# Patient Record
Sex: MALE | Race: WHITE | NOT HISPANIC OR LATINO | URBAN - METROPOLITAN AREA
[De-identification: names, ages, dates, MRNs, and addresses within clinical notes are randomized per-mention and may not be internally consistent; named-entity substitution may affect disease eponyms.]

---

## 2017-07-21 ENCOUNTER — OFFICE VISIT (OUTPATIENT)
Dept: FAMILY MEDICINE | Facility: CLINIC | Age: 27
End: 2017-07-21

## 2017-07-21 VITALS
OXYGEN SATURATION: 95 % | SYSTOLIC BLOOD PRESSURE: 116 MMHG | RESPIRATION RATE: 20 BRPM | HEART RATE: 74 BPM | WEIGHT: 125 LBS | BODY MASS INDEX: 16.04 KG/M2 | DIASTOLIC BLOOD PRESSURE: 80 MMHG | TEMPERATURE: 98.1 F | HEIGHT: 74 IN

## 2017-07-21 DIAGNOSIS — F64.0 GENDER DYSPHORIA IN ADOLESCENT AND ADULT: Primary | ICD-10-CM

## 2017-07-21 NOTE — PATIENT INSTRUCTIONS
Here is the plan from today's visit    1. Gender dysphoria in adolescent and adult    - Testosterone Total; Future  - Comprehensive Metabolic Panel; Future  - Hemoglobin (HGB); Future  - Lipid Cascade; Future      Please call or return to clinic if your symptoms don't go away.    Follow up plan  Please make a clinic appointment for follow up with me (BARB SIMON) in 1 month.  Follow up for lab appointment within next 3 weeks for blood draw (nothing to eat or drink for 10 hours prior to blood work).  Sips of water ok if needed.    Thank you for coming to [facility] today.  Lab Testing:  **If you had lab testing today and your results are reassuring or normal they will be mailed to you or sent through [employer] within 7 days.   **If the lab tests need quick action we will call you with the results.  The phone number we will call with results is # [phone] (home) . If this is not the best number please call our clinic and change the number.  Medication Refills:  If you need any refills please call your pharmacy and they will contact us.   If you need to  your refill at a new pharmacy, please contact the new pharmacy directly. The new pharmacy will help you get your medications transferred faster.   Scheduling:  If you have any concerns about today's visit or wish to schedule another appointment please call our office during normal business hours [phone] (8-5:00 M-F)  If a referral was made to a [facility] and you don't get a call from central scheduling please call [phone].  If a Mammogram was ordered for you at [facility] call [phone] to schedule or change your appointment.  If you had an XRay/CT/Ultrasound/MRI ordered the number is [phone] to schedule or change your radiology appointment.   Medical Concerns:  If you have urgent medical concerns please call [phone] at any time of the day.              Informed Consent   Feminizing  "Medications      This form refers to the use of estrogen and/or androgen antagonists (sometimes called \"anti-androgens\" or \"androgen blockers\") by persons in the male-to-female spectrum who wish to become feminized to reduce gender dysphoria and facilitate a more feminine gender presentation. While there are risks associated with taking feminizing medications, when appropriately prescribed they can greatly improve mental health and quality of life.     Please seek another opinion if you want additional perspective on any aspect of your care.       Feminizing Effects     1. I understand that estrogen, androgen antagonists, or a combination of the two may be  prescribed to reduce male physical features and feminize my body.     2. I understand that the feminizing effects of estrogen and androgen antagonists can take several  months to a year to become noticeable, speed and degree of change is unpredictable.     3.  I understand that if I am taking estrogen I will probably develop breasts, and:        ? Breasts may take several years to develop to their full size.   ? Even if estrogen is stopped, the breast tissue that has developed will remain.   ? As soon as breasts start growing, it is recommended to have an annual breast exam.  ? There may be milky nipple discharge (galactorrhea). If you develop this, it is advised to check with a doctor to determine the cause.   ? It is not known if taking estrogen increases the risk of breast cancer.     4. I understand that the following changes are generally not permanent (that is, they will likely reverse if I stop taking feminizing medications):     ? Skin may become softer.   ? Muscle mass decreases and there may be a decrease in upper body strength.   ? Body hair growth may become less noticeable and grow more slowly,but won't stop.  ? Male pattern baldness may slow down, but will probably not stop completely, and hair that has already been lost will likely not grow back. " "  ? Fat may redistribute to a more feminine pattern (decreased in abdomen, increased on buttocks/hips/thighs - changing from \"apple shape\" to \"pear shape\").       5. I understand that taking feminizing medications will make my testicles produce less testosterone, which can affect my overall sexual function:    ? Fertility may be impaired, and I should consider sperm banking if I desire biological children.  ? Sperm may not mature, leading to reduced fertility. It is still possible to get someone pregnant however and contraception should be used if needed.  ? Testicles may shrink by 25-50%. Testicular examinations are still recommended.  ? The amount of fluid ejaculated may be reduced.   ? There is typically decrease in morning and spontaneous erections.   ? Erections may not be firm enough for penetrative sex.   ? Libido (sex drive) may decrease.     6. I understand that there are some aspects of my body that are not significantly changed by feminizing medications, though there may be other treatments that can be helpful.    ? Blanton/facial hair may grow more slowly and be less noticeable, but will not go away.   ? Voice pitch will not rise and speech patterns will not become more feminine.   ? The laryngeal prominence (\"Adalberto's apple\") will not shrink.      Risks of Feminizing Medications     7. I understand that the medical effects and safety of feminizing medications are not fully understood, and that there may be long-term risks that are not yet known.     8. I understand that I am strongly advised not to take more medication than I am prescribed, as this increases health risks. It won't help changes come faster.     9. I understand that feminizing medications can damage the liver. I have been advised that I should be monitored for possible liver damage as long as I am taking feminizing medications.     10. I understand that feminizing medications will result in changes that will be noticeable by other people, and " that some people in similar circumstances have experienced harassment, discrimination, and violence, while others have lost support of loved ones. I have been advised that referrals can be made for support/counselling if this would be helpful.     Medical Risks Associated with Estrogen     11. I understand that taking estrogen increases the risk of blood clots, which can result in:     ? Pulmonary embolism (blood clot to the lungs), which may cause death.  Stroke, which may cause permanent brain damage or death   ? Heart attack   ? Chronic leg vein problems     I understand that the risk of blood clots is much worse if I smoke cigarettes, especially over age 40. I understand that the danger is so high that I should stop smoking completely if I start taking estrogen. I can ask my doctor for advice on quitting.    12. I understand that taking estrogen can increase deposits of fat around my internal organs, which is associated with increased risk for diabetes and heart disease.     13. I understand that taking estrogen can cause increased blood pressure,  estrogen increases the risk of gallstones,  estrogen can cause headaches or migraines and can cause nausea and vomiting. I have been advised that if I develop these, it is recommended that I discuss this with my doctor.     14. I understand that it is not known if taking estrogen increases the risk of non-cancerous tumors of the pituitary gland. I have been informed that although this is typically not life-threatening, it can damage vision. I understand that this will be monitored.    15. I have been informed that I am more likely to have dangerous side effects from estrogen if I smoke, am overweight, am over 40 years old, or have a history of blood clots, high blood pressure, or a family history of breast cancer.     16. I have been informed that if I take too much estrogen, my body may convert it into testosterone, which may slow or stop feminization.     Medical  Risks Associated with Androgen Antagonists     17. I have been informed that spironolactone affects the balance of water and salts in the kidneys, and that this may:     ? Increase the amount of urine produced, and I may urinate more frequently   ? Reduce blood pressure   ? Rarely, cause high levels of potassium in the blood, and this will be monitored    18. I understand that some androgen antagonists make it more difficult to evaluate the results of PSA test. If I am over 50, I should have my prostate evaluated every year.     Prevention of Medical Complications     19. I agree to take feminizing medications as prescribed and to tell my care provider if I am not happy with the treatment or am experiencing any problems.     20. I understand that the right dose or type of medication prescribed for me may not be the same as for someone else.     21. I understand that physical examinations and blood tests are needed on a regular basis (monthly, at first) to check for negative side effects of feminizing medications.     22. I understand that feminization medications can interact with other medication (including other sources of hormones), dietary supplements, herbs, alcohol, and street drugs. I understand that being honest with my care provider about what else I am taking will help prevent medical complications that could be life-threatening. I have been informed that I will continue to get medical care no matter what information I share.     23. I understand that some medical conditions make it dangerous to take estrogen or androgen antagonists. I agree to be checked for risky conditions before the decision to start or continue feminizing medication is made.     24. I understand that I can choose to stop taking feminizing medication at any time, and that it is advised that I do this with the help of my doctor to make sure there are no negative reactions to stopping. I understand that my doctor may suggest I reduce,  switch or stop taking feminizing medication, if there are severe health risks that can't be controlled.    My signature below confirms that:     ? My doctor has talked with me about the benefits and risks of feminizing medication, the possible or likely consequences of hormone therapy, and potential treatment options.   ? I understand the risks that may be involved.   ? I understand that this form covers known effects and risks and that there may be long-term effects or risks that are not yet known.   ? I have had sufficient opportunity to discuss treatment options with my doctor. All of my questions have been answered to my satisfaction.   ? I believe I have adequate knowledge on which to base informed consent to the provision of feminizing medication.     Based on this:     _____ I wish to begin taking estrogen.     _____ I wish to begin taking androgen antagonists (e.g., Spironolactone).     _____ I do not wish to begin taking feminizing medication at this time.     Whatever your current decision is, please talk with your doctor any time you have questions, concerns, or want to re-evaluate your options.         ____________________________________ __________________   Patient Signature     Date         ____________________________________ __________________   Prescribing Clinician Signature    Date    Some online resources for transgender health    [place] [facility]gender [facility]   Home to the [facility] at [address] [place] [address], [phone]. Also has support groups.  http://www.[place]transhealth.org/    [facility] Excellence for Transgender Health  Increasing access to comprehensive, effective, and affirming healthcare services for trans and gender-variant communities.  http://www.transhealth.[facility].edu/trans?page=ajith-00-05    [facility]   Community-based non-profit committed to advancing the health & wellness of LGBTQ communities through research, education and advocacy.  http://www.[facility].org    [facility] Glossary of Transgender Terms  http://www.[facility].org/site/DocServer/Handout_7-C_Glossary_of_Gender_and_Transgender_Terms__fi.pdf?ydxOT=4562    Safe, gender-neutral public restrooms in the [facility]   http://www.mntranshealth.org//index.php?option=com_content&task=view&id=12&Itemid    Trans Youth Support Network  For people 26 and under who identify as a trans or gender non-conforming person and want to be a part of an activist organization. Offers peer support, education and community building opportunities.   http://www.transyouthsupportnetwork.org/    Reclaim:  RECLAIM offers mental and integrative health services for LGBTQ youth and their families.  http://www.reclaim-lgbtyouth.org/    Gender Spectrum, for Trans Youth  Gender Spectrum provides education, training and support to help create a gender sensitive and inclusive environment for all children and teens. http://www.genderspectrum.org/    [facility] FTM Resource Guide  Information on topics of interest to female-to-male (FTM, F2M) trans men, and their friends and loved ones.  http://ftmguide.org/    Also check out your local [employer] queer resource center!  LGBTQIA Services at [facility]: http://www.[facility]/lgbtqia/  LGBTQ@[facility] at [facility]: http://www.[facility]/multiculturallife/lgbtq/  GLBTA Programs office at [facility] : https://diversity.[facility].edu/glbta/            Thoughts of self harm?   Trans Lifeline can be reached at [phone].   This service is staffed by trans people 24/7.   For LGBT youth (ages 24 and younger) contemplating suicide, the [employer] Lifeline can be reached at [phone].       FEMINIZING Medications, Labs and f/u for Transwomen (MTF)Updated 6/2016  Drug Dose s/p Orchiectomy Typical patient Miscellaneous Details   Estradiol po  (Estrace) Start: 2mg pills daily at same time  Typical: 4-6mg     Max: 8mg Typical 1-2mg daily <39 yo, nonsmoker  good w/ daily meds Erectile dysfunction  that is bothersome ? typical ED treatments.    Estradiol patch (Vivelle-Dot) apply 2x per wk  Start:  mcg/24 hr patch,   Typical: 100-200mcg daily  Max: 2 patches 2x/wk (200mcg/d) Typical: 37.5- 100mcg daily, changed 2x/wk Hx VTE, >40, smoker, transaminitis Patch falling off? Clean w/ EtOH first, apply & press x30sec. Baby oil to remove adhesive. Rx for 8 -16 patches/month.   Estradiol valerate (Delestrogen) Start: 5 mg IM weekly  Typical: 5-10 mg IM weekly  Max: 15-20 mg weekly 5mg IM weekly or 10 IM q2wk Ok w/ needles, >40, already on it Needles: 23-25gauge 1inch (if thigh) to 1.5inch (if glut), 1cc syringe, 18ga to draw up. Comes in 5ml bottles.   Estradiol cream  compounded Start: 50-100mcg to thighs daily  Typical: 100-200mcg   Max: 200 37.5-100mcg daily Ok w/ topical, paying out of pocket [facility]: [facility] [address], [facility] [address] online, in PLO gel   Conj. Estrogens (premarin)  Typical: 5mg daily. Avoid if possible due to higher risk VTE Get IMAN & ensure informed of risk Consult local expert before long term continuation.    Spironolactone Start: 50-100mg po daily  Max: 200 (MOST)-300mg  DC Bothersome hair, acne or erections AntiAndrogen. Check bmp when on max dose. Split dose if dizzy.   Finasteride Start: 2.5mg po daily  Max: 5-10mg daily DC unless for alopecia Bothersome hair, alopecia, or erections AntiAndrogen - ok to use in combo w/ casper if poor feminizing effect   ?Progesterone? RARELY USE 200mg prometrium HS or q3mo depo shots IF ASKED ONLY DC Wants breast dev, no depression/obesity Causes wt gain and a lot of depression. Data inconclusive on breast developmt.     LABS Hgb Fasting Glu Fasting Lipid Total testosterone LFT s Prolactin Estrogen Ultrasens.   On shots? Draw labs  Midcycle! ( - way btw shots) -Baseline  -3 mo after   -6mo after  -then annual  After=after start or dose change Or A1c  -Baseline  -3 mo after   -6mo after  -then annual -Baseline  -3 mo after   -6mo  after  -then annual -Baseline  -3 mo after   -6mo after  -q1yr.   Goal level <50 -Baseline  -3 mo after   -6mo after  -q1yr. Abnl?RUQ US, hep serology -if Sx only, rare.  If >100 needs f/u, MRI Check ONLY if on injectable or not clinically feminizing. Level should be 100-200.  >200 ?VTE risk and dose must be ?immediately, f/u in 1 month   On casper -Baseline BUN/Cr/lytes, Again when MAX dose  -annually thereafter, careful with ACEi s Follow up lab abnormalities w/repeat labs  UDon t panic ?may need small adjustment in dose.      -PPsychotherapy: not required! REC if: no consolidated gender, not gender dysphoric by criteria, nonconforming & may not need hormones  -E-Estrogen therapy is CONTRAINDICATED IF estrogen dependent cancer  -G-Gender related effects: Breast development, redistribution of body fat, softening of skin, ?mood, testes shrink, ?libido and ? fertility  -O-Other effects: ?VTE risk (steven if >40, smoker, obese, hx clots, po meds), ?Weight, emotional lability, ?lipids, ?erections, ?gallstones  **Upcoming surgery? HOLD ESTROGEN for 2-4 weeks prior to surgical procedure Adapted by Nasra Johns MD from:  Dept Public Health, [facility] Trans* Center of Excellence, [facility] Guidelines, [facility]PATH SOC-7.  Updated 6/2016  Trans Guidelines [facility]       -    -0Psychotherapy: not required! REC if: no consolidated gender, not gender dysphoric by criteria, nonconforming & may not need hormones      -Testosterone therapy is CONTRAINDICATED IF unstable psychosis, bipolar, or PREGNANCY. Need excellent contraception if sperm!  -   Gender related effects: stop menses, ?voice, ?facial/body hair, ?muscle mass, redistrib body fat, acne, ?libido, ?fertility, ?clitoris  -   Other effects: ?Weight, emotional lability, ?lipids, vaginal atrophy, ? BP, ?RBC, ?LFT s transiently, ?LAMBERT, ?endometrial hyperplasia steven  since higher incidence of PCOS in transmen    -surveillance of periods is critical for long term T  -    Testosterone is a controlled substance. Educate about this. Follow for reasonable refills. Paper rx required.    Adapted by Nasra Johns MD from:  Dept Public Health, [facility] Trans* Center of Excellence, [facility] Guidelines, WPATH SOC-7. Updated 6/2016 Trans Guidelines [facility]     Effects and Expected Time Course of Feminizing Hormones    Effect Expected Onset Expected Maximum Effect   Body Fat redistribution 3-6 months 2-5 years   Decreased Muscle mass 3-6 months 1-2 years   Softening of skin/decreased oiliness 3-6 months Unknown   Decreased Libido 1-3 months 1-2 years   Decreased Spontaneous Erections 1-3 months 3-6 months   Male Sexual Dysfunction Variable Variable   Breast Growth 3-6 months 2-3 years   Decreased Testicular volume 3-6 months 2-3 years   Decreased sperm production Variable Variable   Thinning and slowed growth of body and facial hair  + 6-12 months >3 years   Male pattern baldness No regrowth, loss stops 1-3 months 1-2 years   +complete removal of male facial hair and body hair requires electrolysis, laser treatment or both

## 2017-07-21 NOTE — MR AVS SNAPSHOT
After Visit Summary   7/21/2017    Carlos Yu    MRN: [id number]           Patient Information     Date Of Birth          1990        Visit Information        Provider Department      7/21/2017 1:20 PM Won Simon MD [facility]        Today's Diagnoses     Gender dysphoria in adolescent and adult    -  1      Care Instructions    Here is the plan from today's visit    1. Gender dysphoria in adolescent and adult    - Testosterone Total; Future  - Comprehensive Metabolic Panel; Future  - Hemoglobin (HGB); Future  - Lipid Cascade; Future      Please call or return to clinic if your symptoms don't go away.    Follow up plan  Please make a clinic appointment for follow up with me (WON SIMON) in 1 month.  Follow up for lab appointment within next 3 weeks for blood draw (nothing to eat or drink for 10 hours prior to blood work).  Sips of water ok if needed.    Thank you for coming to [facility] today.  Lab Testing:  **If you had lab testing today and your results are reassuring or normal they will be mailed to you or sent through [employer] within 7 days.   **If the lab tests need quick action we will call you with the results.  The phone number we will call with results is # [phone] (home) . If this is not the best number please call our clinic and change the number.  Medication Refills:  If you need any refills please call your pharmacy and they will contact us.   If you need to  your refill at a new pharmacy, please contact the new pharmacy directly. The new pharmacy will help you get your medications transferred faster.   Scheduling:  If you have any concerns about today's visit or wish to schedule another appointment please call our office during normal business hours [phone] (8-5:00 M-F)  If a referral was made to a [facility] and you don't get a call from central scheduling please call [phone].  If  "a Mammogram was ordered for you at [facility] call [phone] to schedule or change your appointment.  If you had an XRay/CT/Ultrasound/MRI ordered the number is [phone] to schedule or change your radiology appointment.   Medical Concerns:  If you have urgent medical concerns please call [phone] at any time of the day.              Informed Consent   Feminizing Medications      This form refers to the use of estrogen and/or androgen antagonists (sometimes called \"anti-androgens\" or \"androgen blockers\") by persons in the male-to-female spectrum who wish to become feminized to reduce gender dysphoria and facilitate a more feminine gender presentation. While there are risks associated with taking feminizing medications, when appropriately prescribed they can greatly improve mental health and quality of life.     Please seek another opinion if you want additional perspective on any aspect of your care.       Feminizing Effects     1. I understand that estrogen, androgen antagonists, or a combination of the two may be  prescribed to reduce male physical features and feminize my body.     2. I understand that the feminizing effects of estrogen and androgen antagonists can take several  months to a year to become noticeable, speed and degree of change is unpredictable.     3.  I understand that if I am taking estrogen I will probably develop breasts, and:        ? Breasts may take several years to develop to their full size.   ? Even if estrogen is stopped, the breast tissue that has developed will remain.   ? As soon as breasts start growing, it is recommended to have an annual breast exam.  ? There may be milky nipple discharge (galactorrhea). If you develop this, it is advised to check with a doctor to determine the cause.   ? It is not known if taking estrogen increases the risk of breast cancer.     4. I understand that the following changes are generally not permanent (that is, they will likely " "reverse if I stop taking feminizing medications):     ? Skin may become softer.   ? Muscle mass decreases and there may be a decrease in upper body strength.   ? Body hair growth may become less noticeable and grow more slowly,but won't stop.  ? Male pattern baldness may slow down, but will probably not stop completely, and hair that has already been lost will likely not grow back.   ? Fat may redistribute to a more feminine pattern (decreased in abdomen, increased on buttocks/hips/thighs - changing from \"apple shape\" to \"pear shape\").       5. I understand that taking feminizing medications will make my testicles produce less testosterone, which can affect my overall sexual function:    ? Fertility may be impaired, and I should consider sperm banking if I desire biological children.  ? Sperm may not mature, leading to reduced fertility. It is still possible to get someone pregnant however and contraception should be used if needed.  ? Testicles may shrink by 25-50%. Testicular examinations are still recommended.  ? The amount of fluid ejaculated may be reduced.   ? There is typically decrease in morning and spontaneous erections.   ? Erections may not be firm enough for penetrative sex.   ? Libido (sex drive) may decrease.     6. I understand that there are some aspects of my body that are not significantly changed by feminizing medications, though there may be other treatments that can be helpful.    ? Blanton/facial hair may grow more slowly and be less noticeable, but will not go away.   ? Voice pitch will not rise and speech patterns will not become more feminine.   ? The laryngeal prominence (\"Adalberto's apple\") will not shrink.      Risks of Feminizing Medications     7. I understand that the medical effects and safety of feminizing medications are not fully understood, and that there may be long-term risks that are not yet known.     8. I understand that I am strongly advised not to take more medication than I am " prescribed, as this increases health risks. It won't help changes come faster.     9. I understand that feminizing medications can damage the liver. I have been advised that I should be monitored for possible liver damage as long as I am taking feminizing medications.     10. I understand that feminizing medications will result in changes that will be noticeable by other people, and that some people in similar circumstances have experienced harassment, discrimination, and violence, while others have lost support of loved ones. I have been advised that referrals can be made for support/counselling if this would be helpful.     Medical Risks Associated with Estrogen     11. I understand that taking estrogen increases the risk of blood clots, which can result in:     ? Pulmonary embolism (blood clot to the lungs), which may cause death.  Stroke, which may cause permanent brain damage or death   ? Heart attack   ? Chronic leg vein problems     I understand that the risk of blood clots is much worse if I smoke cigarettes, especially over age 40. I understand that the danger is so high that I should stop smoking completely if I start taking estrogen. I can ask my doctor for advice on quitting.    12. I understand that taking estrogen can increase deposits of fat around my internal organs, which is associated with increased risk for diabetes and heart disease.     13. I understand that taking estrogen can cause increased blood pressure,  estrogen increases the risk of gallstones,  estrogen can cause headaches or migraines and can cause nausea and vomiting. I have been advised that if I develop these, it is recommended that I discuss this with my doctor.     14. I understand that it is not known if taking estrogen increases the risk of non-cancerous tumors of the pituitary gland. I have been informed that although this is typically not life-threatening, it can damage vision. I understand that this will be monitored.    15.  I have been informed that I am more likely to have dangerous side effects from estrogen if I smoke, am overweight, am over 40 years old, or have a history of blood clots, high blood pressure, or a family history of breast cancer.     16. I have been informed that if I take too much estrogen, my body may convert it into testosterone, which may slow or stop feminization.     Medical Risks Associated with Androgen Antagonists     17. I have been informed that spironolactone affects the balance of water and salts in the kidneys, and that this may:     ? Increase the amount of urine produced, and I may urinate more frequently   ? Reduce blood pressure   ? Rarely, cause high levels of potassium in the blood, and this will be monitored    18. I understand that some androgen antagonists make it more difficult to evaluate the results of PSA test. If I am over 50, I should have my prostate evaluated every year.     Prevention of Medical Complications     19. I agree to take feminizing medications as prescribed and to tell my care provider if I am not happy with the treatment or am experiencing any problems.     20. I understand that the right dose or type of medication prescribed for me may not be the same as for someone else.     21. I understand that physical examinations and blood tests are needed on a regular basis (monthly, at first) to check for negative side effects of feminizing medications.     22. I understand that feminization medications can interact with other medication (including other sources of hormones), dietary supplements, herbs, alcohol, and street drugs. I understand that being honest with my care provider about what else I am taking will help prevent medical complications that could be life-threatening. I have been informed that I will continue to get medical care no matter what information I share.     23. I understand that some medical conditions make it dangerous to take estrogen or androgen  antagonists. I agree to be checked for risky conditions before the decision to start or continue feminizing medication is made.     24. I understand that I can choose to stop taking feminizing medication at any time, and that it is advised that I do this with the help of my doctor to make sure there are no negative reactions to stopping. I understand that my doctor may suggest I reduce, switch or stop taking feminizing medication, if there are severe health risks that can't be controlled.    My signature below confirms that:     ? My doctor has talked with me about the benefits and risks of feminizing medication, the possible or likely consequences of hormone therapy, and potential treatment options.   ? I understand the risks that may be involved.   ? I understand that this form covers known effects and risks and that there may be long-term effects or risks that are not yet known.   ? I have had sufficient opportunity to discuss treatment options with my doctor. All of my questions have been answered to my satisfaction.   ? I believe I have adequate knowledge on which to base informed consent to the provision of feminizing medication.     Based on this:     _____ I wish to begin taking estrogen.     _____ I wish to begin taking androgen antagonists (e.g., Spironolactone).     _____ I do not wish to begin taking feminizing medication at this time.     Whatever your current decision is, please talk with your doctor any time you have questions, concerns, or want to re-evaluate your options.         ____________________________________ __________________   Patient Signature     Date         ____________________________________ __________________   Prescribing Clinician Signature    Date    Some online resources for transgender health    [place] Transgender Health Coalition   Home to the [facility] at [address] [place] [address], [phone]. Also has support groups.  http://www.[place]transhealth.org/    Center of  Excellence for Transgender Health  Increasing access to comprehensive, effective, and affirming healthcare services for trans and gender-variant communities.  http://www.transhealth.[facility].edu/trans?page=ajith-00-05    [facility]   Community-based non-profit committed to advancing the health & wellness of LGBTQ communities through research, education and advocacy. http://www.[employer].org    [facility] Glossary of Transgender Terms  http://www.[facility].org/site/DocServer/Handout_7-C_Glossary_of_Gender_and_Transgender_Terms__fi.pdf?nmiUB=9852    Safe, gender-neutral public restrooms in [facility]   http://www.[place]transhealth.org//index.php?option=com_content&task=view&id=12&Itemid    Trans Youth Support Network  For people 26 and under who identify as a trans or gender non-conforming person and want to be a part of an activist organization. Offers peer support, education and community building opportunities.   http://www.transyouthsupportnetwork.org/    Reclaim:  RECLAIM offers mental and integrative health services for LGBTQ youth and their families.  http://www.reclaim-lgbtyouth.org/    Gender Spectrum, for Trans Youth  Gender Spectrum provides education, training and support to help create a gender sensitive and inclusive environment for all children and teens. http://www.genderspectrum.org/    [place] s FTM Resource Guide  Information on topics of interest to female-to-male (FTM, F2M) trans men, and their friends and loved ones.  http://ftmguide.org/    Also check out your local [employer] queer resource center!  LGBTQIA Services at [facility]: http://www.[facility]/lgbtqia/  LGBTQ@[facility] at [facility]: http://www.[facility].edu/multiculturallife/lgbtq/  GLBTA Programs office at  of : https://diversity.[facility].edu/glbta/            Thoughts of self harm?   Trans Lifeline can be reached at [phone].   This service is staffed by trans people 24/7.   For LGBT youth (ages 24 and younger) contemplating  suicide, the [employer] Lifeline can be reached at [phone].       FEMINIZING Medications, Labs and f/u for Transwomen (MTF)Updated 6/2016  Drug Dose s/p Orchiectomy Typical patient Miscellaneous Details   Estradiol po  (Estrace) Start: 2mg pills daily at same time  Typical: 4-6mg     Max: 8mg Typical 1-2mg daily <41 yo, nonsmoker  good w/ daily meds Erectile dysfunction that is bothersome ? typical ED treatments.    Estradiol patch (Vivelle-Dot) apply 2x per wk  Start:  mcg/24 hr patch,   Typical: 100-200mcg daily  Max: 2 patches 2x/wk (200mcg/d) Typical: 37.5- 100mcg daily, changed 2x/wk Hx VTE, >40, smoker, transaminitis Patch falling off? Clean w/ EtOH first, apply & press x30sec. Baby oil to remove adhesive. Rx for 8 -16 patches/month.   Estradiol valerate (Delestrogen) Start: 5 mg IM weekly  Typical: 5-10 mg IM weekly  Max: 15-20 mg weekly 5mg IM weekly or 10 IM q2wk Ok w/ needles, >40, already on it Needles: 23-25gauge 1inch (if thigh) to 1.5inch (if glut), 1cc syringe, 18ga to draw up. Comes in 5ml bottles.   Estradiol cream  compounded Start: 50-100mcg to thighs daily  Typical: 100-200mcg   Max: 200 37.5-100mcg daily Ok w/ topical, paying out of pocket Pharm: [facility] [address] online, in PLO gel   Conj. Estrogens (premarin)  Typical: 5mg daily. Avoid if possible due to higher risk VTE Get IMAN & ensure informed of risk Consult local expert before long term continuation.    Spironolactone Start: 50-100mg po daily  Max: 200 (MOST)-300mg  DC Bothersome hair, acne or erections AntiAndrogen. Check bmp when on max dose. Split dose if dizzy.   Finasteride Start: 2.5mg po daily  Max: 5-10mg daily DC unless for alopecia Bothersome hair, alopecia, or erections AntiAndrogen - ok to use in combo w/ casper if poor feminizing effect   ?Progesterone? RARELY USE 200mg prometrium HS or q3mo depo shots IF ASKED ONLY DC Wants breast dev, no depression/obesity Causes wt gain and a lot  of depression. Data inconclusive on breast developmt.     LABS Hgb Fasting Glu Fasting Lipid Total testosterone LFT s Prolactin Estrogen Ultrasens.   On shots? Draw labs  Midcycle! ( - way btw shots) -Baseline  -3 mo after   -6mo after  -then annual  After=after start or dose change Or A1c  -Baseline  -3 mo after   -6mo after  -then annual -Baseline  -3 mo after   -6mo after  -then annual -Baseline  -3 mo after   -6mo after  -q1yr.   Goal level <50 -Baseline  -3 mo after   -6mo after  -q1yr. Abnl?RUQ US, hep serology -if Sx only, rare.  If >100 needs f/u, MRI Check ONLY if on injectable or not clinically feminizing. Level should be 100-200.  >200 ?VTE risk and dose must be ?immediately, f/u in 1 month   On casper -Baseline BUN/Cr/lytes, Again when MAX dose  -annually thereafter, careful with ACEi s Follow up lab abnormalities w/repeat labs  UDon t panic ?may need small adjustment in dose.      -PPsychotherapy: not required! REC if: no consolidated gender, not gender dysphoric by criteria, nonconforming & may not need hormones  -E-Estrogen therapy is CONTRAINDICATED IF estrogen dependent cancer  -G-Gender related effects: Breast development, redistribution of body fat, softening of skin, ?mood, testes shrink, ?libido and ? fertility  -O-Other effects: ?VTE risk (steven if >40, smoker, obese, hx clots, po meds), ?Weight, emotional lability, ?lipids, ?erections, ?gallstones  **Upcoming surgery? HOLD ESTROGEN for 2-4 weeks prior to surgical procedure Adapted by Nasra Johns MD from: [place] Dept Public Health, [facility] Trans* Center of Excellence, [facility] Guidelines, WPATH SOC-7.  Updated 6/2016  Trans Guidelines [facility]       -    -0Psychotherapy: not required! REC if: no consolidated gender, not gender dysphoric by criteria, nonconforming & may not need hormones      -Testosterone therapy is CONTRAINDICATED IF unstable psychosis, bipolar, or PREGNANCY. Need excellent contraception if sperm!  -   Gender related  effects: stop menses, ?voice, ?facial/body hair, ?muscle mass, redistrib body fat, acne, ?libido, ?fertility, ?clitoris  -   Other effects: ?Weight, emotional lability, ?lipids, vaginal atrophy, ? BP, ?RBC, ?LFT s transiently, ?LAMBERT, ?endometrial hyperplasia steven  since higher incidence of PCOS in transmen    -surveillance of periods is critical for long term T  -   Testosterone is a controlled substance. Educate about this. Follow for reasonable refills. Paper rx required.    Adapted by Nasra Johns MD from:  Dept Public Health, [facility] Trans* Center of Excellence, [facility] Guidelines, WPATH SOC-7. Updated 6/2016 Trans Guidelines [facility]     Effects and Expected Time Course of Feminizing Hormones    Effect Expected Onset Expected Maximum Effect   Body Fat redistribution 3-6 months 2-5 years   Decreased Muscle mass 3-6 months 1-2 years   Softening of skin/decreased oiliness 3-6 months Unknown   Decreased Libido 1-3 months 1-2 years   Decreased Spontaneous Erections 1-3 months 3-6 months   Male Sexual Dysfunction Variable Variable   Breast Growth 3-6 months 2-3 years   Decreased Testicular volume 3-6 months 2-3 years   Decreased sperm production Variable Variable   Thinning and slowed growth of body and facial hair  + 6-12 months >3 years   Male pattern baldness No regrowth, loss stops 1-3 months 1-2 years   +complete removal of male facial hair and body hair requires electrolysis, laser treatment or both                    Follow-ups after your visit        Follow-up notes from your care team     Return in about 4 weeks (around 8/18/2017).      Future tests that were ordered for you today     Open Future Orders        Priority Expected Expires Ordered    Testosterone Total Routine  7/21/2018 7/21/2017    Comprehensive Metabolic Panel Routine  7/21/2018 7/21/2017    Hemoglobin (HGB) Routine  7/21/2018 7/21/2017    Lipid Shasta Routine  7/21/2018 7/21/2017            Who to contact     Please call your  "clinic at [phone] to:    Ask questions about your health    Make or cancel appointments    Discuss your medicines    Learn about your test results    Speak to your doctor   If you have compliments or concerns about an experience at your clinic, or if you wish to file a complaint, please contact [facility] Patient Relations at [phone] or email us at [email or web address]         Additional Information About Your Visit        [employer] Information     WorkCast is an electronic gateway that provides easy, online access to your medical records. With WorkCast, you can request a clinic appointment, read your test results, renew a prescription or communicate with your care team.     To sign up for WorkCast visit the website at [email or web address]   You will be asked to enter the access code listed below, as well as some personal information. Please follow the directions to create your username and password.     Your access code is: [email or web address]  Expires: 10/19/2017  1:53 PM     Your access code will  in 90 days. If you need help or a new code, please contact your [facility] or call [phone] for assistance.        Care EveryWhere ID     This is your Care EveryWhere ID. This could be used by other organizations to access your [place] medical records  [id number]        Your Vitals Were     Pulse Temperature Respirations Height Pulse Oximetry BMI (Body Mass Index)    74 98.1  F (36.7  C) (Oral) 20 6' 2\" (188 cm) 95% 16.05 kg/m2       Blood Pressure from Last 3 Encounters:   17 116/80    Weight from Last 3 Encounters:   17 125 lb (56.7 kg)               Primary Care Provider    Provider Unknown       No address on file        Equal Access to Services     ALIA SHEARER : Flower Mckenzie, dave pearson, papa land. So [facility] [phone].    ATENCIÓN: Si " angelo howe, tiene a delgado disposición servicios gratuitos de asistencia lingüística. Rosie melgar [phone].    We comply with applicable federal civil rights laws and [place] laws. We do not discriminate on the basis of race, color, national origin, age, disability sex, sexual orientation or gender identity.            Thank you!     Thank you for choosing [facility]  for your care. Our goal is always to provide you with excellent care. Hearing back from our patients is one way we can continue to improve our services. Please take a few minutes to complete the written survey that you may receive in the mail after your visit with us. Thank you!             Your Updated Medication List - Protect others around you: Learn how to safely use, store and throw away your medicines at www.disposemymeds.org.      Notice  As of 7/21/2017  1:53 PM    You have not been prescribed any medications.

## 2017-07-21 NOTE — PROGRESS NOTES
"  Transgender History Intake:       LENARD Gonzalez is a 27 year old individual  who presents today for interest in feminizing hormone therapy to better align their body with their gender identity.  Came to this clinic via referral from: Online, friends suggestion    1-How do you identify? BOLD all that apply     Male   Female Transgender  FTM  MTF  Intersex    Genderqueer Gender non-conforming Bigender Don't know Other:     2. What pronouns do you prefer?  She/Her/Hers/Herself   3-What age did you first feel your gender identity (internal sense of gender) did not match your physical body?      19 years old   4-Have you ever felt depressed or suicidal because your gender identity and body don't match?      YES Some mild depression/anxiety, no suicidality    5-Have you legally changed your name? no   If yes: prior name for IMAN:   Gender marker on ID's?   Male curerntly  6-Have you ever seen a health care provider about being transgender?No  When were you first treated and where? N/A  7-What hormones have you been on and for how long? (These can be treatments you were prescribed, that you got from a friend or bought without a prescription. Include any treatments you currently take.) N/A  8-Ever had any problems with hormone treatment?  N/A  9-If not on hormones, would you like to be?   yes  What are your goals for hormone therapy ? \"When I look in the mirrior, I would like to see myself as a woman\" \"I would like other people to see me as a woman\"  10-Have you had any gender affirmation surgery? No  11-Do you want to have surgery now or in future? Given some thoughts, but unsure at this time  12-Are you out at work or at school or at home?      Some people know. Some friends and siblings know, but otherwise, others do not  13-What are your other questions or concerns today? How long does it take for things to change with hormones, what to expect?  14-What else we should know about you?  Nothing " specifically    ----------    History reviewed. No pertinent surgical history.    There is no problem list on file for this patient.    Past Medical History:   Diagnosis Date     Uncomplicated asthma        No current outpatient prescriptions on file.       Family History   Problem Relation Age of Onset     Breast Cancer Maternal Grandmother      Other Cancer Maternal Grandmother        No Known Allergies    History   Smoking Status     Never Smoker   Smokeless Tobacco     Never Used       Mental Health Assessment:  Non gender related Therapist? No  Chemical use history No  Mental Health diagnosis  history none  Medications prescribed for above and by whom? N/A              Review of Systems:   CONSTITUTIONAL: NEGATIVE for fever, chills, change in weight  INTEGUMENTARY/SKIN: NEGATIVE for worrisome rashes, moles or lesions  EYES: NEGATIVE for vision changes or irritation  ENT/MOUTH: NEGATIVE for ear, mouth and throat problems  RESP: NEGATIVE for significant cough or SOB  BREAST: NEGATIVE for masses, tenderness or discharge  CV: NEGATIVE for chest pain, palpitations or peripheral edema  GI: NEGATIVE for nausea, abdominal pain, heartburn, or change in bowel habits  : NEGATIVE for frequency, dysuria, or hematuria  MUSCULOSKELETAL:POSITIVE for occasional intermittent chronic bilateral knee pain  NEURO: NEGATIVE for weakness, dizziness or paresthesias  ENDOCRINE: NEGATIVE for temperature intolerance, skin/hair changes  HEME/ALLERGY: NEGATIVE for bleeding problems  PSYCHIATRIC: NEGATIVE for changes in mood or affect           Social History     Social History     Social History     Marital status: Single     Spouse name: N/A     Number of children: N/A     Years of education: N/A     Social History Main Topics     Smoking status: Never Smoker     Smokeless tobacco: Never Used     Alcohol use No     Drug use: No     Sexual activity: No     Other Topics Concern     None     Social History Narrative     None       Marital  "Status: Single  Who lives in your household? 1 roommate, but moving out in 1 week, will be living alone    Has anyone hurt you physically, for example by pushing, hitting, slapping or kicking you or forcing you to have sex? Denies  Do you feel threatened or controlled by a partner, ex-partner or anyone in your life? Denies    Sexual Health     Sexual concerns:  No   History of sexual abuse:  No  STI History:   Neg  Pregnancy History:  N/A  Last Pap Smear :  N/A  Abnormal Pap Hx:  N/A    Sexual health and relationships:  Current sexual partners: None  , attracted toTrans and cis men and women   Past sexual partners:    None           Physical Exam:     Vitals:    07/21/17 1319   BP: 116/80   BP Location: Right arm   Patient Position: Chair   Cuff Size: Adult Regular   Pulse: 74   Resp: 20   Temp: 98.1  F (36.7  C)   TempSrc: Oral   SpO2: 95%   Weight: 125 lb (56.7 kg)   Height: 6' 2\" (188 cm)     BMI= Body mass index is 16.05 kg/(m^2).   Appearance: male appearance and dress  GENERAL:: healthy, alert and no distress  EYES: Eyes grossly normal to inspection, fundi benign and PERRL  HENT: ear canals normal, TM's normal, Nose normal, Mouth- no ulcers, no lesions  NECK: no adenopathy, no asymmetry, no masses,  and thyroid normal to palpation, supple  RESP: lungs clear to auscultation - no rales, no rhonchi, no wheezes  CV: regular rates and rhythm, normal S1 S2, no S3 or S4 and no murmur, no click or rub -  ABDOMEN: soft, no tenderness, no  hepatosplenomegaly, no masses, normal bowel sounds  MS: extremities normal- no gross deformities noted, no edema  SKIN: no suspicious lesions, no rashes  NEURO: Normal strength and tone, sensory exam grossly normal, mentation intact and speech normal, reflexes symmetric  Affect: Appropriate/mood-congruent      Assessment and Plan   There are no diagnoses linked to this encounter.     Today s visit included assessment of interventions to alleviate symptoms related to gender dysphoria " or gender nonconformity, including     psychological support    medical treatment (hormones or blockers)    options for social support or changes in gender expression.   Hormones can be provided in 3-6 months IF:     Patient able to provide informed consent     Likely to take hormones in a responsible manner    Discussed physical effects, benefits, and risk assessment & modification    Discussed the clinical and biochemical monitoring of hormonal changes and the potential impact on reproductive health (see smiavsconsent)    Stable mental health. Transgender patients are at higher risk of suicide. This patient has been assessed for suicide risk.  Oriented to overall gender assessment and hormone start process, may be 3-6 months before hormones start, need for [id number] visits then q3mo, then q6mo    (This assessment is based on the 2011 published Standards of Care for the Health of Transsexual, Transgender, and Gender-Nonconforming People, Version 7, by the [employer] of Transgender Health. WPATH SOC Guidelines)  Patient meets the following diagnostic criteria for gender dysphoria:  A strong desire for the 1  and/or 2  sex characteristics of the other gender  A strong desire to be of the other gender (or some alternative gender different from one s assigned gender)  A strong desire to be treated as the other gender (or some alternative gender)  A strong conviction that one has the typical feelings and reactions of the other gender (or some alternative gender)}  Only atypical finding was that gender dysphoria symptoms did not begin until age 19 or 20.  Will consider psychological evaluation at next visit.    .   Follow up:  Follow up in 1 month.  Will get baseline labs prior to next visit.      Won Ledesma MD    PGY-2 Resident  [facility]  [phone]

## 2017-07-21 NOTE — PROGRESS NOTES
Preceptor Attestation:   Patient seen and discussed with the resident. Assessment and plan reviewed with resident and agreed upon.   Supervising Physician:  Shelia Sanchez MD  [facility]

## 2017-07-29 DIAGNOSIS — F64.0 GENDER DYSPHORIA IN ADOLESCENT AND ADULT: ICD-10-CM

## 2017-07-29 LAB — HGB BLD-MCNC: 17 G/DL (ref 13.3–17.7)

## 2017-07-29 PROCEDURE — 36415 COLL VENOUS BLD VENIPUNCTURE: CPT | Performed by: FAMILY MEDICINE

## 2017-07-29 PROCEDURE — 85018 HEMOGLOBIN: CPT | Performed by: FAMILY MEDICINE

## 2017-07-29 PROCEDURE — 80053 COMPREHEN METABOLIC PANEL: CPT | Performed by: FAMILY MEDICINE

## 2017-07-29 PROCEDURE — 80061 LIPID PANEL: CPT | Performed by: FAMILY MEDICINE

## 2017-07-29 PROCEDURE — 84403 ASSAY OF TOTAL TESTOSTERONE: CPT | Performed by: FAMILY MEDICINE

## 2017-07-30 LAB
ALBUMIN SERPL-MCNC: 4.6 G/DL (ref 3.4–5)
ALP SERPL-CCNC: 66 U/L (ref 40–150)
ALT SERPL W P-5'-P-CCNC: 23 U/L (ref 0–70)
ANION GAP SERPL CALCULATED.3IONS-SCNC: 6 MMOL/L (ref 3–14)
AST SERPL W P-5'-P-CCNC: 16 U/L (ref 0–45)
BILIRUB SERPL-MCNC: 1.1 MG/DL (ref 0.2–1.3)
BUN SERPL-MCNC: 12 MG/DL (ref 7–30)
CALCIUM SERPL-MCNC: 9.2 MG/DL (ref 8.5–10.1)
CHLORIDE SERPL-SCNC: 105 MMOL/L (ref 94–109)
CHOLEST SERPL-MCNC: 136 MG/DL
CO2 SERPL-SCNC: 29 MMOL/L (ref 20–32)
CREAT SERPL-MCNC: 0.74 MG/DL (ref 0.66–1.25)
GFR SERPL CREATININE-BSD FRML MDRD: NORMAL ML/MIN/1.7M2
GLUCOSE SERPL-MCNC: 83 MG/DL (ref 70–99)
HDLC SERPL-MCNC: 51 MG/DL
LDLC SERPL CALC-MCNC: 76 MG/DL
NONHDLC SERPL-MCNC: 85 MG/DL
POTASSIUM SERPL-SCNC: 3.9 MMOL/L (ref 3.4–5.3)
PROT SERPL-MCNC: 7.7 G/DL (ref 6.8–8.8)
SODIUM SERPL-SCNC: 140 MMOL/L (ref 133–144)
TRIGL SERPL-MCNC: 45 MG/DL

## 2017-08-01 LAB — TESTOST SERPL-MCNC: 515 NG/DL (ref 240–950)

## 2017-08-16 ENCOUNTER — OFFICE VISIT (OUTPATIENT)
Dept: FAMILY MEDICINE | Facility: CLINIC | Age: 27
End: 2017-08-16

## 2017-08-16 VITALS
WEIGHT: 126.4 LBS | HEART RATE: 86 BPM | BODY MASS INDEX: 16.22 KG/M2 | DIASTOLIC BLOOD PRESSURE: 84 MMHG | SYSTOLIC BLOOD PRESSURE: 136 MMHG | HEIGHT: 74 IN | RESPIRATION RATE: 16 BRPM | TEMPERATURE: 98.3 F | OXYGEN SATURATION: 97 %

## 2017-08-16 DIAGNOSIS — F64.0 GENDER DYSPHORIA IN ADOLESCENT AND ADULT: Primary | ICD-10-CM

## 2017-08-16 PROBLEM — J45.20 MILD INTERMITTENT ASTHMA: Chronic | Status: ACTIVE | Noted: 2017-08-16

## 2017-08-16 NOTE — MR AVS SNAPSHOT
After Visit Summary   8/16/2017    Carlos Yu    MRN: [id number]           Patient Information     Date Of Birth          1990        Visit Information        Provider Department      8/16/2017 4:00 PM Won Simon MD [facility]        Today's Diagnoses     Gender dysphoria in adolescent and adult    -  1      Care Instructions      Here is the plan from today's visit    1. Gender dysphoria in adolescent and adult  Think about plan for estrogens, anti-androgen medications, come with questions and ideal plan for hormone replacement therapy.  Likely to start hormones in 2-4 weeks at next visit      Please call or return to clinic if your symptoms don't go away.    Follow up plan  Please make a clinic appointment for follow up with me (WON SIMON) in 2-4  weeks.    Thank you for coming to [facility] today.  Lab Testing:  **If you had lab testing today and your results are reassuring or normal they will be mailed to you or sent through [employer] within 7 days.   **If the lab tests need quick action we will call you with the results.  The phone number we will call with results is # [phone] (home) . If this is not the best number please call our clinic and change the number.  Medication Refills:  If you need any refills please call your pharmacy and they will contact us.   If you need to  your refill at a new pharmacy, please contact the new pharmacy directly. The new pharmacy will help you get your medications transferred faster.   Scheduling:  If you have any concerns about today's visit or wish to schedule another appointment please call our office during normal business hours [phone] (8-5:00 M-F)  If a referral was made to a [facility] and you don't get a call from central scheduling please call [phone].  If a Mammogram was ordered for you at [facility] call [phone] to schedule or change  your appointment.  If you had an XRay/CT/Ultrasound/MRI ordered the number is [phone] to schedule or change your radiology appointment.   Medical Concerns:  If you have urgent medical concerns please call [phone] at any time of the day.      Some online resources for transgender health    [place] Transgender Health Coalition   Home to the [facility] at [address], [phone]. Also has support groups.  http://www.[place]transhealth.org/    Center of Excellence for Transgender Health  Increasing access to comprehensive, effective, and affirming healthcare services for trans and gender-variant communities.  http://www.transhealth.[facility].edu/trans?page=ajith-00-05    [facility]   Community-based non-profit committed to advancing the health & wellness of LGBTQ communities through research, education and advocacy. http://www.[facility]org    [employer] Glossary of Transgender Terms  http://www.[employer].org/site/DocServer/Handout_7-C_Glossary_of_Gender_and_Transgender_Terms__fi.pdf?xulZR=0777    Safe, gender-neutral public restrooms in the [facility]   http://www.[facility].org//index.php?option=com_content&task=view&id=12&Itemid    Trans Youth Support Network  For people 26 and under who identify as a trans or gender non-conforming person and want to be a part of an activist organization. Offers peer support, education and community building opportunities.   http://www.transyouthsupportnetwork.org/    Reclaim:  RECLAIM offers mental and integrative health services for LGBTQ youth and their families.  http://www.reclaim-lgbtyouth.org/    Gender Spectrum, for Trans Youth  Gender Spectrum provides education, training and support to help create a gender sensitive and inclusive environment for all children and teens. http://www.genderspectrum.org/    Tom s FTM Resource Guide  Information on topics of interest to female-to-male (FTM, F2M) trans men, and their friends and loved  ones.  http://SundaySkyuide.org/    Also check out your local [facility] queer resource center!  LGBTQIA Services at [facility]: http://www.[facility]/lgbtqia/  LGBTQ@[facility] at [facility]: http://www.[facility]/multiculturallife/lgbtq/  GLBTA Programs office at [facility] : https://diversity.[facility]/glbta/            Thoughts of self harm?   Trans Lifeline can be reached at [phone].   This service is staffed by trans people 24/7.   For LGBT youth (ages 24 and younger) contemplating suicide, the [employer] Lifeline can be reached at [phone].       FEMINIZING Medications, Labs and f/u for Transwomen (MTF)Updated 6/2016  Drug Dose s/p Orchiectomy Typical patient Miscellaneous Details   Estradiol po  (Estrace) Start: 2mg pills daily at same time  Typical: 4-6mg     Max: 8mg Typical 1-2mg daily <41 yo, nonsmoker  good w/ daily meds Erectile dysfunction that is bothersome ? typical ED treatments.    Estradiol patch (Vivelle-Dot) apply 2x per wk  Start:  mcg/24 hr patch,   Typical: 100-200mcg daily  Max: 2 patches 2x/wk (200mcg/d) Typical: 37.5- 100mcg daily, changed 2x/wk Hx VTE, >40, smoker, transaminitis Patch falling off? Clean w/ EtOH first, apply & press x30sec. Baby oil to remove adhesive. Rx for 8 -16 patches/month.   Estradiol valerate (Delestrogen) Start: 5 mg IM weekly  Typical: 5-10 mg IM weekly  Max: 15-20 mg weekly 5mg IM weekly or 10 IM q2wk Ok w/ needles, >40, already on it Needles: 23-25gauge 1inch (if thigh) to 1.5inch (if glut), 1cc syringe, 18ga to draw up. Comes in 5ml bottles.   Estradiol cream  compounded Start: 50-100mcg to thighs daily  Typical: 100-200mcg   Max: 200 37.5-100mcg daily Ok w/ topical, paying out of pocket [facility]: [facility] [address] online, in PLO gel   Conj. Estrogens (premarin)  Typical: 5mg daily. Avoid if possible due to higher risk VTE Get IMAN & ensure informed of risk Consult local expert before long term continuation.    Spironolactone Start:  50-100mg po daily  Max: 200 (MOST)-300mg  DC Bothersome hair, acne or erections AntiAndrogen. Check bmp when on max dose. Split dose if dizzy.   Finasteride Start: 2.5mg po daily  Max: 5-10mg daily DC unless for alopecia Bothersome hair, alopecia, or erections AntiAndrogen - ok to use in combo w/ casper if poor feminizing effect   ?Progesterone? RARELY USE 200mg prometrium HS or q3mo depo shots IF ASKED ONLY DC Wants breast dev, no depression/obesity Causes wt gain and a lot of depression. Data inconclusive on breast developmt.     LABS Hgb Fasting Glu Fasting Lipid Total testosterone LFT s Prolactin Estrogen Ultrasens.   On shots? Draw labs  Midcycle! ( - way btw shots) -Baseline  -3 mo after   -6mo after  -then annual  After=after start or dose change Or A1c  -Baseline  -3 mo after   -6mo after  -then annual -Baseline  -3 mo after   -6mo after  -then annual -Baseline  -3 mo after   -6mo after  -q1yr.   Goal level <50 -Baseline  -3 mo after   -6mo after  -q1yr. Abnl?RUQ US, hep serology -if Sx only, rare.  If >100 needs f/u, MRI Check ONLY if on injectable or not clinically feminizing. Level should be 100-200.  >200 ?VTE risk and dose must be ?immediately, f/u in 1 month   On casper -Baseline BUN/Cr/lytes, Again when MAX dose  -annually thereafter, careful with ACEi s Follow up lab abnormalities w/repeat labs  UDon t panic ?may need small adjustment in dose.      -PPsychotherapy: not required! REC if: no consolidated gender, not gender dysphoric by criteria, nonconforming & may not need hormones  -E-Estrogen therapy is CONTRAINDICATED IF estrogen dependent cancer  -G-Gender related effects: Breast development, redistribution of body fat, softening of skin, ?mood, testes shrink, ?libido and ? fertility  -O-Other effects: ?VTE risk (steven if >40, smoker, obese, hx clots, po meds), ?Weight, emotional lability, ?lipids, ?erections, ?gallstones  **Upcoming surgery? HOLD ESTROGEN for 2-4 weeks prior to surgical procedure  Adapted by Nasra Johns MD from:  Dept Public Health, [facility] Trans* Center of Excellence, [facility] Guidelines, WPATH SOC-7.  Updated 6/2016  Trans Guidelines [facility]     MASCULINIZING Medications, Labs and f/u for Transmen (FTM)Updated 6/2016  Drug Dose s/p oophorectomy  Miscellaneous Details   Testosterone cypionate   (Depo Testosterone)  Start: 25-50mg IM qwk  Typical: 50-75mg IM qwk  Max: 100mg IM qwk Typical: 30-60mg IM qwk Needles: 23-25ga 1inch (for thigh) to 1.5inch (for glut), 1cc syringe, 18ga to draw up. Bottle sizes 1ml or 10ml. Allergy? Carrier oil is cottonseed oil   Testosterone enanthate   (Delatestryl) Start: 25-50mg IM qwk  Typical: 60-80mg IM qwk  Max: 100mg IM qwk Typical: 40-80mg IM qwk Allergy? Carrier oil is sesame oil.    Testosterone gel (Androgel 1.62%) Start: 40.25mg daily (2 pumps)  Typical: 40-80mg (2-4pumps)  Max: 6 pumps 1-2 pumps daily  4 hrs to dry, apply to shoulders & upper arms, behind knees thighs, armpits, can txfr to others.   40.25mg testosterone = 2.5g gel= 2 pumps   Testosterone gel  (Testim 1%) Start: 2.5 g daily  Typical: 5mg daily  Max: 10 g daily  4 hrs to dry, apply to shoulders & upper arms, can txfr to others. 50mg testosterone= 5 grams gel. Comes in premeasured tubes.   Testosterone Soln (Axiron)   Start: 60mg daily (2 pumps)  Typical: 60-120mg (2-4p) Max: 120 mg (4 p) 1-2 pumps daily 30mg testosterone = 1.5 ml solution = 1 pump  2 pumps = 1 pump each axilla   Androderm patch  Typical: 5-10m/24hr patch 2.5mg patch Disliked due to very large size, consider switch if possible   Testosterone proprionate 1-2% cream compounded Start: 25mg daily   Typical: 50-75mg daily  Max: 100mg daily 25-50mg daily Compounded.  Recommend [facility], [facility] [address] (monthly cost ~$40) or [facility] [address]. Compound in PLO gel, or ask for recs if not avail.     LABS Hgb Fasting Glu/A1C Fasting Lipid LFT s Total testosterone   On shots? Draw labs Mid  cycle!  (  way btw shots) After=after start or dose change -Baseline  -3 mo after   -6mo after  -then annual   -Baseline  -3 mo after   -6mo after  -then annual -Baseline  -3 mo after   -6mo after  -then annual  Statins prn. -Baseline  -3 mo after   -6mo after  -q1yr Abnl? Hep serology, RUQ US -Baseline  -3 mo after   -6mo after, then annual.  Goal range 300-1000,  push ? to induce changes, drop after 2yrs   -    -0Psychotherapy: not required! REC if: no consolidated gender, not gender dysphoric by criteria, nonconforming & may not need hormones      -Testosterone therapy is CONTRAINDICATED IF unstable psychosis, bipolar, or PREGNANCY. Need excellent contraception if sperm!  -   Gender related effects: stop menses, ?voice, ?facial/body hair, ?muscle mass, redistrib body fat, acne, ?libido, ?fertility, ?clitoris  -   Other effects: ?Weight, emotional lability, ?lipids, vaginal atrophy, ? BP, ?RBC, ?LFT s transiently, ?LAMBERT, ?endometrial hyperplasia steven  since higher incidence of PCOS in transmen    -surveillance of periods is critical for long term T  -   Testosterone is a controlled substance. Educate about this. Follow for reasonable refills. Paper rx required.    Adapted by Nasra Johns MD from:  Dept Public Health, [facility] Trans* Center of Excellence, [facility] Guidelines, WPATH SOC-7. Updated 6/2016 Trans Guidelines [facility]     Effects and Expected Time Course of Feminizing Hormones    Effect Expected Onset Expected Maximum Effect   Body Fat redistribution 3-6 months 2-5 years   Decreased Muscle mass 3-6 months 1-2 years   Softening of skin/decreased oiliness 3-6 months Unknown   Decreased Libido 1-3 months 1-2 years   Decreased Spontaneous Erections 1-3 months 3-6 months   Male Sexual Dysfunction Variable Variable   Breast Growth 3-6 months 2-3 years   Decreased Testicular volume 3-6 months 2-3 years   Decreased sperm production Variable Variable   Thinning and slowed growth of body and facial hair  + 6-12  months >3 years   Male pattern baldness No regrowth, loss stops 1-3 months 1-2 years   +complete removal of male facial hair and body hair requires electrolysis, laser treatment or both      Effects and Expected Time Course of Masculinizing  Hormones    Effect Expected Onset Expected Maximum Effect   Skin oiliness/Acne 1-6 months 1-2 years   Facial/body hair growth 3-6 months 3-5 years    Scalp hair loss >12 months+ Variable   Increased muscle mass/strength 6-12 months 2-5 years   Body fat redistribution 3-6 months 2-5 years   Cessation of menses 2-6 months n/a   Clitoral enlargement 3-6 months 1-2 years   Vaginal atrophy 3-6 months 1-2 years   Deepened voice 3-12 months 1-2 years                   Follow-ups after your visit        Follow-up notes from your care team     Return in about 2 weeks (around 8/30/2017).      Who to contact     Please call your clinic at [phone] to:    Ask questions about your health    Make or cancel appointments    Discuss your medicines    Learn about your test results    Speak to your doctor   If you have compliments or concerns about an experience at your clinic, or if you wish to file a complaint, please contact [facility] Patient Relations at [phone] or email us at [email or web address]         Additional Information About Your Visit        [employer] Information     [employer] gives you secure access to your electronic health record. If you see a primary care provider, you can also send messages to your care team and make appointments. If you have questions, please call your primary care clinic.  If you do not have a primary care provider, please call [phone] and they will assist you.      [employer] is an electronic gateway that provides easy, online access to your medical records. With [employer], you can request a clinic appointment, read your test results, renew a prescription or communicate with your care team.     To access your  "existing account, please contact your [facility] or call [phone] for assistance.        Care EveryWhere ID     This is your Care EveryWhere ID. This could be used by other organizations to access your [facility] medical records  [id number]        Your Vitals Were     Pulse Temperature Respirations Height Pulse Oximetry BMI (Body Mass Index)    86 98.3  F (36.8  C) (Oral) 16 6' 2\" (188 cm) 97% 16.23 kg/m2       Blood Pressure from Last 3 Encounters:   08/16/17 136/84   07/21/17 116/80    Weight from Last 3 Encounters:   08/16/17 126 lb 6.4 oz (57.3 kg)   07/21/17 125 lb (56.7 kg)              Today, you had the following     No orders found for display       Primary Care Provider Office Phone # Fax #    Won Ledesma [phone]       [facility] [address] [place] [id number]        Equal Access to Services     ALIA SHEARER : Hadii aad ku hadasho Soomaali, waaxda luqadaha, qaybta kaalmada adeegyada, waxay idiin haychelsin divya ortegaarapaige miramontes . So [facility] [phone].    ATENCIÓN: Si habla español, tiene a delgado disposición servicios gratuitos de asistencia lingüística. Rosie al [phone].    We comply with applicable federal civil rights laws and [place] laws. We do not discriminate on the basis of race, color, national origin, age, disability sex, sexual orientation or gender identity.            Thank you!     Thank you for choosing [facility]  for your care. Our goal is always to provide you with excellent care. Hearing back from our patients is one way we can continue to improve our services. Please take a few minutes to complete the written survey that you may receive in the mail after your visit with us. Thank you!             Your Updated Medication List - Protect others around you: Learn how to safely use, store and throw away your medicines at www.disposemymeds.org.      Notice  As of 8/16/2017  " 4:54 PM    You have not been prescribed any medications.

## 2017-08-16 NOTE — PATIENT INSTRUCTIONS
Here is the plan from today's visit    1. Gender dysphoria in adolescent and adult  Think about plan for estrogens, anti-androgen medications, come with questions and ideal plan for hormone replacement therapy.  Likely to start hormones in 2-4 weeks at next visit      Please call or return to clinic if your symptoms don't go away.    Follow up plan  Please make a clinic appointment for follow up with me (BARB SIMON) in 2-4  weeks.    Thank you for coming to [facility] today.  Lab Testing:  **If you had lab testing today and your results are reassuring or normal they will be mailed to you or sent through [facility] within 7 days.   **If the lab tests need quick action we will call you with the results.  The phone number we will call with results is # [phone] (home) . If this is not the best number please call our clinic and change the number.  Medication Refills:  If you need any refills please call your pharmacy and they will contact us.   If you need to  your refill at a new pharmacy, please contact the new pharmacy directly. The new pharmacy will help you get your medications transferred faster.   Scheduling:  If you have any concerns about today's visit or wish to schedule another appointment please call our office during normal business hours [phone] (8-5:00 M-F)  If a referral was made to a [facility] and you don't get a call from central scheduling please call [phone].  If a Mammogram was ordered for you at [facility] call [phone] to schedule or change your appointment.  If you had an XRay/CT/Ultrasound/MRI ordered the number is [phone] to schedule or change your radiology appointment.   Medical Concerns:  If you have urgent medical concerns please call [phone] at any time of the day.      Some online resources for transgender health    [place] [facility]   Home to [facility] at [address] [place] [address] [address]  [place], [phone]. Also has support groups.  http://www.[place]transhealth.org/    [facility] of Excellence for Transgender Health  Increasing access to comprehensive, effective, and affirming healthcare services for trans and gender-variant communities.  http://www.transhealth.[facility].edu/trans?page=ajith-00-05    [facility]   Community-based non-profit committed to advancing the health & wellness of LGBTQ communities through research, education and advocacy. http://www.[facility].org    [facility] Glossary of Transgender Terms  http://www.[facility].org/site/DocServer/Handout_7-C_Glossary_of_Gender_and_Transgender_Terms__fi.pdf?hctVG=2447    Safe, gender-neutral public restrooms in [facility]   http://www.[facility].org//index.php?option=com_content&task=view&id=12&Itemid    Trans Youth Support Network  For people 26 and under who identify as a trans or gender non-conforming person and want to be a part of an activist organization. Offers peer support, education and community building opportunities.   http://www.transyouthsupportnetwork.org/    Reclaim:  RECLAIM offers mental and integrative health services for LGBTQ youth and their families.  http://www.reclaim-lgbtyouth.org/    Gender Spectrum, for Trans Youth  Gender Spectrum provides education, training and support to help create a gender sensitive and inclusive environment for all children and teens. http://www.genderspectrum.org/    Tom s FTM Resource Guide  Information on topics of interest to female-to-male (FTM, F2M) trans men, and their friends and loved ones.  http://ftmguide.org/    Also check out your local [place] queer resource center!  LGBTQIA Services at [facility]: http://www.[facility]/lgbtqia/  LGBTQ@[facility] at [facility]: http://www.[facility]/multiculturallife/lgbtq/  GLBTA Programs office at [facility] : https://diversity.[facility].edu/glbta/            Thoughts of self harm?   Trans Lifeline can be reached at [phone].   This  service is staffed by trans people 24/7.   For LGBT youth (ages 24 and younger) contemplating suicide, the [employer] [facility] Lifeline can be reached at [phone].       FEMINIZING Medications, Labs and f/u for Transwomen (MTF)Updated 6/2016  Drug Dose s/p Orchiectomy Typical patient Miscellaneous Details   Estradiol po  (Estrace) Start: 2mg pills daily at same time  Typical: 4-6mg     Max: 8mg Typical 1-2mg daily <41 yo, nonsmoker  good w/ daily meds Erectile dysfunction that is bothersome ? typical ED treatments.    Estradiol patch (Vivelle-Dot) apply 2x per wk  Start:  mcg/24 hr patch,   Typical: 100-200mcg daily  Max: 2 patches 2x/wk (200mcg/d) Typical: 37.5- 100mcg daily, changed 2x/wk Hx VTE, >40, smoker, transaminitis Patch falling off? Clean w/ EtOH first, apply & press x30sec. Baby oil to remove adhesive. Rx for 8 -16 patches/month.   Estradiol valerate (Delestrogen) Start: 5 mg IM weekly  Typical: 5-10 mg IM weekly  Max: 15-20 mg weekly 5mg IM weekly or 10 IM q2wk Ok w/ needles, >40, already on it Needles: 23-25gauge 1inch (if thigh) to 1.5inch (if glut), 1cc syringe, 18ga to draw up. Comes in 5ml bottles.   Estradiol cream  compounded Start: 50-100mcg to thighs daily  Typical: 100-200mcg   Max: 200 37.5-100mcg daily Ok w/ topical, paying out of pocket [facility] [address], [facility] [address] online, in PLO gel   Conj. Estrogens (premarin)  Typical: 5mg daily. Avoid if possible due to higher risk VTE Get IMAN & ensure informed of risk Consult local expert before long term continuation.    Spironolactone Start: 50-100mg po daily  Max: 200 (MOST)-300mg  DC Bothersome hair, acne or erections AntiAndrogen. Check bmp when on max dose. Split dose if dizzy.   Finasteride Start: 2.5mg po daily  Max: 5-10mg daily DC unless for alopecia Bothersome hair, alopecia, or erections AntiAndrogen - ok to use in combo w/ casper if poor feminizing effect   ?Progesterone? RARELY USE 200mg prometrium HS or q3mo  depo shots IF ASKED ONLY DC Wants breast dev, no depression/obesity Causes wt gain and a lot of depression. Data inconclusive on breast developmt.     LABS Hgb Fasting Glu Fasting Lipid Total testosterone LFT s Prolactin Estrogen Ultrasens.   On shots? Draw labs  Midcycle! ( - way btw shots) -Baseline  -3 mo after   -6mo after  -then annual  After=after start or dose change Or A1c  -Baseline  -3 mo after   -6mo after  -then annual -Baseline  -3 mo after   -6mo after  -then annual -Baseline  -3 mo after   -6mo after  -q1yr.   Goal level <50 -Baseline  -3 mo after   -6mo after  -q1yr. Abnl?RUQ US, hep serology -if Sx only, rare.  If >100 needs f/u, MRI Check ONLY if on injectable or not clinically feminizing. Level should be 100-200.  >200 ?VTE risk and dose must be ?immediately, f/u in 1 month   On casper -Baseline BUN/Cr/lytes, Again when MAX dose  -annually thereafter, careful with ACEi s Follow up lab abnormalities w/repeat labs  UDon t panic ?may need small adjustment in dose.      -PPsychotherapy: not required! REC if: no consolidated gender, not gender dysphoric by criteria, nonconforming & may not need hormones  -E-Estrogen therapy is CONTRAINDICATED IF estrogen dependent cancer  -G-Gender related effects: Breast development, redistribution of body fat, softening of skin, ?mood, testes shrink, ?libido and ? fertility  -O-Other effects: ?VTE risk (steven if >40, smoker, obese, hx clots, po meds), ?Weight, emotional lability, ?lipids, ?erections, ?gallstones  **Upcoming surgery? HOLD ESTROGEN for 2-4 weeks prior to surgical procedure Adapted by Nasra Johns MD from: [place] [facility] Public [facility], [facility] Trans* Center of Excellence, [facility] Guidelines, WPATH SOC-7.  Updated 6/2016  Trans Guidelines [facility]     MASCULINIZING Medications, Labs and f/u for Transmen (FTM)Updated 6/2016  Drug Dose s/p oophorectomy  Miscellaneous Details   Testosterone cypionate   (Depo Testosterone)  Start: 25-50mg IM qwk  Typical:  50-75mg IM qwk  Max: 100mg IM qwk Typical: 30-60mg IM qwk Needles: 23-25ga 1inch (for thigh) to 1.5inch (for glut), 1cc syringe, 18ga to draw up. Bottle sizes 1ml or 10ml. Allergy? Carrier oil is cottonseed oil   Testosterone enanthate   (Delatestryl) Start: 25-50mg IM qwk  Typical: 60-80mg IM qwk  Max: 100mg IM qwk Typical: 40-80mg IM qwk Allergy? Carrier oil is sesame oil.    Testosterone gel (Androgel 1.62%) Start: 40.25mg daily (2 pumps)  Typical: 40-80mg (2-4pumps)  Max: 6 pumps 1-2 pumps daily  4 hrs to dry, apply to shoulders & upper arms, behind knees thighs, armpits, can txfr to others.   40.25mg testosterone = 2.5g gel= 2 pumps   Testosterone gel  (Testim 1%) Start: 2.5 g daily  Typical: 5mg daily  Max: 10 g daily  4 hrs to dry, apply to shoulders & upper arms, can txfr to others. 50mg testosterone= 5 grams gel. Comes in premeasured tubes.   Testosterone Soln (Axiron)   Start: 60mg daily (2 pumps)  Typical: 60-120mg (2-4p) Max: 120 mg (4 p) 1-2 pumps daily 30mg testosterone = 1.5 ml solution = 1 pump  2 pumps = 1 pump each axilla   Androderm patch  Typical: 5-10m/24hr patch 2.5mg patch Disliked due to very large size, consider switch if possible   Testosterone proprionate 1-2% cream compounded Start: 25mg daily   Typical: 50-75mg daily  Max: 100mg daily 25-50mg daily Compounded.  Recommend May s online pharmacy, [facility] on [address] (monthly cost ~$40) or [facility] on [address]. Compound in PLO gel, or ask for recs if not avail.     LABS Hgb Fasting Glu/A1C Fasting Lipid LFT s Total testosterone   On shots? Draw labs Mid  cycle! (  way btw shots) After=after start or dose change -Baseline  -3 mo after   -6mo after  -then annual   -Baseline  -3 mo after   -6mo after  -then annual -Baseline  -3 mo after   -6mo after  -then annual  Statins prn. -Baseline  -3 mo after   -6mo after  -q1yr Abnl? Hep serology, RUQ US -Baseline  -3 mo after   -6mo after, then annual.  Goal range 300-1000,  push ? to induce changes,  drop after 2yrs   -    -0Psychotherapy: not required! REC if: no consolidated gender, not gender dysphoric by criteria, nonconforming & may not need hormones      -Testosterone therapy is CONTRAINDICATED IF unstable psychosis, bipolar, or PREGNANCY. Need excellent contraception if sperm!  -   Gender related effects: stop menses, ?voice, ?facial/body hair, ?muscle mass, redistrib body fat, acne, ?libido, ?fertility, ?clitoris  -   Other effects: ?Weight, emotional lability, ?lipids, vaginal atrophy, ? BP, ?RBC, ?LFT s transiently, ?LAMBERT, ?endometrial hyperplasia steven  since higher incidence of PCOS in transmen    -surveillance of periods is critical for long term T  -   Testosterone is a controlled substance. Educate about this. Follow for reasonable refills. Paper rx required.    Adapted by Nasra Johns MD from:  Dept Public Health, [facility] Trans* Center of Excellence, [facility] Guidelines, WPATH SOC-7. Updated 6/2016 Trans Guidelines [facility]     Effects and Expected Time Course of Feminizing Hormones    Effect Expected Onset Expected Maximum Effect   Body Fat redistribution 3-6 months 2-5 years   Decreased Muscle mass 3-6 months 1-2 years   Softening of skin/decreased oiliness 3-6 months Unknown   Decreased Libido 1-3 months 1-2 years   Decreased Spontaneous Erections 1-3 months 3-6 months   Male Sexual Dysfunction Variable Variable   Breast Growth 3-6 months 2-3 years   Decreased Testicular volume 3-6 months 2-3 years   Decreased sperm production Variable Variable   Thinning and slowed growth of body and facial hair  + 6-12 months >3 years   Male pattern baldness No regrowth, loss stops 1-3 months 1-2 years   +complete removal of male facial hair and body hair requires electrolysis, laser treatment or both      Effects and Expected Time Course of Masculinizing  Hormones    Effect Expected Onset Expected Maximum Effect   Skin oiliness/Acne 1-6 months 1-2 years   Facial/body hair growth 3-6 months 3-5 years     Scalp hair loss >12 months+ Variable   Increased muscle mass/strength 6-12 months 2-5 years   Body fat redistribution 3-6 months 2-5 years   Cessation of menses 2-6 months n/a   Clitoral enlargement 3-6 months 1-2 years   Vaginal atrophy 3-6 months 1-2 years   Deepened voice 3-12 months 1-2 years

## 2017-08-16 NOTE — PROGRESS NOTES
Preceptor Attestation:   Patient seen and discussed with the resident. Assessment and plan reviewed with resident and agreed upon.   Supervising Physician:  Geraldine Lezama MD  [facility]

## 2017-08-16 NOTE — PROGRESS NOTES
"          LENARD Gonzalez is a 27 year old individual that uses pronouns She/Her/Hers/Herself that presents today for follow up of:  feminizing hormone therapy. Gender identity: Female    Any special concerns today?  no current concerns    On hormones?  No  ---    Past Surgical History:   Procedure Laterality Date     NO HISTORY OF SURGERY         Patient Active Problem List   Diagnosis     Gender dysphoria in adolescent and adult       No current outpatient prescriptions on file.       History   Smoking Status     Never Smoker   Smokeless Tobacco     Never Used        No Known Allergies    Problem, Medication and Allergy Lists were reviewed and updated if needed..         Review of Systems:      General    Fat redistribution: no    Weight change: no HEENT    Voice change: no     Cardiovascular (CV)    Chest Pains: no    Shortness of breath: no Chest    Decreased exercise tolerance:  no    Breast changes/development: no     Gastrointestinal (GI)    Abdominal pain: no    Change in appetite: no Skin    Acne or oily skin: no    Change in hair: no     Genitourinary ()    Abnormal vaginal bleeding: not applicable     Decreased spontaneous erections: no    Change in libido: no    New sexual partners: no Musculoskeletal    Leg pain or swelling: no     Psychiatric (Psych)    Depression: no    Anxiety/Panic: no    Mood:  \"A little sleepy but good\"                    Physical Exam:     Vitals:    08/16/17 1559   BP: 136/84   Pulse: 86   Resp: 16   Temp: 98.3  F (36.8  C)   TempSrc: Oral   SpO2: 97%   Weight: 126 lb 6.4 oz (57.3 kg)   Height: 6' 2\" (188 cm)     BMI= Body mass index is 16.23 kg/(m^2).   Wt Readings from Last 10 Encounters:   08/16/17 126 lb 6.4 oz (57.3 kg)   07/21/17 125 lb (56.7 kg)     Appearance: Both appearance and dress    GENERAL:: healthy, alert and no distress  EYES: Eyes grossly normal to inspection, fundi benign and PERRL  HENT: ear canals normal, TM's normal, Nose normal, Mouth- no ulcers, no " lesions  NECK: no adenopathy, no asymmetry, no masses,  and thyroid normal to palpation, supple  RESP: lungs clear to auscultation - no rales, no rhonchi, no wheezes  CV: regular rates and rhythm, normal S1 S2, no S3 or S4 and no murmur, no click or rub -  ABDOMEN: soft, no tenderness, no  hepatosplenomegaly, no masses, normal bowel sounds  SKIN: no suspicious lesions, no rashes  Affect: Appropriate/mood-congruent           Labs:   Results from last visit:  Orders Only on 07/29/2017   Component Date Value Ref Range Status     Testosterone Total 07/29/2017 515  240 - 950 ng/dL Final    Comment: This test was developed and its performance characteristics determined by the   [facility],  Special Chemistry Laboratory. It has   not been cleared or approved by the FDA. The laboratory is regulated under   CLIA   as qualified to perform high-complexity testing. This test is used for   clinical   purposes. It should not be regarded as investigational or for research.       Sodium 07/29/2017 140  133 - 144 mmol/L Final     Potassium 07/29/2017 3.9  3.4 - 5.3 mmol/L Final     Chloride 07/29/2017 105  94 - 109 mmol/L Final     Carbon Dioxide 07/29/2017 29  20 - 32 mmol/L Final     Anion Gap 07/29/2017 6  3 - 14 mmol/L Final     Glucose 07/29/2017 83  70 - 99 mg/dL Final     Urea Nitrogen 07/29/2017 12  7 - 30 mg/dL Final     Creatinine 07/29/2017 0.74  0.66 - 1.25 mg/dL Final     GFR Estimate 07/29/2017   >60 mL/min/1.7m2 Final                    Value:>90  Non  GFR Calc       GFR Estimate If Black 07/29/2017   >60 mL/min/1.7m2 Final                    Value:>90   GFR Calc       Calcium 07/29/2017 9.2  8.5 - 10.1 mg/dL Final     Bilirubin Total 07/29/2017 1.1  0.2 - 1.3 mg/dL Final     Albumin 07/29/2017 4.6  3.4 - 5.0 g/dL Final     Protein Total 07/29/2017 7.7  6.8 - 8.8 g/dL Final     Alkaline Phosphatase 07/29/2017 66  40 - 150 U/L Final     ALT 07/29/2017 23  0 -  70 U/L Final     AST 07/29/2017 16  0 - 45 U/L Final     Hemoglobin 07/29/2017 17.0  13.3 - 17.7 g/dL Final     Cholesterol 07/29/2017 136  <200 mg/dL Final     Triglycerides 07/29/2017 45  <150 mg/dL Final     HDL Cholesterol 07/29/2017 51  >39 mg/dL Final     LDL Cholesterol Calculated 07/29/2017 76  <100 mg/dL Final    Desirable:       <100 mg/dl     Non HDL Cholesterol 07/29/2017 85  <130 mg/dL Final       Assessment and Plan     There are no diagnoses linked to this encounter.      Contraception:  Not sexually active, has not been sexually active.  Would plan on contraception, unknown means currently, if she becomes sexually active   .   Counselled patient about controlled substances: No, denies alcohol, smoking, drugs    Follow up:  Follow up in 2 -4 weeks after researching/discussing HRT options, meds, etc. And having tentive plan/questions; likely starting hormones next visit.      [facility]  Questions were elicited and answered.     Won Ledesma MD

## 2017-08-24 ASSESSMENT — ANXIETY QUESTIONNAIRES
2. NOT BEING ABLE TO STOP OR CONTROL WORRYING: NOT AT ALL
6. BECOMING EASILY ANNOYED OR IRRITABLE: NOT AT ALL
5. BEING SO RESTLESS THAT IT IS HARD TO SIT STILL: SEVERAL DAYS
1. FEELING NERVOUS, ANXIOUS, OR ON EDGE: SEVERAL DAYS
IF YOU CHECKED OFF ANY PROBLEMS ON THIS QUESTIONNAIRE, HOW DIFFICULT HAVE THESE PROBLEMS MADE IT FOR YOU TO DO YOUR WORK, TAKE CARE OF THINGS AT HOME, OR GET ALONG WITH OTHER PEOPLE: NOT DIFFICULT AT ALL
7. FEELING AFRAID AS IF SOMETHING AWFUL MIGHT HAPPEN: NOT AT ALL
GAD7 TOTAL SCORE: 2
3. WORRYING TOO MUCH ABOUT DIFFERENT THINGS: NOT AT ALL

## 2017-08-24 ASSESSMENT — PATIENT HEALTH QUESTIONNAIRE - PHQ9
5. POOR APPETITE OR OVEREATING: NOT AT ALL
SUM OF ALL RESPONSES TO PHQ QUESTIONS 1-9: 2

## 2017-08-25 ASSESSMENT — ANXIETY QUESTIONNAIRES: GAD7 TOTAL SCORE: 2

## 2017-09-14 ENCOUNTER — OFFICE VISIT (OUTPATIENT)
Dept: FAMILY MEDICINE | Facility: CLINIC | Age: 27
End: 2017-09-14

## 2017-09-14 VITALS
DIASTOLIC BLOOD PRESSURE: 67 MMHG | HEART RATE: 79 BPM | WEIGHT: 129 LBS | RESPIRATION RATE: 16 BRPM | SYSTOLIC BLOOD PRESSURE: 121 MMHG | OXYGEN SATURATION: 95 % | BODY MASS INDEX: 16.56 KG/M2

## 2017-09-14 DIAGNOSIS — F64.0 GENDER DYSPHORIA IN ADOLESCENT AND ADULT: Primary | ICD-10-CM

## 2017-09-14 RX ORDER — ESTRADIOL 2 MG/1
2 TABLET ORAL DAILY
Qty: 30 TABLET | Refills: 2 | Status: SHIPPED | OUTPATIENT
Start: 2017-09-14 | End: 2017-11-14

## 2017-09-14 RX ORDER — SPIRONOLACTONE 100 MG/1
100 TABLET, FILM COATED ORAL DAILY
Qty: 60 TABLET | Refills: 3 | Status: SHIPPED | OUTPATIENT
Start: 2017-09-14 | End: 2017-11-14

## 2017-09-14 RX ORDER — SPIRONOLACTONE 50 MG/1
50 TABLET, FILM COATED ORAL DAILY
Qty: 60 TABLET | Refills: 1 | Status: SHIPPED | OUTPATIENT
Start: 2017-09-14 | End: 2017-11-14

## 2017-09-14 NOTE — PATIENT INSTRUCTIONS
"          Informed Consent   Feminizing Medications      This form refers to the use of estrogen and/or androgen antagonists (sometimes called \"anti-androgens\" or \"androgen blockers\") by persons in the male-to-female spectrum who wish to become feminized to reduce gender dysphoria and facilitate a more feminine gender presentation. While there are risks associated with taking feminizing medications, when appropriately prescribed they can greatly improve mental health and quality of life.     Please seek another opinion if you want additional perspective on any aspect of your care.       Feminizing Effects     1. I understand that estrogen, androgen antagonists, or a combination of the two may be  prescribed to reduce male physical features and feminize my body.     2. I understand that the feminizing effects of estrogen and androgen antagonists can take several  months to a year to become noticeable, speed and degree of change is unpredictable.     3.  I understand that if I am taking estrogen I will probably develop breasts, and:        ? Breasts may take several years to develop to their full size.   ? Even if estrogen is stopped, the breast tissue that has developed will remain.   ? As soon as breasts start growing, it is recommended to have an annual breast exam.  ? There may be milky nipple discharge (galactorrhea). If you develop this, it is advised to check with a doctor to determine the cause.   ? It is not known if taking estrogen increases the risk of breast cancer.     4. I understand that the following changes are generally not permanent (that is, they will likely reverse if I stop taking feminizing medications):     ? Skin may become softer.   ? Muscle mass decreases and there may be a decrease in upper body strength.   ? Body hair growth may become less noticeable and grow more slowly,but won't stop.  ? Male pattern baldness may slow down, but will probably not stop completely, and hair that has " "already been lost will likely not grow back.   ? Fat may redistribute to a more feminine pattern (decreased in abdomen, increased on buttocks/hips/thighs - changing from \"apple shape\" to \"pear shape\").       5. I understand that taking feminizing medications will make my testicles produce less testosterone, which can affect my overall sexual function:    ? Fertility may be impaired, and I should consider sperm banking if I desire biological children.  ? Sperm may not mature, leading to reduced fertility. It is still possible to get someone pregnant however and contraception should be used if needed.  ? Testicles may shrink by 25-50%. Testicular examinations are still recommended.  ? The amount of fluid ejaculated may be reduced.   ? There is typically decrease in morning and spontaneous erections.   ? Erections may not be firm enough for penetrative sex.   ? Libido (sex drive) may decrease.     6. I understand that there are some aspects of my body that are not significantly changed by feminizing medications, though there may be other treatments that can be helpful.    ? Blanton/facial hair may grow more slowly and be less noticeable, but will not go away.   ? Voice pitch will not rise and speech patterns will not become more feminine.   ? The laryngeal prominence (\"Adalberto's apple\") will not shrink.      Risks of Feminizing Medications     7. I understand that the medical effects and safety of feminizing medications are not fully understood, and that there may be long-term risks that are not yet known.     8. I understand that I am strongly advised not to take more medication than I am prescribed, as this increases health risks. It won't help changes come faster.     9. I understand that feminizing medications can damage the liver. I have been advised that I should be monitored for possible liver damage as long as I am taking feminizing medications.     10. I understand that feminizing medications will result in changes " that will be noticeable by other people, and that some people in similar circumstances have experienced harassment, discrimination, and violence, while others have lost support of loved ones. I have been advised that referrals can be made for support/counselling if this would be helpful.     Medical Risks Associated with Estrogen     11. I understand that taking estrogen increases the risk of blood clots, which can result in:     ? Pulmonary embolism (blood clot to the lungs), which may cause death.  Stroke, which may cause permanent brain damage or death   ? Heart attack   ? Chronic leg vein problems     I understand that the risk of blood clots is much worse if I smoke cigarettes, especially over age 40. I understand that the danger is so high that I should stop smoking completely if I start taking estrogen. I can ask my doctor for advice on quitting.    12. I understand that taking estrogen can increase deposits of fat around my internal organs, which is associated with increased risk for diabetes and heart disease.     13. I understand that taking estrogen can cause increased blood pressure,  estrogen increases the risk of gallstones,  estrogen can cause headaches or migraines and can cause nausea and vomiting. I have been advised that if I develop these, it is recommended that I discuss this with my doctor.     14. I understand that it is not known if taking estrogen increases the risk of non-cancerous tumors of the pituitary gland. I have been informed that although this is typically not life-threatening, it can damage vision. I understand that this will be monitored.    15. I have been informed that I am more likely to have dangerous side effects from estrogen if I smoke, am overweight, am over 40 years old, or have a history of blood clots, high blood pressure, or a family history of breast cancer.     16. I have been informed that if I take too much estrogen, my body may convert it into testosterone, which  may slow or stop feminization.     Medical Risks Associated with Androgen Antagonists     17. I have been informed that spironolactone affects the balance of water and salts in the kidneys, and that this may:     ? Increase the amount of urine produced, and I may urinate more frequently   ? Reduce blood pressure   ? Rarely, cause high levels of potassium in the blood, and this will be monitored    18. I understand that some androgen antagonists make it more difficult to evaluate the results of PSA test. If I am over 50, I should have my prostate evaluated every year.     Prevention of Medical Complications     19. I agree to take feminizing medications as prescribed and to tell my care provider if I am not happy with the treatment or am experiencing any problems.     20. I understand that the right dose or type of medication prescribed for me may not be the same as for someone else.     21. I understand that physical examinations and blood tests are needed on a regular basis (monthly, at first) to check for negative side effects of feminizing medications.     22. I understand that feminization medications can interact with other medication (including other sources of hormones), dietary supplements, herbs, alcohol, and street drugs. I understand that being honest with my care provider about what else I am taking will help prevent medical complications that could be life-threatening. I have been informed that I will continue to get medical care no matter what information I share.     23. I understand that some medical conditions make it dangerous to take estrogen or androgen antagonists. I agree to be checked for risky conditions before the decision to start or continue feminizing medication is made.     24. I understand that I can choose to stop taking feminizing medication at any time, and that it is advised that I do this with the help of my doctor to make sure there are no negative reactions to stopping. I  understand that my doctor may suggest I reduce, switch or stop taking feminizing medication, if there are severe health risks that can't be controlled.    My signature below confirms that:     ? My doctor has talked with me about the benefits and risks of feminizing medication, the possible or likely consequences of hormone therapy, and potential treatment options.   ? I understand the risks that may be involved.   ? I understand that this form covers known effects and risks and that there may be long-term effects or risks that are not yet known.   ? I have had sufficient opportunity to discuss treatment options with my doctor. All of my questions have been answered to my satisfaction.   ? I believe I have adequate knowledge on which to base informed consent to the provision of feminizing medication.     Based on this:     _____ I wish to begin taking estrogen.     _____ I wish to begin taking androgen antagonists (e.g., Spironolactone).     _____ I do not wish to begin taking feminizing medication at this time.     Whatever your current decision is, please talk with your doctor any time you have questions, concerns, or want to re-evaluate your options.         ____________________________________ __________________   Patient Signature     Date         ____________________________________ __________________   Prescribing Clinician Signature    Date        Here is the plan from today's visit    1. Gender dysphoria in adolescent and adult  Follow up in 1 month for labs to check electrolytes, liver, and renal function.  At that time can discuss increasing one or both medications based on how medications are being tolerated at that time.    - estradiol (ESTRACE) 2 MG tablet; Take 1 tablet (2 mg) by mouth daily  Dispense: 30 tablet; Refill: 2  - spironolactone (ALDACTONE) 100 MG tablet; Take 1 tablet (100 mg) by mouth daily  Dispense: 60 tablet; Refill: 3  - spironolactone (ALDACTONE) 50 MG tablet; Take 1 tablet (50 mg)  by mouth daily  Dispense: 60 tablet; Refill: 1  - Comprehensive Metabolic Panel ([facility]); Future      Please call or return to clinic if your symptoms don't go away.    Follow up plan  Please make a clinic appointment for follow up with me (BARB SIMON) in 1 month    Thank you for coming to [facility] today.  Lab Testing:  **If you had lab testing today and your results are reassuring or normal they will be mailed to you or sent through [employer] within 7 days.   **If the lab tests need quick action we will call you with the results.  The phone number we will call with results is # [phone] (home) . If this is not the best number please call our clinic and change the number.  Medication Refills:  If you need any refills please call your pharmacy and they will contact us.   If you need to  your refill at a new pharmacy, please contact the new pharmacy directly. The new pharmacy will help you get your medications transferred faster.   Scheduling:  If you have any concerns about today's visit or wish to schedule another appointment please call our office during normal business hours [phone] (8-5:00 M-F)  If a referral was made to a [facility] and you don't get a call from central scheduling please call [phone].  If a Mammogram was ordered for you at [facility] call [phone] to schedule or change your appointment.  If you had an XRay/CT/Ultrasound/MRI ordered the number is [phone] to schedule or change your radiology appointment.   Medical Concerns:  If you have urgent medical concerns please call [phone] at any time of the day.

## 2017-09-14 NOTE — PROGRESS NOTES
Preceptor Attestation:   Patient seen and discussed with the resident.   Assessment and plan reviewed with resident and agreed upon.   Supervising Physician:  Giuliano Griffin MD  [facility]

## 2017-09-14 NOTE — MR AVS SNAPSHOT
"              After Visit Summary   9/14/2017    Carlos Yu    MRN: [id number]           Patient Information     Date Of Birth          1990        Visit Information        Provider Department      9/14/2017 1:40 PM Won Ledesma MD [facility]        Today's Diagnoses     Gender dysphoria in adolescent and adult    -  1      Care Instructions              Informed Consent   Feminizing Medications      This form refers to the use of estrogen and/or androgen antagonists (sometimes called \"anti-androgens\" or \"androgen blockers\") by persons in the male-to-female spectrum who wish to become feminized to reduce gender dysphoria and facilitate a more feminine gender presentation. While there are risks associated with taking feminizing medications, when appropriately prescribed they can greatly improve mental health and quality of life.     Please seek another opinion if you want additional perspective on any aspect of your care.       Feminizing Effects     1. I understand that estrogen, androgen antagonists, or a combination of the two may be  prescribed to reduce male physical features and feminize my body.     2. I understand that the feminizing effects of estrogen and androgen antagonists can take several  months to a year to become noticeable, speed and degree of change is unpredictable.     3.  I understand that if I am taking estrogen I will probably develop breasts, and:        ? Breasts may take several years to develop to their full size.   ? Even if estrogen is stopped, the breast tissue that has developed will remain.   ? As soon as breasts start growing, it is recommended to have an annual breast exam.  ? There may be milky nipple discharge (galactorrhea). If you develop this, it is advised to check with a doctor to determine the cause.   ? It is not known if taking estrogen increases the risk of breast cancer.     4. I understand that the following changes are " "generally not permanent (that is, they will likely reverse if I stop taking feminizing medications):     ? Skin may become softer.   ? Muscle mass decreases and there may be a decrease in upper body strength.   ? Body hair growth may become less noticeable and grow more slowly,but won't stop.  ? Male pattern baldness may slow down, but will probably not stop completely, and hair that has already been lost will likely not grow back.   ? Fat may redistribute to a more feminine pattern (decreased in abdomen, increased on buttocks/hips/thighs - changing from \"apple shape\" to \"pear shape\").       5. I understand that taking feminizing medications will make my testicles produce less testosterone, which can affect my overall sexual function:    ? Fertility may be impaired, and I should consider sperm banking if I desire biological children.  ? Sperm may not mature, leading to reduced fertility. It is still possible to get someone pregnant however and contraception should be used if needed.  ? Testicles may shrink by 25-50%. Testicular examinations are still recommended.  ? The amount of fluid ejaculated may be reduced.   ? There is typically decrease in morning and spontaneous erections.   ? Erections may not be firm enough for penetrative sex.   ? Libido (sex drive) may decrease.     6. I understand that there are some aspects of my body that are not significantly changed by feminizing medications, though there may be other treatments that can be helpful.    ? Blanton/facial hair may grow more slowly and be less noticeable, but will not go away.   ? Voice pitch will not rise and speech patterns will not become more feminine.   ? The laryngeal prominence (\"Adalberto's apple\") will not shrink.      Risks of Feminizing Medications     7. I understand that the medical effects and safety of feminizing medications are not fully understood, and that there may be long-term risks that are not yet known.     8. I understand that I am " strongly advised not to take more medication than I am prescribed, as this increases health risks. It won't help changes come faster.     9. I understand that feminizing medications can damage the liver. I have been advised that I should be monitored for possible liver damage as long as I am taking feminizing medications.     10. I understand that feminizing medications will result in changes that will be noticeable by other people, and that some people in similar circumstances have experienced harassment, discrimination, and violence, while others have lost support of loved ones. I have been advised that referrals can be made for support/counselling if this would be helpful.     Medical Risks Associated with Estrogen     11. I understand that taking estrogen increases the risk of blood clots, which can result in:     ? Pulmonary embolism (blood clot to the lungs), which may cause death.  Stroke, which may cause permanent brain damage or death   ? Heart attack   ? Chronic leg vein problems     I understand that the risk of blood clots is much worse if I smoke cigarettes, especially over age 40. I understand that the danger is so high that I should stop smoking completely if I start taking estrogen. I can ask my doctor for advice on quitting.    12. I understand that taking estrogen can increase deposits of fat around my internal organs, which is associated with increased risk for diabetes and heart disease.     13. I understand that taking estrogen can cause increased blood pressure,  estrogen increases the risk of gallstones,  estrogen can cause headaches or migraines and can cause nausea and vomiting. I have been advised that if I develop these, it is recommended that I discuss this with my doctor.     14. I understand that it is not known if taking estrogen increases the risk of non-cancerous tumors of the pituitary gland. I have been informed that although this is typically not life-threatening, it can damage  vision. I understand that this will be monitored.    15. I have been informed that I am more likely to have dangerous side effects from estrogen if I smoke, am overweight, am over 40 years old, or have a history of blood clots, high blood pressure, or a family history of breast cancer.     16. I have been informed that if I take too much estrogen, my body may convert it into testosterone, which may slow or stop feminization.     Medical Risks Associated with Androgen Antagonists     17. I have been informed that spironolactone affects the balance of water and salts in the kidneys, and that this may:     ? Increase the amount of urine produced, and I may urinate more frequently   ? Reduce blood pressure   ? Rarely, cause high levels of potassium in the blood, and this will be monitored    18. I understand that some androgen antagonists make it more difficult to evaluate the results of PSA test. If I am over 50, I should have my prostate evaluated every year.     Prevention of Medical Complications     19. I agree to take feminizing medications as prescribed and to tell my care provider if I am not happy with the treatment or am experiencing any problems.     20. I understand that the right dose or type of medication prescribed for me may not be the same as for someone else.     21. I understand that physical examinations and blood tests are needed on a regular basis (monthly, at first) to check for negative side effects of feminizing medications.     22. I understand that feminization medications can interact with other medication (including other sources of hormones), dietary supplements, herbs, alcohol, and street drugs. I understand that being honest with my care provider about what else I am taking will help prevent medical complications that could be life-threatening. I have been informed that I will continue to get medical care no matter what information I share.     23. I understand that some medical  conditions make it dangerous to take estrogen or androgen antagonists. I agree to be checked for risky conditions before the decision to start or continue feminizing medication is made.     24. I understand that I can choose to stop taking feminizing medication at any time, and that it is advised that I do this with the help of my doctor to make sure there are no negative reactions to stopping. I understand that my doctor may suggest I reduce, switch or stop taking feminizing medication, if there are severe health risks that can't be controlled.    My signature below confirms that:     ? My doctor has talked with me about the benefits and risks of feminizing medication, the possible or likely consequences of hormone therapy, and potential treatment options.   ? I understand the risks that may be involved.   ? I understand that this form covers known effects and risks and that there may be long-term effects or risks that are not yet known.   ? I have had sufficient opportunity to discuss treatment options with my doctor. All of my questions have been answered to my satisfaction.   ? I believe I have adequate knowledge on which to base informed consent to the provision of feminizing medication.     Based on this:     _____ I wish to begin taking estrogen.     _____ I wish to begin taking androgen antagonists (e.g., Spironolactone).     _____ I do not wish to begin taking feminizing medication at this time.     Whatever your current decision is, please talk with your doctor any time you have questions, concerns, or want to re-evaluate your options.         ____________________________________ __________________   Patient Signature     Date         ____________________________________ __________________   Prescribing Clinician Signature    Date        Here is the plan from today's visit    1. Gender dysphoria in adolescent and adult  Follow up in 1 month for labs to check electrolytes, liver, and renal function.  At  that time can discuss increasing one or both medications based on how medications are being tolerated at that time.    - estradiol (ESTRACE) 2 MG tablet; Take 1 tablet (2 mg) by mouth daily  Dispense: 30 tablet; Refill: 2  - spironolactone (ALDACTONE) 100 MG tablet; Take 1 tablet (100 mg) by mouth daily  Dispense: 60 tablet; Refill: 3  - spironolactone (ALDACTONE) 50 MG tablet; Take 1 tablet (50 mg) by mouth daily  Dispense: 60 tablet; Refill: 1  - Comprehensive Metabolic Panel ([facility]); Future      Please call or return to clinic if your symptoms don't go away.    Follow up plan  Please make a clinic appointment for follow up with me (BARB SIMON) in 1 month    Thank you for coming to [facility] today.  Lab Testing:  **If you had lab testing today and your results are reassuring or normal they will be mailed to you or sent through [employer] within 7 days.   **If the lab tests need quick action we will call you with the results.  The phone number we will call with results is # [phone] (home) . If this is not the best number please call our clinic and change the number.  Medication Refills:  If you need any refills please call your pharmacy and they will contact us.   If you need to  your refill at a new pharmacy, please contact the new pharmacy directly. The new pharmacy will help you get your medications transferred faster.   Scheduling:  If you have any concerns about today's visit or wish to schedule another appointment please call our office during normal business hours [phone] (8-5:00 M-F)  If a referral was made to a [facility] and you don't get a call from central scheduling please call [phone].  If a Mammogram was ordered for you at [facility] call [phone] to schedule or change your appointment.  If you had an XRay/CT/Ultrasound/MRI ordered the number is [phone] to schedule or change your radiology appointment.   Medical  Concerns:  If you have urgent medical concerns please call [phone] at any time of the day.            Follow-ups after your visit        Future tests that were ordered for you today     Open Future Orders        Priority Expected Expires Ordered    Comprehensive Metabolic Panel ([facility]) Routine  9/14/2018 9/14/2017            Who to contact     Please call your clinic at [phone] to:    Ask questions about your health    Make or cancel appointments    Discuss your medicines    Learn about your test results    Speak to your doctor   If you have compliments or concerns about an experience at your clinic, or if you wish to file a complaint, please contact [facility] Patient Relations at [phone] or email us at [email or web address]         Additional Information About Your Visit        [employer] Information     [employer] gives you secure access to your electronic health record. If you see a primary care provider, you can also send messages to your care team and make appointments. If you have questions, please call your primary care clinic.  If you do not have a primary care provider, please call [phone] and they will assist you.      [employer] is an electronic gateway that provides easy, online access to your medical records. With [employer], you can request a clinic appointment, read your test results, renew a prescription or communicate with your care team.     To access your existing account, please contact your [facility] Clinic or call [phone] for assistance.        Care EveryWhere ID     This is your Care EveryWhere ID. This could be used by other organizations to access your [facility] medical records  [id number]        Your Vitals Were     Pulse Respirations Pulse Oximetry BMI (Body Mass Index)          79 16 95% 16.56 kg/m2         Blood Pressure from Last 3 Encounters:   09/14/17 121/67   08/16/17 136/84   07/21/17 116/80    Weight  from Last 3 Encounters:   09/14/17 129 lb (58.5 kg)   08/16/17 126 lb 6.4 oz (57.3 kg)   07/21/17 125 lb (56.7 kg)                 Today's Medication Changes          These changes are accurate as of: 9/14/17  2:32 PM.  If you have any questions, ask your nurse or doctor.               Start taking these medicines.        Dose/Directions    estradiol 2 MG tablet   Commonly known as:  ESTRACE   Used for:  Gender dysphoria in adolescent and adult   Started by:  Won Ledesma MD        Dose:  2 mg   Take 1 tablet (2 mg) by mouth daily   Quantity:  30 tablet   Refills:  2       * spironolactone 100 MG tablet   Commonly known as:  ALDACTONE   Used for:  Gender dysphoria in adolescent and adult   Started by:  Won Ledesma MD        Dose:  100 mg   Take 1 tablet (100 mg) by mouth daily   Quantity:  60 tablet   Refills:  3       * spironolactone 50 MG tablet   Commonly known as:  ALDACTONE   Used for:  Gender dysphoria in adolescent and adult   Started by:  Won Ledesma MD        Dose:  50 mg   Take 1 tablet (50 mg) by mouth daily   Quantity:  60 tablet   Refills:  1       * Notice:  This list has 2 medication(s) that are the same as other medications prescribed for you. Read the directions carefully, and ask your doctor or other care provider to review them with you.         Where to get your medicines      These medications were sent to [facility] [place], [place] - [address] [facility] [address]     Phone:  [phone]     estradiol 2 MG tablet    spironolactone 100 MG tablet    spironolactone 50 MG tablet                Primary Care Provider Office Phone # Fax #    Won Ledesma [phone]       [facility] [address]  [facility] [address]        Equal Access to Services     ALIA SHEARER AH: Flower Mckenzie, dave pearson, papa land  binh miramontes ah. So [facility] [phone].    ATENCIÓN: Si angelo howe, tiene a delgado disposición servicios gratuitos de asistencia lingüística. Rosie al [phone].    We comply with applicable federal civil rights laws and [place] laws. We do not discriminate on the basis of race, color, national origin, age, disability sex, sexual orientation or gender identity.            Thank you!     Thank you for choosing [facility]  for your care. Our goal is always to provide you with excellent care. Hearing back from our patients is one way we can continue to improve our services. Please take a few minutes to complete the written survey that you may receive in the mail after your visit with us. Thank you!             Your Updated Medication List - Protect others around you: Learn how to safely use, store and throw away your medicines at www.disposemymeds.org.          This list is accurate as of: 9/14/17  2:32 PM.  Always use your most recent med list.                   Brand Name Dispense Instructions for use Diagnosis    estradiol 2 MG tablet    ESTRACE    30 tablet    Take 1 tablet (2 mg) by mouth daily    Gender dysphoria in adolescent and adult       * spironolactone 100 MG tablet    ALDACTONE    60 tablet    Take 1 tablet (100 mg) by mouth daily    Gender dysphoria in adolescent and adult       * spironolactone 50 MG tablet    ALDACTONE    60 tablet    Take 1 tablet (50 mg) by mouth daily    Gender dysphoria in adolescent and adult       * Notice:  This list has 2 medication(s) that are the same as other medications prescribed for you. Read the directions carefully, and ask your doctor or other care provider to review them with you.

## 2017-09-14 NOTE — PROGRESS NOTES
"          LENARD Gonzalez is a 27 year old individual that uses pronouns She/Her/Hers/Herself that presents today for follow up of:  feminizing hormone therapy. Gender identity: Female    Any special concerns today?  no current concerns    On hormones?  No, looking to start today  ---    Past Surgical History:   Procedure Laterality Date     NO HISTORY OF SURGERY         Patient Active Problem List   Diagnosis     Gender dysphoria in adolescent and adult     Mild intermittent asthma       No current outpatient prescriptions on file.       History   Smoking Status     Never Smoker   Smokeless Tobacco     Never Used        No Known Allergies    Problem, Medication and Allergy Lists were reviewed and updated if needed..         Review of Systems:      General    Fat redistribution: no    Weight change: no HEENT    Voice change: no     Cardiovascular (CV)    Chest Pains: no    Shortness of breath: no Chest    Decreased exercise tolerance:  no    Breast changes/development: no     Gastrointestinal (GI)    Abdominal pain: no    Change in appetite: no Skin    Acne or oily skin: no    Change in hair: no     Genitourinary ()    Abnormal vaginal bleeding: not applicable     Decreased spontaneous erections: no    Change in libido: no    New sexual partners: no Musculoskeletal    Leg pain or swelling: no     Psychiatric (Psych)    Depression: no    Anxiety/Panic: no    Mood:  \"good\"                    Physical Exam:     Vitals:    09/14/17 1349   BP: 121/67   Pulse: 79   Resp: 16   SpO2: 95%   Weight: 129 lb (58.5 kg)     BMI= Body mass index is 16.56 kg/(m^2).   Wt Readings from Last 10 Encounters:   09/14/17 129 lb (58.5 kg)   08/16/17 126 lb 6.4 oz (57.3 kg)   07/21/17 125 lb (56.7 kg)     Appearance: Male appearance and dress    GENERAL:: healthy, alert and no distress  EYES: Eyes grossly normal to inspection, fundi benign and PERRL  HENT: ear canals normal, TM's normal, Nose normal, Mouth- no ulcers, no lesions  NECK: no " adenopathy, no asymmetry, no masses,  and thyroid normal to palpation, supple  RESP: lungs clear to auscultation - no rales, no rhonchi, no wheezes  CV: regular rates and rhythm, normal S1 S2, no S3 or S4 and no murmur, no click or rub -  Affect: Appropriate/mood-congruent           Labs:       Assessment and Plan     1. Gender dysphoria in adolescent and adult  Start 2mg PO estradiol daily today and spironolactone 50mg daily today.  Check CMP in 1 month and follow up in 1 month.  Possible increase in dosage of estradiol and/or spironolactone at that time pending electrolyte check.          Contraception:   abstinence  .   Counselled patient about controlled substances: No, no current or past substance use    Follow up:  Follow up in 1 month.  [facility]  Questions were elicited and answered.     Won Ledesma MD

## 2017-10-18 ENCOUNTER — OFFICE VISIT (OUTPATIENT)
Dept: FAMILY MEDICINE | Facility: CLINIC | Age: 27
End: 2017-10-18

## 2017-10-18 VITALS
DIASTOLIC BLOOD PRESSURE: 75 MMHG | BODY MASS INDEX: 16.69 KG/M2 | WEIGHT: 130 LBS | TEMPERATURE: 97.4 F | OXYGEN SATURATION: 96 % | HEART RATE: 69 BPM | SYSTOLIC BLOOD PRESSURE: 117 MMHG | RESPIRATION RATE: 16 BRPM

## 2017-10-18 DIAGNOSIS — F64.0 GENDER DYSPHORIA IN ADULT: ICD-10-CM

## 2017-10-18 DIAGNOSIS — Z23 NEED FOR INFLUENZA VACCINATION: Primary | ICD-10-CM

## 2017-10-18 LAB
ALBUMIN SERPL-MCNC: 4.6 MG/DL (ref 3.8–5)
ALP SERPL-CCNC: 58.6 U/L (ref 31.7–110.7)
ALT SERPL-CCNC: 12.3 U/L (ref 0–45)
AST SERPL-CCNC: 16 U/L (ref 0–55)
BILIRUB SERPL-MCNC: 0.9 MG/DL (ref 0.2–1.3)
BUN SERPL-MCNC: 13.1 MG/DL (ref 7–21)
CALCIUM SERPL-MCNC: 9.9 MG/DL (ref 8.5–10.1)
CHLORIDE SERPLBLD-SCNC: 99.9 MMOL/L (ref 98–110)
CHOLEST SERPL-MCNC: 146.8 MG/DL (ref 0–200)
CHOLEST/HDLC SERPL: 3.2 {RATIO} (ref 0–5)
CO2 SERPL-SCNC: 29.1 MMOL/L (ref 20–32)
CREAT SERPL-MCNC: 0.7 MG/DL (ref 0.7–1.3)
GFR SERPL CREATININE-BSD FRML MDRD: >90 ML/MIN/1.7 M2
GLUCOSE SERPL-MCNC: 100.7 MG'DL (ref 70–99)
HDLC SERPL-MCNC: 45.9 MG/DL
HEMOGLOBIN: 16.1 G/DL (ref 13.3–17.7)
LDLC SERPL CALC-MCNC: 89 MG/DL (ref 0–129)
POTASSIUM SERPL-SCNC: 4.2 MMOL/DL (ref 3.3–4.5)
PROT SERPL-MCNC: 6.9 G/DL (ref 6.8–8.8)
SODIUM SERPL-SCNC: 135 MMOL/L (ref 132.6–141.4)
TRIGL SERPL-MCNC: 58.2 MG/DL (ref 0–150)
VLDL CHOLESTEROL: 11.6 MG/DL (ref 7–32)

## 2017-10-18 RX ORDER — ESTRADIOL 2 MG/1
4 TABLET ORAL DAILY
Qty: 120 TABLET | Refills: 0 | Status: SHIPPED | OUTPATIENT
Start: 2017-10-18 | End: 2017-11-14

## 2017-10-18 RX ORDER — ESTRADIOL 2 MG/1
2 TABLET ORAL DAILY
Qty: 90 TABLET | Refills: 0 | Status: SHIPPED | OUTPATIENT
Start: 2017-10-18 | End: 2017-10-18

## 2017-10-18 RX ORDER — SPIRONOLACTONE 100 MG/1
100 TABLET, FILM COATED ORAL DAILY
Qty: 60 TABLET | Refills: 0 | Status: SHIPPED | OUTPATIENT
Start: 2017-10-18 | End: 2017-11-14

## 2017-10-18 NOTE — MR AVS SNAPSHOT
After Visit Summary   10/18/2017    Carlos Yu    MRN: [id number]           Patient Information     Date Of Birth          1990        Visit Information        Provider Department      10/18/2017 2:20 PM Won Simon MD [facility]        Today's Diagnoses     Need for influenza vaccination    -  1    Gender dysphoria in adult          Care Instructions    Here is the plan from today's visit    1. Need for influenza vaccination  - ADMIN VACCINE, INITIAL  - FLU VAC PRESRV FREE QUAD SPLIT VIR IM, 0.5 mL dosage      2. Gender dysphoria in adult  - estradiol (ESTRACE) 2 MG tablet; Take 2 tablets (4 mg) by mouth daily  Dispense: 120 tablet; Refill: 0  - spironolactone (ALDACTONE) 100 MG tablet; Take 1 tablet (100 mg) by mouth daily  Dispense: 60 tablet; Refill: 0  - Comprehensive Metabolic Panel  - Hemoglobin (HGB)  - Lipid Cascade  - Glucose  - Testosterone Total        Please call or return to clinic if your symptoms don't go away.    Follow up plan  Please make a clinic appointment for follow up with me (WON SIMON) in 1 month    Thank you for coming to [facility] today.  Lab Testing:  **If you had lab testing today and your results are reassuring or normal they will be mailed to you or sent through Pressly within 7 days.   **If the lab tests need quick action we will call you with the results.  The phone number we will call with results is # [phone] (home) . If this is not the best number please call our clinic and change the number.  Medication Refills:  If you need any refills please call your pharmacy and they will contact us.   If you need to  your refill at a new pharmacy, please contact the new pharmacy directly. The new pharmacy will help you get your medications transferred faster.   Scheduling:  If you have any concerns about today's visit or wish to schedule another appointment please call our office during normal business  hours [phone] (8-5:00 M-F)  If a referral was made to a [facility] and you don't get a call from central scheduling please call [phone].  If a Mammogram was ordered for you at [facility] call [phone] to schedule or change your appointment.  If you had an XRay/CT/Ultrasound/MRI ordered the number is [phone] to schedule or change your radiology appointment.   Medical Concerns:  If you have urgent medical concerns please call [phone] at any time of the day.          Some online resources for transgender health    [place] Transgender Health Coalition   Home to the [facility] at [address] [place] [address], [phone]. Also has support groups.  http://www.[place]transhealth.org/    Center of Excellence for Transgender Health  Increasing access to comprehensive, effective, and affirming healthcare services for trans and gender-variant communities.  http://www.transhealth.[facility].edu/trans?page=ajith-00-05    [facility]   Community-based non-profit committed to advancing the health & wellness of LGBTQ communities through research, education and advocacy. http://www.Puzzlium.org    [facility] Glossary of Transgender Terms  http://www.Hmizate.mahealth.org/site/DocServer/Handout_7-C_Glossary_of_Gender_and_Transgender_Terms__fi.pdf?dkvTV=7522    Safe, gender-neutral public restrooms in the [facility]   http://www.[facility].org//index.php?option=com_content&task=view&id=12&Itemid    Trans Youth Support Network  For people 26 and under who identify as a trans or gender non-conforming person and want to be a part of an activist organization. Offers peer support, education and community building opportunities.   http://www.transyouthsupportnetwork.org/    Reclaim:  RECLAIM offers mental and integrative health services for LGBTQ youth and their families.  http://www.reclaim-lgbtyouth.org/    Gender Spectrum, for Trans Youth  Gender Spectrum provides education,  training and support to help create a gender sensitive and inclusive environment for all children and teens. http://www.genderspectrum.org/    [facility] FTM Resource Guide  Information on topics of interest to female-to-male (FTM, F2M) trans men, and their friends and loved ones.  http://ftmguide.org/    Also check out your local [facility] [place] resource center!  LGBTQIA Services at [facility]: http://www.[facility]/lgbtqia/  LGBTQ@[facility] at [facility]: http://www.[facility]/multiculturalAVOS Cloud/lgbtq/  GLBTA Programs office at  of : https://diversity.[facility].edu/glbta/            Thoughts of self harm?   Trans Lifeline can be reached at [phone].   This service is staffed by trans people 24/7.   For LGBT youth (ages 24 and younger) contemplating suicide, the [employer] Lifeline can be reached at [phone].       FEMINIZING Medications, Labs and f/u for Transwomen (MTF)Updated 6/2016  Drug Dose s/p Orchiectomy Typical patient Miscellaneous Details   Estradiol po  (Estrace) Start: 2mg pills daily at same time  Typical: 4-6mg     Max: 8mg Typical 1-2mg daily <39 yo, nonsmoker  good w/ daily meds Erectile dysfunction that is bothersome ? typical ED treatments.    Estradiol patch (Vivelle-Dot) apply 2x per wk  Start:  mcg/24 hr patch,   Typical: 100-200mcg daily  Max: 2 patches 2x/wk (200mcg/d) Typical: 37.5- 100mcg daily, changed 2x/wk Hx VTE, >40, smoker, transaminitis Patch falling off? Clean w/ EtOH first, apply & press x30sec. Baby oil to remove adhesive. Rx for 8 -16 patches/month.   Estradiol valerate (Delestrogen) Start: 5 mg IM weekly  Typical: 5-10 mg IM weekly  Max: 15-20 mg weekly 5mg IM weekly or 10 IM q2wk Ok w/ needles, >40, already on it Needles: 23-25gauge 1inch (if thigh) to 1.5inch (if glut), 1cc syringe, 18ga to draw up. Comes in 5ml bottles.   Estradiol cream  compounded Start: 50-100mcg to thighs daily  Typical: 100-200mcg   Max: 200 37.5-100mcg daily Ok w/ topical, paying out of pocket  Pharm: [facility], [facility] [address] [facility] online, in PLO gel   Conj. Estrogens (premarin)  Typical: 5mg daily. Avoid if possible due to higher risk VTE Get IMAN & ensure informed of risk Consult local expert before long term continuation.    Spironolactone Start: 50-100mg po daily  Max: 200 (MOST)-300mg  DC Bothersome hair, acne or erections AntiAndrogen. Check bmp when on max dose. Split dose if dizzy.   Finasteride Start: 2.5mg po daily  Max: 5-10mg daily DC unless for alopecia Bothersome hair, alopecia, or erections AntiAndrogen - ok to use in combo w/ casper if poor feminizing effect   ?Progesterone? RARELY USE 200mg prometrium HS or q3mo depo shots IF ASKED ONLY DC Wants breast dev, no depression/obesity Causes wt gain and a lot of depression. Data inconclusive on breast developmt.     LABS Hgb Fasting Glu Fasting Lipid Total testosterone LFT s Prolactin Estrogen Ultrasens.   On shots? Draw labs  Midcycle! ( - way btw shots) -Baseline  -3 mo after   -6mo after  -then annual  After=after start or dose change Or A1c  -Baseline  -3 mo after   -6mo after  -then annual -Baseline  -3 mo after   -6mo after  -then annual -Baseline  -3 mo after   -6mo after  -q1yr.   Goal level <50 -Baseline  -3 mo after   -6mo after  -q1yr. Abnl?RUQ US, hep serology -if Sx only, rare.  If >100 needs f/u, MRI Check ONLY if on injectable or not clinically feminizing. Level should be 100-200.  >200 ?VTE risk and dose must be ?immediately, f/u in 1 month   On casper -Baseline BUN/Cr/lytes, Again when MAX dose  -annually thereafter, careful with ACEi s Follow up lab abnormalities w/repeat labs  UDon t panic ?may need small adjustment in dose.      -PPsychotherapy: not required! REC if: no consolidated gender, not gender dysphoric by criteria, nonconforming & may not need hormones  -E-Estrogen therapy is CONTRAINDICATED IF estrogen dependent cancer  -G-Gender related effects: Breast development, redistribution of body fat,  softening of skin, ?mood, testes shrink, ?libido and ? fertility  -O-Other effects: ?VTE risk (steven if >40, smoker, obese, hx clots, po meds), ?Weight, emotional lability, ?lipids, ?erections, ?gallstones  **Upcoming surgery? HOLD ESTROGEN for 2-4 weeks prior to surgical procedure Adapted by Nasra Johns MD from:  Dept Public Health, [facility] Trans* Center of Excellence, [facility] Guidelines, WPATH SOC-7.  Updated 6/2016  Trans Guidelines [facility]     MASCULINIZING Medications, Labs and f/u for Transmen (FTM)Updated 6/2016  Drug Dose s/p oophorectomy  Miscellaneous Details   Testosterone cypionate   (Depo Testosterone)  Start: 25-50mg IM qwk  Typical: 50-75mg IM qwk  Max: 100mg IM qwk Typical: 30-60mg IM qwk Needles: 23-25ga 1inch (for thigh) to 1.5inch (for glut), 1cc syringe, 18ga to draw up. Bottle sizes 1ml or 10ml. Allergy? Carrier oil is cottonseed oil   Testosterone enanthate   (Delatestryl) Start: 25-50mg IM qwk  Typical: 60-80mg IM qwk  Max: 100mg IM qwk Typical: 40-80mg IM qwk Allergy? Carrier oil is sesame oil.    Testosterone gel (Androgel 1.62%) Start: 40.25mg daily (2 pumps)  Typical: 40-80mg (2-4pumps)  Max: 6 pumps 1-2 pumps daily  4 hrs to dry, apply to shoulders & upper arms, behind knees thighs, armpits, can txfr to others.   40.25mg testosterone = 2.5g gel= 2 pumps   Testosterone gel  (Testim 1%) Start: 2.5 g daily  Typical: 5mg daily  Max: 10 g daily  4 hrs to dry, apply to shoulders & upper arms, can txfr to others. 50mg testosterone= 5 grams gel. Comes in premeasured tubes.   Testosterone Soln (Axiron)   Start: 60mg daily (2 pumps)  Typical: 60-120mg (2-4p) Max: 120 mg (4 p) 1-2 pumps daily 30mg testosterone = 1.5 ml solution = 1 pump  2 pumps = 1 pump each axilla   Androderm patch  Typical: 5-10m/24hr patch 2.5mg patch Disliked due to very large size, consider switch if possible   Testosterone proprionate 1-2% cream compounded Start: 25mg daily   Typical: 50-75mg daily  Max: 100mg  daily 25-50mg daily Compounded.  Recommend [facility] pharmacy, [facility] [address] (monthly cost ~$40) or [facility] [address]. Compound in PLO gel, or ask for recs if not avail.     LABS Hgb Fasting Glu/A1C Fasting Lipid LFT s Total testosterone   On shots? Draw labs Mid  cycle! (  way btw shots) After=after start or dose change -Baseline  -3 mo after   -6mo after  -then annual   -Baseline  -3 mo after   -6mo after  -then annual -Baseline  -3 mo after   -6mo after  -then annual  Statins prn. -Baseline  -3 mo after   -6mo after  -q1yr Abnl? Hep serology, RUQ US -Baseline  -3 mo after   -6mo after, then annual.  Goal range 300-1000,  push ? to induce changes, drop after 2yrs   -    -0Psychotherapy: not required! REC if: no consolidated gender, not gender dysphoric by criteria, nonconforming & may not need hormones      -Testosterone therapy is CONTRAINDICATED IF unstable psychosis, bipolar, or PREGNANCY. Need excellent contraception if sperm!  -   Gender related effects: stop menses, ?voice, ?facial/body hair, ?muscle mass, redistrib body fat, acne, ?libido, ?fertility, ?clitoris  -   Other effects: ?Weight, emotional lability, ?lipids, vaginal atrophy, ? BP, ?RBC, ?LFT s transiently, ?LAMBERT, ?endometrial hyperplasia steven  since higher incidence of PCOS in transmen    -surveillance of periods is critical for long term T  -   Testosterone is a controlled substance. Educate about this. Follow for reasonable refills. Paper rx required.    Adapted by Nasra Johns MD from: [place] Dept Public Health, [facility] Trans* Center of Excellence, [facility] Guidelines, WPATH SOC-7. Updated 6/2016 Trans Guidelines [facility]     Effects and Expected Time Course of Feminizing Hormones    Effect Expected Onset Expected Maximum Effect   Body Fat redistribution 3-6 months 2-5 years   Decreased Muscle mass 3-6 months 1-2 years   Softening of skin/decreased oiliness 3-6 months Unknown   Decreased Libido 1-3 months 1-2 years   Decreased  Spontaneous Erections 1-3 months 3-6 months   Male Sexual Dysfunction Variable Variable   Breast Growth 3-6 months 2-3 years   Decreased Testicular volume 3-6 months 2-3 years   Decreased sperm production Variable Variable   Thinning and slowed growth of body and facial hair  + 6-12 months >3 years   Male pattern baldness No regrowth, loss stops 1-3 months 1-2 years   +complete removal of male facial hair and body hair requires electrolysis, laser treatment or both      Effects and Expected Time Course of Masculinizing  Hormones    Effect Expected Onset Expected Maximum Effect   Skin oiliness/Acne 1-6 months 1-2 years   Facial/body hair growth 3-6 months 3-5 years    Scalp hair loss >12 months+ Variable   Increased muscle mass/strength 6-12 months 2-5 years   Body fat redistribution 3-6 months 2-5 years   Cessation of menses 2-6 months n/a   Clitoral enlargement 3-6 months 1-2 years   Vaginal atrophy 3-6 months 1-2 years   Deepened voice 3-12 months 1-2 years                   Follow-ups after your visit        Who to contact     Please call your clinic at [phone] to:    Ask questions about your health    Make or cancel appointments    Discuss your medicines    Learn about your test results    Speak to your doctor   If you have compliments or concerns about an experience at your clinic, or if you wish to file a complaint, please contact [facility] Patient Relations at [phone] or email us at [email or web address]         Additional Information About Your Visit        IMImobilehart Information     Cardica gives you secure access to your electronic health record. If you see a primary care provider, you can also send messages to your care team and make appointments. If you have questions, please call your primary care clinic.  If you do not have a primary care provider, please call [phone] and they will assist you.      Cardica is an electronic gateway that provides easy,  online access to your medical records. With MobileOCT, you can request a clinic appointment, read your test results, renew a prescription or communicate with your care team.     To access your existing account, please contact your [facility] or call [phone] for assistance.        Care EveryWhere ID     This is your Care EveryWhere ID. This could be used by other organizations to access your [place] medical records  [id number]        Your Vitals Were     Pulse Temperature Respirations Pulse Oximetry BMI (Body Mass Index)       69 97.4  F (36.3  C) (Oral) 16 96% 16.69 kg/m2        Blood Pressure from Last 3 Encounters:   10/18/17 117/75   09/14/17 121/67   08/16/17 136/84    Weight from Last 3 Encounters:   10/18/17 130 lb (59 kg)   09/14/17 129 lb (58.5 kg)   08/16/17 126 lb 6.4 oz (57.3 kg)              We Performed the Following     ADMIN VACCINE, INITIAL     Comprehensive Metabolic Panel     FLU VAC PRESRV FREE QUAD SPLIT VIR IM, 0.5 mL dosage     Glucose     Hemoglobin (HGB)     Lipid Cascade     Testosterone Total          Today's Medication Changes          These changes are accurate as of: 10/18/17  2:58 PM.  If you have any questions, ask your nurse or doctor.               These medicines have changed or have updated prescriptions.        Dose/Directions    * estradiol 2 MG tablet   Commonly known as:  ESTRACE   This may have changed:  Another medication with the same name was added. Make sure you understand how and when to take each.   Used for:  Gender dysphoria in adolescent and adult   Changed by:  Won Ledesma MD        Dose:  2 mg   Take 1 tablet (2 mg) by mouth daily   Quantity:  30 tablet   Refills:  2       * estradiol 2 MG tablet   Commonly known as:  ESTRACE   This may have changed:  You were already taking a medication with the same name, and this prescription was added. Make sure you understand how and when to take each.   Used for:  Gender  dysphoria in adult   Changed by:  Won Ledesma MD        Dose:  4 mg   Take 2 tablets (4 mg) by mouth daily   Quantity:  120 tablet   Refills:  0       * spironolactone 100 MG tablet   Commonly known as:  ALDACTONE   This may have changed:  Another medication with the same name was added. Make sure you understand how and when to take each.   Used for:  Gender dysphoria in adolescent and adult   Changed by:  Won Ledesma MD        Dose:  100 mg   Take 1 tablet (100 mg) by mouth daily   Quantity:  60 tablet   Refills:  3       * spironolactone 50 MG tablet   Commonly known as:  ALDACTONE   This may have changed:  Another medication with the same name was added. Make sure you understand how and when to take each.   Used for:  Gender dysphoria in adolescent and adult   Changed by:  Won Ledesma MD        Dose:  50 mg   Take 1 tablet (50 mg) by mouth daily   Quantity:  60 tablet   Refills:  1       * spironolactone 100 MG tablet   Commonly known as:  ALDACTONE   This may have changed:  You were already taking a medication with the same name, and this prescription was added. Make sure you understand how and when to take each.   Used for:  Need for influenza vaccination   Changed by:  Won Ledesma MD        Dose:  100 mg   Take 1 tablet (100 mg) by mouth daily   Quantity:  60 tablet   Refills:  0       * Notice:  This list has 5 medication(s) that are the same as other medications prescribed for you. Read the directions carefully, and ask your doctor or other care provider to review them with you.         Where to get your medicines      These medications were sent to [facility] [place], [place] - [address] [facility] [address]     Phone:  [phone]     estradiol 2 MG tablet    spironolactone 100 MG tablet                Primary Care Provider Office Phone # Fax #    Won Ledesma [phone]       [facility] [address] [address]        Equal Access to Services     ALIA SHEARER : Hadii blayne Mckenzie, dave pearson, papa landlorinpaige valerio. So [facility] [phone].    ATENCIÓN: Si habla español, tiene a delgado disposición servicios gratuitos de asistencia lingüística. Llame al [phone].    We comply with applicable federal civil rights laws and [place] laws. We do not discriminate on the basis of race, color, national origin, age, disability, sex, sexual orientation, or gender identity.            Thank you!     [facility] you [facility]  [facility] your care. Our goal is always to provide you with excellent care. Hearing back from our patients is one way we can continue to improve our services. Please take a few minutes to complete the written survey that you may receive in the mail after your visit with us. Thank you!             Your Updated Medication List - Protect others around you: Learn how to safely use, store and throw away your medicines at www.disposemymeds.org.          This list is accurate as of: 10/18/17  2:58 PM.  Always use your most recent med list.                   Brand Name Dispense Instructions for use Diagnosis    * estradiol 2 MG tablet    ESTRACE    30 tablet    Take 1 tablet (2 mg) by mouth daily    Gender dysphoria in adolescent and adult       * estradiol 2 MG tablet    ESTRACE    120 tablet    Take 2 tablets (4 mg) by mouth daily    Gender dysphoria in adult       * spironolactone 100 MG tablet    ALDACTONE    60 tablet    Take 1 tablet (100 mg) by mouth daily    Gender dysphoria in adolescent and adult       * spironolactone 50 MG tablet    ALDACTONE    60 tablet    Take 1 tablet (50 mg) by mouth daily    Gender dysphoria in adolescent and adult       * spironolactone 100 MG tablet    ALDACTONE    60 tablet    Take 1 tablet (100 mg) by mouth daily    Need for influenza  vaccination       * Notice:  This list has 5 medication(s) that are the same as other medications prescribed for you. Read the directions carefully, and ask your doctor or other care provider to review them with you.

## 2017-10-18 NOTE — PATIENT INSTRUCTIONS
Here is the plan from today's visit    1. Need for influenza vaccination  - ADMIN VACCINE, INITIAL  - FLU VAC PRESRV FREE QUAD SPLIT VIR IM, 0.5 mL dosage      2. Gender dysphoria in adult  - estradiol (ESTRACE) 2 MG tablet; Take 2 tablets (4 mg) by mouth daily  Dispense: 120 tablet; Refill: 0  - spironolactone (ALDACTONE) 100 MG tablet; Take 1 tablet (100 mg) by mouth daily  Dispense: 60 tablet; Refill: 0  - Comprehensive Metabolic Panel  - Hemoglobin (HGB)  - Lipid Cascade  - Glucose  - Testosterone Total        Please call or return to clinic if your symptoms don't go away.    Follow up plan  Please make a clinic appointment for follow up with me (BARB SIMON) in 1 month    Thank you for coming to [facility] today.  Lab Testing:  **If you had lab testing today and your results are reassuring or normal they will be mailed to you or sent through [employer] within 7 days.   **If the lab tests need quick action we will call you with the results.  The phone number we will call with results is # [phone] (home) . If this is not the best number please call our clinic and change the number.  Medication Refills:  If you need any refills please call your pharmacy and they will contact us.   If you need to  your refill at a new pharmacy, please contact the new pharmacy directly. The new pharmacy will help you get your medications transferred faster.   Scheduling:  If you have any concerns about today's visit or wish to schedule another appointment please call our office during normal business hours [phone] (8-5:00 M-F)  If a referral was made to a [facility] and you don't get a call from central scheduling please call [phone].  If a Mammogram was ordered for you at [facility] call [phone] to schedule or change your appointment.  If you had an XRay/CT/Ultrasound/MRI ordered the number is [phone] to schedule or change your radiology appointment.    Medical Concerns:  If you have urgent medical concerns please call [phone] at any time of the day.          Some online resources for transgender health    [place] [facility]   Home to [facility] at [address], [phone]. Also has support groups.  http://www.mntranshealth.org/    [facility] Excellence for Transgender Health  Increasing access to comprehensive, effective, and affirming healthcare services for trans and gender-variant communities.  http://www.transhealth.[facility].edu/trans?page=ajith-00-05    [facility]   Community-based non-profit committed to advancing the health & wellness of LGBTQ communities through research, education and advocacy. http://www.[employer].org    [facility] Glossary of Transgender Terms  http://www.[employer].org/site/DocServer/Handout_7-C_Glossary_of_Gender_and_Transgender_Terms__fi.pdf?rshBV=3602    Safe, gender-neutral public restrooms in [facility]   http://www.[facility]trans[facility].org//index.php?option=com_content&task=view&id=12&Itemid    Trans Youth Support Network  For people 26 and under who identify as a trans or gender non-conforming person and want to be a part of an activist organization. Offers peer support, education and community building opportunities.   http://www.transyouthsupportnetwork.org/    Reclaim:  RECLAIM offers mental and integrative health services for LGBTQ youth and their families.  http://www.reclaim-lgbtyouth.org/    Gender Spectrum, for Trans Youth  Gender Spectrum provides education, training and support to help create a gender sensitive and inclusive environment for all children and teens. http://www.genderspectrum.org/    [employer] FTM Resource Guide  Information on topics of interest to female-to-male (FTM, F2M) trans men, and their friends and loved ones.  http://ftmguide.org/    Also check out your local [employer] queer resource center!  LGBTQIA Services at [facility]:  http://www.[facility]/lgbtqia/  LGBTQ@Mac at [facility]: http://www.[facility]/multiculturallife/lgbtq/  GLBTA Programs office at [facility]: https://diversity.[facility].edu/glbta/            Thoughts of self harm?   Trans Lifeline can be reached at [phone].   This service is staffed by trans people 24/7.   For LGBT youth (ages 24 and younger) contemplating suicide, the [employer] Lifeline can be reached at [phone].       FEMINIZING Medications, Labs and f/u for Transwomen (MTF)Updated 6/2016  Drug Dose s/p Orchiectomy Typical patient Miscellaneous Details   Estradiol po  (Estrace) Start: 2mg pills daily at same time  Typical: 4-6mg     Max: 8mg Typical 1-2mg daily <39 yo, nonsmoker  good w/ daily meds Erectile dysfunction that is bothersome ? typical ED treatments.    Estradiol patch (Vivelle-Dot) apply 2x per wk  Start:  mcg/24 hr patch,   Typical: 100-200mcg daily  Max: 2 patches 2x/wk (200mcg/d) Typical: 37.5- 100mcg daily, changed 2x/wk Hx VTE, >40, smoker, transaminitis Patch falling off? Clean w/ EtOH first, apply & press x30sec. Baby oil to remove adhesive. Rx for 8 -16 patches/month.   Estradiol valerate (Delestrogen) Start: 5 mg IM weekly  Typical: 5-10 mg IM weekly  Max: 15-20 mg weekly 5mg IM weekly or 10 IM q2wk Ok w/ needles, >40, already on it Needles: 23-25gauge 1inch (if thigh) to 1.5inch (if glut), 1cc syringe, 18ga to draw up. Comes in 5ml bottles.   Estradiol cream  compounded Start: 50-100mcg to thighs daily  Typical: 100-200mcg   Max: 200 37.5-100mcg daily Ok w/ topical, paying out of pocket [facility] [address], [facility] [address] online, in PLO gel   Conj. Estrogens (premarin)  Typical: 5mg daily. Avoid if possible due to higher risk VTE Get IMAN & ensure informed of risk Consult local expert before long term continuation.    Spironolactone Start: 50-100mg po daily  Max: 200 (MOST)-300mg  DC Bothersome hair, acne or erections AntiAndrogen. Check bmp when on max  dose. Split dose if dizzy.   Finasteride Start: 2.5mg po daily  Max: 5-10mg daily DC unless for alopecia Bothersome hair, alopecia, or erections AntiAndrogen - ok to use in combo w/ casper if poor feminizing effect   ?Progesterone? RARELY USE 200mg prometrium HS or q3mo depo shots IF ASKED ONLY DC Wants breast dev, no depression/obesity Causes wt gain and a lot of depression. Data inconclusive on breast developmt.     LABS Hgb Fasting Glu Fasting Lipid Total testosterone LFT s Prolactin Estrogen Ultrasens.   On shots? Draw labs  Midcycle! ( - way btw shots) -Baseline  -3 mo after   -6mo after  -then annual  After=after start or dose change Or A1c  -Baseline  -3 mo after   -6mo after  -then annual -Baseline  -3 mo after   -6mo after  -then annual -Baseline  -3 mo after   -6mo after  -q1yr.   Goal level <50 -Baseline  -3 mo after   -6mo after  -q1yr. Abnl?RUQ US, hep serology -if Sx only, rare.  If >100 needs f/u, MRI Check ONLY if on injectable or not clinically feminizing. Level should be 100-200.  >200 ?VTE risk and dose must be ?immediately, f/u in 1 month   On casper -Baseline BUN/Cr/lytes, Again when MAX dose  -annually thereafter, careful with ACEi s Follow up lab abnormalities w/repeat labs  UDon t panic ?may need small adjustment in dose.      -PPsychotherapy: not required! REC if: no consolidated gender, not gender dysphoric by criteria, nonconforming & may not need hormones  -E-Estrogen therapy is CONTRAINDICATED IF estrogen dependent cancer  -G-Gender related effects: Breast development, redistribution of body fat, softening of skin, ?mood, testes shrink, ?libido and ? fertility  -O-Other effects: ?VTE risk (steven if >40, smoker, obese, hx clots, po meds), ?Weight, emotional lability, ?lipids, ?erections, ?gallstones  **Upcoming surgery? HOLD ESTROGEN for 2-4 weeks prior to surgical procedure Adapted by Nasra Johns MD from:  Dept Public Health, [facility] Trans* Center of Excellence, [facility]  Guidelines, WPATH SOC-7.  Updated 6/2016  Trans Guidelines [facility]     MASCULINIZING Medications, Labs and f/u for Transmen (FTM)Updated 6/2016  Drug Dose s/p oophorectomy  Miscellaneous Details   Testosterone cypionate   (Depo Testosterone)  Start: 25-50mg IM qwk  Typical: 50-75mg IM qwk  Max: 100mg IM qwk Typical: 30-60mg IM qwk Needles: 23-25ga 1inch (for thigh) to 1.5inch (for glut), 1cc syringe, 18ga to draw up. Bottle sizes 1ml or 10ml. Allergy? Carrier oil is cottonseed oil   Testosterone enanthate   (Delatestryl) Start: 25-50mg IM qwk  Typical: 60-80mg IM qwk  Max: 100mg IM qwk Typical: 40-80mg IM qwk Allergy? Carrier oil is sesame oil.    Testosterone gel (Androgel 1.62%) Start: 40.25mg daily (2 pumps)  Typical: 40-80mg (2-4pumps)  Max: 6 pumps 1-2 pumps daily  4 hrs to dry, apply to shoulders & upper arms, behind knees thighs, armpits, can txfr to others.   40.25mg testosterone = 2.5g gel= 2 pumps   Testosterone gel  (Testim 1%) Start: 2.5 g daily  Typical: 5mg daily  Max: 10 g daily  4 hrs to dry, apply to shoulders & upper arms, can txfr to others. 50mg testosterone= 5 grams gel. Comes in premeasured tubes.   Testosterone Soln (Axiron)   Start: 60mg daily (2 pumps)  Typical: 60-120mg (2-4p) Max: 120 mg (4 p) 1-2 pumps daily 30mg testosterone = 1.5 ml solution = 1 pump  2 pumps = 1 pump each axilla   Androderm patch  Typical: 5-10m/24hr patch 2.5mg patch Disliked due to very large size, consider switch if possible   Testosterone proprionate 1-2% cream compounded Start: 25mg daily   Typical: 50-75mg daily  Max: 100mg daily 25-50mg daily Compounded.  Recommend [facility] online pharmacy,  on [address] (monthly cost ~$40) or [facility] [address]. Compound in PLO gel, or ask for recs if not avail.     LABS Hgb Fasting Glu/A1C Fasting Lipid LFT s Total testosterone   On shots? Draw labs Mid  cycle! (  way btw shots) After=after start or dose change -Baseline  -3 mo after   -6mo after  -then annual    -Baseline  -3 mo after   -6mo after  -then annual -Baseline  -3 mo after   -6mo after  -then annual  Statins prn. -Baseline  -3 mo after   -6mo after  -q1yr Abnl? Hep serology, RUQ US -Baseline  -3 mo after   -6mo after, then annual.  Goal range 300-1000,  push ? to induce changes, drop after 2yrs   -    -0Psychotherapy: not required! REC if: no consolidated gender, not gender dysphoric by criteria, nonconforming & may not need hormones      -Testosterone therapy is CONTRAINDICATED IF unstable psychosis, bipolar, or PREGNANCY. Need excellent contraception if sperm!  -   Gender related effects: stop menses, ?voice, ?facial/body hair, ?muscle mass, redistrib body fat, acne, ?libido, ?fertility, ?clitoris  -   Other effects: ?Weight, emotional lability, ?lipids, vaginal atrophy, ? BP, ?RBC, ?LFT s transiently, ?LAMBERT, ?endometrial hyperplasia steven  since higher incidence of PCOS in transmen    -surveillance of periods is critical for long term T  -   Testosterone is a controlled substance. Educate about this. Follow for reasonable refills. Paper rx required.    Adapted by Nasra Johns MD from: [place] Dept Public Health, [facility] Trans* Center of Excellence, [facility] Guidelines, WPATH SOC-7. Updated 6/2016 Trans Guidelines [facility]     Effects and Expected Time Course of Feminizing Hormones    Effect Expected Onset Expected Maximum Effect   Body Fat redistribution 3-6 months 2-5 years   Decreased Muscle mass 3-6 months 1-2 years   Softening of skin/decreased oiliness 3-6 months Unknown   Decreased Libido 1-3 months 1-2 years   Decreased Spontaneous Erections 1-3 months 3-6 months   Male Sexual Dysfunction Variable Variable   Breast Growth 3-6 months 2-3 years   Decreased Testicular volume 3-6 months 2-3 years   Decreased sperm production Variable Variable   Thinning and slowed growth of body and facial hair  + 6-12 months >3 years   Male pattern baldness No regrowth, loss stops 1-3 months 1-2 years   +complete removal  of male facial hair and body hair requires electrolysis, laser treatment or both      Effects and Expected Time Course of Masculinizing  Hormones    Effect Expected Onset Expected Maximum Effect   Skin oiliness/Acne 1-6 months 1-2 years   Facial/body hair growth 3-6 months 3-5 years    Scalp hair loss >12 months+ Variable   Increased muscle mass/strength 6-12 months 2-5 years   Body fat redistribution 3-6 months 2-5 years   Cessation of menses 2-6 months n/a   Clitoral enlargement 3-6 months 1-2 years   Vaginal atrophy 3-6 months 1-2 years   Deepened voice 3-12 months 1-2 years

## 2017-10-18 NOTE — NURSING NOTE
"Injectable Influenza Immunization Documentation    1.  Has the patient received the information for the injectable influenza vaccine? YES     2. Is the patient 6 months of age or older? YES     3. Does the patient have any of the following contraindications?         Severe allergy to eggs? No     Severe allergic reaction to previous influenza vaccines? No   Severe allergy to latex? No       History of Guillain-[place] syndrome? No     Currently have a temperature greater than 100.4F? No        4.  Severely egg allergic patients should have flu vaccine eligibility assessed by an MD, RN, or pharmacist, and those who received flu vaccine should be observed for 15 min by an MD, RN, Pharmacist, Medical Technician, or member of clinic staff.\": YES    5. Latex-allergic patients should be given latex-free influenza vaccine Yes. Please reference the Vaccine latex table to determine if your clinic s product is latex-containing.       Vaccination given by SMA Sharda        "

## 2017-10-18 NOTE — PROGRESS NOTES
"          LENARD Gonzalez is a 27 year old individual that uses pronouns She/Her/Hers/Herself that presents today for follow up of:  feminizing hormone therapy. Gender identity: Female    Any special concerns today?  no current concerns    On hormones?       Due for labs?  Yes      +++ Refills of meds needed?  Yes  ---    Past Surgical History:   Procedure Laterality Date     NO HISTORY OF SURGERY         Patient Active Problem List   Diagnosis     Gender dysphoria in adolescent and adult     Mild intermittent asthma       Current Outpatient Prescriptions   Medication Sig Dispense Refill     estradiol (ESTRACE) 2 MG tablet Take 1 tablet (2 mg) by mouth daily 30 tablet 2     spironolactone (ALDACTONE) 50 MG tablet Take 1 tablet (50 mg) by mouth daily 60 tablet 1     spironolactone (ALDACTONE) 100 MG tablet Take 1 tablet (100 mg) by mouth daily (Patient not taking: Reported on 10/18/2017) 60 tablet 3       History   Smoking Status     Never Smoker   Smokeless Tobacco     Never Used        No Known Allergies    Problem, Medication and Allergy Lists were reviewed and updated if needed..         Review of Systems:      General    Fat redistribution: no    Weight change: no HEENT    Voice change: no     Cardiovascular (CV)    Chest Pains: no    Shortness of breath: no Chest    Decreased exercise tolerance:  no    Breast changes/development: no     Gastrointestinal (GI)    Abdominal pain: no    Change in appetite: no Skin    Acne or oily skin: no    Change in hair: no     Genitourinary ()    Abnormal vaginal bleeding: no     Decreased spontaneous erections: no    Change in libido: no    New sexual partners: no Musculoskeletal    Leg pain or swelling: no     Psychiatric (Psych)    Depression: no    Anxiety/Panic: no    Mood:  \"good\"                    Physical Exam:     Vitals:    10/18/17 1405   BP: 117/75   Pulse: 69   Resp: 16   Temp: 97.4  F (36.3  C)   TempSrc: Oral   SpO2: 96%   Weight: 130 lb (59 kg)     BMI= Body " mass index is 16.69 kg/(m^2).   Wt Readings from Last 10 Encounters:   10/18/17 130 lb (59 kg)   09/14/17 129 lb (58.5 kg)   08/16/17 126 lb 6.4 oz (57.3 kg)   07/21/17 125 lb (56.7 kg)     Appearance: male appearance and non-specific gender dress    GENERAL:: healthy, alert and no distress  RESP: lungs clear to auscultation - no rales, no rhonchi, no wheezes  CV: regular rates and rhythm, normal S1 S2, no S3 or S4 and no murmur, no click or rub -  MS: extremities normal- no gross deformities noted, no edema  Affect: Appropriate/mood-congruent           Labs:       Assessment and Plan     1. Need for influenza vaccination  - ADMIN VACCINE, INITIAL  - FLU VAC PRESRV FREE QUAD SPLIT VIR IM, 0.5 mL dosage    2. Gender Dysphoria  Patient is tolerating newly started hormones well.  Will check labs and increase estradiol from 2mg to 4mg daily and increase spironolactone from 50 to 100 mg daily.  If labs are concerning, will contact patient.  Will plan to increase in 1 month with repeat labs at that time if no adverse effects from medications.          Contraception:   abstinence  .   Counselled patient about controlled substances: No    Follow up:  Follow up in 1 month.  Results by [facility]  Questions were elicited and answered.     Won Ledesma MD

## 2017-10-18 NOTE — PROGRESS NOTES
Preceptor Attestation:   Patient seen and discussed with the resident. Assessment and plan reviewed with resident and agreed upon.   Supervising Physician:  Bassam Longoria MD  [facility]

## 2017-10-19 ASSESSMENT — ASTHMA QUESTIONNAIRES: ACT_TOTALSCORE: 25

## 2017-10-20 LAB — TESTOST SERPL-MCNC: 712 NG/DL (ref 240–950)

## 2017-11-14 ENCOUNTER — OFFICE VISIT (OUTPATIENT)
Dept: FAMILY MEDICINE | Facility: CLINIC | Age: 27
End: 2017-11-14

## 2017-11-14 VITALS
WEIGHT: 130 LBS | SYSTOLIC BLOOD PRESSURE: 121 MMHG | BODY MASS INDEX: 16.69 KG/M2 | TEMPERATURE: 98.1 F | DIASTOLIC BLOOD PRESSURE: 73 MMHG | RESPIRATION RATE: 18 BRPM | OXYGEN SATURATION: 95 % | HEART RATE: 78 BPM

## 2017-11-14 DIAGNOSIS — F64.0 GENDER DYSPHORIA IN ADULT: ICD-10-CM

## 2017-11-14 DIAGNOSIS — F64.0 GENDER DYSPHORIA IN ADOLESCENT AND ADULT: Primary | ICD-10-CM

## 2017-11-14 LAB
ALBUMIN SERPL-MCNC: 4.5 MG/DL (ref 3.8–5)
ALP SERPL-CCNC: 60.5 U/L (ref 31.7–110.7)
ALT SERPL-CCNC: 11.9 U/L (ref 0–45)
AST SERPL-CCNC: 16.5 U/L (ref 0–55)
BILIRUB SERPL-MCNC: 1.1 MG/DL (ref 0.2–1.3)
BUN SERPL-MCNC: 9.2 MG/DL (ref 7–21)
CALCIUM SERPL-MCNC: 9.5 MG/DL (ref 8.5–10.1)
CHLORIDE SERPLBLD-SCNC: 99.5 MMOL/L (ref 98–110)
CHOLEST SERPL-MCNC: 135 MG/DL (ref 0–200)
CHOLEST/HDLC SERPL: 3.1 {RATIO} (ref 0–5)
CO2 SERPL-SCNC: 28.5 MMOL/L (ref 20–32)
CREAT SERPL-MCNC: 0.6 MG/DL (ref 0.7–1.3)
GFR SERPL CREATININE-BSD FRML MDRD: >90 ML/MIN/1.7 M2
GLUCOSE SERPL-MCNC: 87 MG'DL (ref 70–99)
GLUCOSE SERPL-MCNC: 92.5 MG'DL (ref 70–99)
HDLC SERPL-MCNC: 43.3 MG/DL
HEMOGLOBIN: 16 G/DL (ref 13.3–17.7)
LDLC SERPL CALC-MCNC: 76 MG/DL (ref 0–129)
POTASSIUM SERPL-SCNC: 3.8 MMOL/DL (ref 3.3–4.5)
PROT SERPL-MCNC: 7.6 G/DL (ref 6.8–8.8)
SODIUM SERPL-SCNC: 136.8 MMOL/L (ref 132.6–141.4)
TRIGL SERPL-MCNC: 80.2 MG/DL (ref 0–150)
VLDL CHOLESTEROL: 16 MG/DL (ref 7–32)

## 2017-11-14 RX ORDER — SPIRONOLACTONE 100 MG/1
100 TABLET, FILM COATED ORAL DAILY
Qty: 60 TABLET | Refills: 3 | Status: SHIPPED | OUTPATIENT
Start: 2017-11-14 | End: 2017-12-11

## 2017-11-14 RX ORDER — ESTRADIOL 2 MG/1
6 TABLET ORAL DAILY
Qty: 120 TABLET | Refills: 0 | Status: SHIPPED | OUTPATIENT
Start: 2017-11-14 | End: 2017-12-11

## 2017-11-14 NOTE — PROGRESS NOTES
LENARD Gonzalez is a 27 year old individual that uses pronouns She/Her/Hers/Herself that presents today for follow up of:  feminizing hormone therapy. Gender identity: Female    Any special concerns today?  Wondering if finasteride would be beneficial    On hormones?  YES     Due for labs?  Yes      +++ Refills of meds needed?  Yes  ---    Past Surgical History:   Procedure Laterality Date     NO HISTORY OF SURGERY         Patient Active Problem List   Diagnosis     Gender dysphoria in adolescent and adult     Mild intermittent asthma       Current Outpatient Prescriptions   Medication Sig Dispense Refill     spironolactone (ALDACTONE) 100 MG tablet Take 1 tablet (100 mg) by mouth daily 60 tablet 0     estradiol (ESTRACE) 2 MG tablet Take 2 tablets (4 mg) by mouth daily 120 tablet 0     estradiol (ESTRACE) 2 MG tablet Take 1 tablet (2 mg) by mouth daily (Patient not taking: Reported on 11/14/2017) 30 tablet 2     spironolactone (ALDACTONE) 100 MG tablet Take 1 tablet (100 mg) by mouth daily (Patient not taking: Reported on 10/18/2017) 60 tablet 3     spironolactone (ALDACTONE) 50 MG tablet Take 1 tablet (50 mg) by mouth daily (Patient not taking: Reported on 11/14/2017) 60 tablet 1       History   Smoking Status     Never Smoker   Smokeless Tobacco     Never Used        No Known Allergies    Problem, Medication and Allergy Lists were reviewed and updated if needed..         Review of Systems:       General    Fat redistribution: no    Weight change: no HEENT    Voice change: no     Cardiovascular (CV)    Chest Pains: no    Shortness of breath: no Chest    Decreased exercise tolerance:  no    Breast changes/development: no     Gastrointestinal (GI)    Abdominal pain: no    Change in appetite: no Skin    Acne or oily skin: no    Change in hair: no     Genitourinary ()    Abnormal vaginal bleeding: not applicable   Decreased spontaneous erections: YES, not problematic        Change in libido: Hard to  "say    New sexual partners: no Musculoskeletal    Leg pain or swelling: no     Psychiatric (Psych)    Depression: no    Anxiety/Panic: no    Mood:  \"good\" POSITIVE FOR: Tender nipples                   Physical Exam:     Vitals:    11/14/17 1306   BP: 121/73   Pulse: 78   Resp: 18   Temp: 98.1  F (36.7  C)   TempSrc: Oral   SpO2: 95%   Weight: 130 lb (59 kg)     BMI= Body mass index is 16.69 kg/(m^2).   Wt Readings from Last 10 Encounters:   11/14/17 130 lb (59 kg)   10/18/17 130 lb (59 kg)   09/14/17 129 lb (58.5 kg)   08/16/17 126 lb 6.4 oz (57.3 kg)   07/21/17 125 lb (56.7 kg)     Appearance: Both male and female appearance and dress    GENERAL:: healthy, alert and no distress  EYES: Eyes grossly normal to inspection, fundi benign and PERRL  HENT: ear canals normal, TM's normal, Nose normal, Mouth- no ulcers, no lesions  NECK: no adenopathy, no asymmetry, no masses,  and thyroid normal to palpation, supple  RESP: lungs clear to auscultation - no rales, no rhonchi, no wheezes  CV: regular rates and rhythm, normal S1 S2, no S3 or S4 and no murmur, no click or rub -  ABDOMEN: soft, no tenderness, no  hepatosplenomegaly, no masses, normal bowel sounds  MS: extremities normal- no gross deformities noted, no edema  SKIN: no suspicious lesions, no rashes  Affect: Appropriate/mood-congruent           Labs:   Results from last visit:  Office Visit on 10/18/2017   Component Date Value Ref Range Status     Albumin 10/18/2017 4.6  3.8 - 5.0 mg/dL Final     Alkaline Phosphatase 10/18/2017 58.6  31.7 - 110.7 U/L Final     ALT 10/18/2017 12.3  0.0 - 45.0 U/L Final     AST 10/18/2017 16.0  0.0 - 55.0 U/L Final     Bilirubin Total 10/18/2017 0.9  0.2 - 1.3 mg/dL Final     Urea Nitrogen 10/18/2017 13.1  7.0 - 21.0 mg/dL Final     Calcium 10/18/2017 9.9  8.5 - 10.1 mg/dL Final     Chloride 10/18/2017 99.9  98.0 - 110.0 mmol/L Final     Carbon Dioxide 10/18/2017 29.1  20.0 - 32.0 mmol/L Final     Creatinine 10/18/2017 0.7  0.7 - 1.3 " mg/dL Final     Glucose 10/18/2017 100.7* 70.0 - 99.0 mg'dL Final     Potassium 10/18/2017 4.2  3.3 - 4.5 mmol/dL Final     Sodium 10/18/2017 135.0  132.6 - 141.4 mmol/L Final     Protein Total 10/18/2017 6.9  6.8 - 8.8 g/dL Final     GFR Estimate 10/18/2017 >90  >60.0 mL/min/1.7 m2 Final     GFR Estimate If Black 10/18/2017 >90  >60.0 mL/min/1.7 m2 Final     Hemoglobin 10/18/2017 16.1  13.3 - 17.7 g/dL Final     Cholesterol 10/18/2017 146.8  0.0 - 200.0 mg/dL Final     Cholesterol/HDL Ratio 10/18/2017 3.2  0.0 - 5.0 Final     HDL Cholesterol 10/18/2017 45.9  >40.0 mg/dL Final     LDL Cholesterol Calculated 10/18/2017 89  0 - 129 mg/dL Final     Triglycerides 10/18/2017 58.2  0.0 - 150.0 mg/dL Final     VLDL Cholesterol 10/18/2017 11.6  7.0 - 32.0 mg/dL Final     Testosterone Total 10/18/2017 712  240 - 950 ng/dL Final    Comment: This test was developed and its performance characteristics determined by the   [facility],  Special Chemistry Laboratory. It has   not been cleared or approved by the FDA. The laboratory is regulated under   CLIA as qualified to perform high-complexity testing. This test is used for   clinical purposes. It should not be regarded as investigational or for   research.           Assessment and Plan       1. Gender dysphoria in adolescent and adult  Increased dosage of estradiol from 4mg to 6mg today.  Getting labs today.  Patient will be contacted if abnormal results or change in hormone plan.  Continue spinolactone 100mg daily.  Discussed adding finesteride.  Will hold off now given that it is very early in HRT and we are not yet at optimal estradiol dosing.  Will re-address over time after estradiol is at goal dosage.    - Testosterone Total  - Comprehensive Metabolic Panel  - Hemoglobin (HGB)  - Lipid Cascade  - Glucose  - spironolactone (ALDACTONE) 100 MG tablet; Take 1 tablet (100 mg) by mouth daily  Dispense: 60 tablet; Refill: 3  - estradiol (ESTRACE) 2 MG  tablet; Take 3 tablets (6 mg) by mouth daily  Dispense: 120 tablet; Refill: 0    Won Ledesma MD  PGY-2 Resident  [facility]  [phone]

## 2017-11-14 NOTE — PROGRESS NOTES
Preceptor Attestation:   Patient seen and discussed with the resident. Assessment and plan reviewed with resident and agreed upon.   Supervising Physician:  Dori Streeter MD  [facility]

## 2017-11-14 NOTE — PATIENT INSTRUCTIONS
Here is the plan from today's visit    1. Gender dysphoria in adolescent and adult  Increased dosage of estradiol from 4mg to 6mg today.  Getting labs today.  Patient will be contacted if abnormal results or change in hormone plan.  Continue spinolactone 100mg daily.  Discussed adding finesteride.  Will hold off now given that it is very early in HRT and we are not yet at optimal estradiol dosing.  Will re-address over time after estradiol is at goal dosage.    - Testosterone Total  - Comprehensive Metabolic Panel  - Hemoglobin (HGB)  - Lipid Cascade  - Glucose  - spironolactone (ALDACTONE) 100 MG tablet; Take 1 tablet (100 mg) by mouth daily  Dispense: 60 tablet; Refill: 3  - estradiol (ESTRACE) 2 MG tablet; Take 3 tablets (6 mg) by mouth daily  Dispense: 120 tablet; Refill: 0      Please call or return to clinic if your symptoms don't go away.    Follow up plan  Please make a clinic appointment for follow up with me (BARB SIMON) in 1 month  Thank you for coming to [facility] today.  Lab Testing:  **If you had lab testing today and your results are reassuring or normal they will be mailed to you or sent through [employer] within 7 days.   **If the lab tests need quick action we will call you with the results.  The phone number we will call with results is # [phone] (home) . If this is not the best number please call our clinic and change the number.  Medication Refills:  If you need any refills please call your pharmacy and they will contact us.   If you need to  your refill at a new pharmacy, please contact the new pharmacy directly. The new pharmacy will help you get your medications transferred faster.   Scheduling:  If you have any concerns about today's visit or wish to schedule another appointment please call our office during normal business hours [phone] (8-5:00 M-F)  If a referral was made to a [facility] and you don't get a call from [facility]  scheduling please call [phone].  If a Mammogram was ordered for you at [facility] call [phone] to schedule or change your appointment.  If you had an XRay/CT/Ultrasound/MRI ordered the number is [phone] to schedule or change your radiology appointment.   Medical Concerns:  If you have urgent medical concerns please call [phone] at any time of the day.        Some online resources for transgender health    [place] [facility]   Home to the [facility] at [address] [place] [address], [phone]. Also has support groups.  http://www.[place]transhealth.org/    [facility] Transgender [facility]  Increasing access to comprehensive, effective, and affirming healthcare services for trans and gender-variant communities.  http://www.transhealth.[facility].edu/trans?page=ajith-00-05    [facility]   Community-based non-profit committed to advancing the health & wellness of LGBTQ communities through research, education and advocacy. http://www.[employer].org    [facility] Glossary of Transgender Terms  http://www.[facility].org/site/DocServer/Handout_7-C_Glossary_of_Gender_and_Transgender_Terms__fi.pdf?ighHT=9582    Safe, gender-neutral public restrooms in the [facility]   http://www.[facility].org//index.php?option=com_content&task=view&id=12&Itemid    Trans Youth Support Network  For people 26 and under who identify as a trans or gender non-conforming person and want to be a part of an activist organization. Offers peer support, education and community building opportunities.   http://www.transyouthsupportnetwork.org/    Reclaim:  RECLAIM offers mental and integrative health services for LGBTQ youth and their families.  http://www.reclaim-lgbtyouth.org/    Gender Spectrum, for Trans Youth  Gender Spectrum provides education, training and support to help create a gender sensitive and inclusive environment for all children and teens.  http://www.genderspectrum.org/    [facility] FTM Resource Guide  Information on topics of interest to female-to-male (FTM, F2M) trans men, and their friends and loved ones.  http://ftmguide.org/    Also check out your local [employer] queer resource center!  LGBTQIA Services at [facility]: http://www.[facility]/lgbtqia/  LGBTQ@[facility] at [facility]: http://www.[facility]/multiculturalJet/lgbtq/  GLBTA Programs office at [facility] : https://diversity.[facility]/glbta/            Thoughts of self harm?   Trans Lifeline can be reached at [phone].   This service is staffed by trans people 24/7.   For LGBT youth (ages 24 and younger) contemplating suicide, the [employer] Lifeline can be reached at [phone].       FEMINIZING Medications, Labs and f/u for Transwomen (MTF)Updated 6/2016  Drug Dose s/p Orchiectomy Typical patient Miscellaneous Details   Estradiol po  (Estrace) Start: 2mg pills daily at same time  Typical: 4-6mg     Max: 8mg Typical 1-2mg daily <39 yo, nonsmoker  good w/ daily meds Erectile dysfunction that is bothersome ? typical ED treatments.    Estradiol patch (Vivelle-Dot) apply 2x per wk  Start:  mcg/24 hr patch,   Typical: 100-200mcg daily  Max: 2 patches 2x/wk (200mcg/d) Typical: 37.5- 100mcg daily, changed 2x/wk Hx VTE, >40, smoker, transaminitis Patch falling off? Clean w/ EtOH first, apply & press x30sec. Baby oil to remove adhesive. Rx for 8 -16 patches/month.   Estradiol valerate (Delestrogen) Start: 5 mg IM weekly  Typical: 5-10 mg IM weekly  Max: 15-20 mg weekly 5mg IM weekly or 10 IM q2wk Ok w/ needles, >40, already on it Needles: 23-25gauge 1inch (if thigh) to 1.5inch (if glut), 1cc syringe, 18ga to draw up. Comes in 5ml bottles.   Estradiol cream  compounded Start: 50-100mcg to thighs daily  Typical: 100-200mcg   Max: 200 37.5-100mcg daily Ok w/ topical, paying out of pocket Pharm: [facility] [address] online, in PLO gel   Conj. Estrogens (premarin)   Typical: 5mg daily. Avoid if possible due to higher risk VTE Get IMAN & ensure informed of risk Consult local expert before long term continuation.    Spironolactone Start: 50-100mg po daily  Max: 200 (MOST)-300mg  DC Bothersome hair, acne or erections AntiAndrogen. Check bmp when on max dose. Split dose if dizzy.   Finasteride Start: 2.5mg po daily  Max: 5-10mg daily DC unless for alopecia Bothersome hair, alopecia, or erections AntiAndrogen - ok to use in combo w/ casper if poor feminizing effect   ?Progesterone? RARELY USE 200mg prometrium HS or q3mo depo shots IF ASKED ONLY DC Wants breast dev, no depression/obesity Causes wt gain and a lot of depression. Data inconclusive on breast developmt.     LABS Hgb Fasting Glu Fasting Lipid Total testosterone LFT s Prolactin Estrogen Ultrasens.   On shots? Draw labs  Midcycle! ( - way btw shots) -Baseline  -3 mo after   -6mo after  -then annual  After=after start or dose change Or A1c  -Baseline  -3 mo after   -6mo after  -then annual -Baseline  -3 mo after   -6mo after  -then annual -Baseline  -3 mo after   -6mo after  -q1yr.   Goal level <50 -Baseline  -3 mo after   -6mo after  -q1yr. Abnl?RUQ US, hep serology -if Sx only, rare.  If >100 needs f/u, MRI Check ONLY if on injectable or not clinically feminizing. Level should be 100-200.  >200 ?VTE risk and dose must be ?immediately, f/u in 1 month   On casper -Baseline BUN/Cr/lytes, Again when MAX dose  -annually thereafter, careful with ACEi s Follow up lab abnormalities w/repeat labs  UDon t panic ?may need small adjustment in dose.      -PPsychotherapy: not required! REC if: no consolidated gender, not gender dysphoric by criteria, nonconforming & may not need hormones  -E-Estrogen therapy is CONTRAINDICATED IF estrogen dependent cancer  -G-Gender related effects: Breast development, redistribution of body fat, softening of skin, ?mood, testes shrink, ?libido and ? fertility  -O-Other effects: ?VTE risk (steven if >40,  smoker, obese, hx clots, po meds), ?Weight, emotional lability, ?lipids, ?erections, ?gallstones  **Upcoming surgery? HOLD ESTROGEN for 2-4 weeks prior to surgical procedure Adapted by Nasra Johns MD from:  Dept Public Health, [facility] Trans* [facility] of Excellence, [facility], [facility] Guidelines, [facility] SOC-7.  Updated 6/2016  Trans Guidelines [facility]           Effects and Expected Time Course of Feminizing Hormones    Effect Expected Onset Expected Maximum Effect   Body Fat redistribution 3-6 months 2-5 years   Decreased Muscle mass 3-6 months 1-2 years   Softening of skin/decreased oiliness 3-6 months Unknown   Decreased Libido 1-3 months 1-2 years   Decreased Spontaneous Erections 1-3 months 3-6 months   Male Sexual Dysfunction Variable Variable   Breast Growth 3-6 months 2-3 years   Decreased Testicular volume 3-6 months 2-3 years   Decreased sperm production Variable Variable   Thinning and slowed growth of body and facial hair  + 6-12 months >3 years   Male pattern baldness No regrowth, loss stops 1-3 months 1-2 years   +complete removal of male facial hair and body hair requires electrolysis, laser treatment or both

## 2017-11-14 NOTE — MR AVS SNAPSHOT
After Visit Summary   11/14/2017    Carlos Yu    MRN: [id number]           Patient Information     Date Of Birth          1990        Visit Information        Provider Department      11/14/2017 1:00 PM Won Simon MD [facility]        Today's Diagnoses     Gender dysphoria in adolescent and adult    -  1    Gender dysphoria in adult          Care Instructions    Here is the plan from today's visit    1. Gender dysphoria in adolescent and adult  Increased dosage of estradiol from 4mg to 6mg today.  Getting labs today.  Patient will be contacted if abnormal results or change in hormone plan.  Continue spinolactone 100mg daily.  Discussed adding finesteride.  Will hold off now given that it is very early in HRT and we are not yet at optimal estradiol dosing.  Will re-address over time after estradiol is at goal dosage.    - Testosterone Total  - Comprehensive Metabolic Panel  - Hemoglobin (HGB)  - Lipid Cascade  - Glucose  - spironolactone (ALDACTONE) 100 MG tablet; Take 1 tablet (100 mg) by mouth daily  Dispense: 60 tablet; Refill: 3  - estradiol (ESTRACE) 2 MG tablet; Take 3 tablets (6 mg) by mouth daily  Dispense: 120 tablet; Refill: 0      Please call or return to clinic if your symptoms don't go away.    Follow up plan  Please make a clinic appointment for follow up with me (WON SIMON) in 1 month  Thank you for coming to [facility] today.  Lab Testing:  **If you had lab testing today and your results are reassuring or normal they will be mailed to you or sent through [facility]yo within 7 days.   **If the lab tests need quick action we will call you with the results.  The phone number we will call with results is # [phone] (home) . If this is not the best number please call our clinic and change the number.  Medication Refills:  If you need any refills please call your pharmacy and they will contact us.   If you need to  your  refill at a new pharmacy, please contact the new pharmacy directly. The new pharmacy will help you get your medications transferred faster.   Scheduling:  If you have any concerns about today's visit or wish to schedule another appointment please call our office during normal business hours [phone] (8-5:00 M-F)  If a referral was made to a [facility] and you don't get a call from central scheduling please call [phone].  If a Mammogram was ordered for you at [facility] call [phone] to schedule or change your appointment.  If you had an XRay/CT/Ultrasound/MRI ordered the number is [phone] to schedule or change your radiology appointment.   Medical Concerns:  If you have urgent medical concerns please call [phone] at any time of the day.        Some online resources for transgender health    [place] Transgender Health Coalition   Home to the [facility] at [address] [place] [address], [phone]. Also has support groups.  http://www.[place]transhealth.org/    Center of Excellence for Transgender Health  Increasing access to comprehensive, effective, and affirming healthcare services for trans and gender-variant communities.  http://www.transhealth.[facility].edu/trans?page=ajith-00-05    [facility]   Community-based non-profit committed to advancing the health & wellness of LGBTQ communities through research, education and advocacy. http://www.Indiewallshealth.org    [facility] Glossary of Transgender Terms  http://www.[facility].org/site/DocServer/Handout_7-C_Glossary_of_Gender_and_Transgender_Terms__fi.pdf?rboPM=6621    Safe, gender-neutral public restrooms in the [facility]   http://www.[place]transhealth.org//index.php?option=com_content&task=view&id=12&Itemid    Trans Youth Support Network  For people 26 and under who identify as a trans or gender non-conforming person and want to be a part of an activist organization. Offers peer support, education and  community building opportunities.   http://www.transyouthsupportnetwork.org/    Reclaim:  RECLAIM offers mental and integrative health services for LGBTQ youth and their families.  http://www.reclaim-lgbtyouth.org/    Gender Spectrum, for Trans Youth  Gender Spectrum provides education, training and support to help create a gender sensitive and inclusive environment for all children and teens. http://www.genderspectrum.org/    [employer] FTM Resource Guide  Information on topics of interest to female-to-male (FTM, F2M) trans men, and their friends and loved ones.  http://ftmguide.org/    Also check out your local [facility] queer resource center!  LGBTQIA Services at [facility]: http://www.[facility]/lgbtqia/  LGBTQ@[facility]: http://www.[facility]/[employer]/lgbtq/  GLBTA Programs office at  of : https://diversity.[employer].[facility]/glbta/            Thoughts of self harm?   Trans Lifeline can be reached at [phone].   This service is staffed by trans people 24/7.   For LGBT youth (ages 24 and younger) contemplating suicide, the [employer] Lifeline can be reached at [phone].       FEMINIZING Medications, Labs and f/u for Transwomen (MTF)Updated 6/2016  Drug Dose s/p Orchiectomy Typical patient Miscellaneous Details   Estradiol po  (Estrace) Start: 2mg pills daily at same time  Typical: 4-6mg     Max: 8mg Typical 1-2mg daily <41 yo, nonsmoker  good w/ daily meds Erectile dysfunction that is bothersome ? typical ED treatments.    Estradiol patch (Vivelle-Dot) apply 2x per wk  Start:  mcg/24 hr patch,   Typical: 100-200mcg daily  Max: 2 patches 2x/wk (200mcg/d) Typical: 37.5- 100mcg daily, changed 2x/wk Hx VTE, >40, smoker, transaminitis Patch falling off? Clean w/ EtOH first, apply & press x30sec. Baby oil to remove adhesive. Rx for 8 -16 patches/month.   Estradiol valerate (Delestrogen) Start: 5 mg IM weekly  Typical: 5-10 mg IM weekly  Max: 15-20 mg weekly 5mg IM weekly or 10 IM q2wk Ok w/  needles, >40, already on it Needles: 23-25gauge 1inch (if thigh) to 1.5inch (if glut), 1cc syringe, 18ga to draw up. Comes in 5ml bottles.   Estradiol cream  compounded Start: 50-100mcg to thighs daily  Typical: 100-200mcg   Max: 200 37.5-100mcg daily Ok w/ topical, paying out of pocket Pharm: [facility] [address], [facility] [address], May s online, in PLO gel   Conj. Estrogens (premarin)  Typical: 5mg daily. Avoid if possible due to higher risk VTE Get IMAN & ensure informed of risk Consult local expert before long term continuation.    Spironolactone Start: 50-100mg po daily  Max: 200 (MOST)-300mg  DC Bothersome hair, acne or erections AntiAndrogen. Check bmp when on max dose. Split dose if dizzy.   Finasteride Start: 2.5mg po daily  Max: 5-10mg daily DC unless for alopecia Bothersome hair, alopecia, or erections AntiAndrogen - ok to use in combo w/ casper if poor feminizing effect   ?Progesterone? RARELY USE 200mg prometrium HS or q3mo depo shots IF ASKED ONLY DC Wants breast dev, no depression/obesity Causes wt gain and a lot of depression. Data inconclusive on breast developmt.     LABS Hgb Fasting Glu Fasting Lipid Total testosterone LFT s Prolactin Estrogen Ultrasens.   On shots? Draw labs  Midcycle! ( - way btw shots) -Baseline  -3 mo after   -6mo after  -then annual  After=after start or dose change Or A1c  -Baseline  -3 mo after   -6mo after  -then annual -Baseline  -3 mo after   -6mo after  -then annual -Baseline  -3 mo after   -6mo after  -q1yr.   Goal level <50 -Baseline  -3 mo after   -6mo after  -q1yr. Abnl?RUQ US, hep serology -if Sx only, rare.  If >100 needs f/u, MRI Check ONLY if on injectable or not clinically feminizing. Level should be 100-200.  >200 ?VTE risk and dose must be ?immediately, f/u in 1 month   On casper -Baseline BUN/Cr/lytes, Again when MAX dose  -annually thereafter, careful with ACEi s Follow up lab abnormalities w/repeat labs  UDon t panic ?may need small adjustment in dose.       -PPsychotherapy: not required! REC if: no consolidated gender, not gender dysphoric by criteria, nonconforming & may not need hormones  -E-Estrogen therapy is CONTRAINDICATED IF estrogen dependent cancer  -G-Gender related effects: Breast development, redistribution of body fat, softening of skin, ?mood, testes shrink, ?libido and ? fertility  -O-Other effects: ?VTE risk (steven if >40, smoker, obese, hx clots, po meds), ?Weight, emotional lability, ?lipids, ?erections, ?gallstones  **Upcoming surgery? HOLD ESTROGEN for 2-4 weeks prior to surgical procedure Adapted by Nasra Johns MD from:  Dept Public Health, [facility] Trans* Center of Excellence, [facility] Guidelines, [facility] SOC-7.  Updated 6/2016  Trans Guidelines [facility]           Effects and Expected Time Course of Feminizing Hormones    Effect Expected Onset Expected Maximum Effect   Body Fat redistribution 3-6 months 2-5 years   Decreased Muscle mass 3-6 months 1-2 years   Softening of skin/decreased oiliness 3-6 months Unknown   Decreased Libido 1-3 months 1-2 years   Decreased Spontaneous Erections 1-3 months 3-6 months   Male Sexual Dysfunction Variable Variable   Breast Growth 3-6 months 2-3 years   Decreased Testicular volume 3-6 months 2-3 years   Decreased sperm production Variable Variable   Thinning and slowed growth of body and facial hair  + 6-12 months >3 years   Male pattern baldness No regrowth, loss stops 1-3 months 1-2 years   +complete removal of male facial hair and body hair requires electrolysis, laser treatment or both                    Follow-ups after your visit        Follow-up notes from your care team     Return in about 1 month (around 12/14/2017).      Who to contact     Please call your clinic at [phone] to:    Ask questions about your health    Make or cancel appointments    Discuss your medicines    Learn about your test results    Speak to your doctor   If you have compliments or concerns about an experience at  your clinic, or if you wish to file a complaint, please contact [facility] Patient Relations at [phone] or email us at [email or web address]         Additional Information About Your Visit        Atigeohart Information     Paddle8t gives you secure access to your electronic health record. If you see a primary care provider, you can also send messages to your care team and make appointments. If you have questions, please call your primary care clinic.  If you do not have a primary care provider, please call [phone] and they will assist you.      Dinglepharb is an electronic gateway that provides easy, online access to your medical records. With Dinglepharb, you can request a clinic appointment, read your test results, renew a prescription or communicate with your care team.     To access your existing account, please contact your [facility] or call [phone] for assistance.        Care EveryWhere ID     This is your Care EveryWhere ID. This could be used by other organizations to access your [facility] medical records  [id number]        Your Vitals Were     Pulse Temperature Respirations Pulse Oximetry BMI (Body Mass Index)       78 98.1  F (36.7  C) (Oral) 18 95% 16.69 kg/m2        Blood Pressure from Last 3 Encounters:   11/14/17 121/73   10/18/17 117/75   09/14/17 121/67    Weight from Last 3 Encounters:   11/14/17 130 lb (59 kg)   10/18/17 130 lb (59 kg)   09/14/17 129 lb (58.5 kg)              We Performed the Following     Comprehensive Metabolic Panel     Glucose     Hemoglobin (HGB)     Lipid Cascade     Testosterone Total          Today's Medication Changes          These changes are accurate as of: 11/14/17  1:35 PM.  If you have any questions, ask your nurse or doctor.               These medicines have changed or have updated prescriptions.        Dose/Directions    estradiol 2 MG tablet   Commonly known as:  ESTRACE   This may have  changed:    - how much to take  - Another medication with the same name was removed. Continue taking this medication, and follow the directions you see here.   Used for:  Gender dysphoria in adult   Changed by:  Won Ledesma MD        Dose:  6 mg   Take 3 tablets (6 mg) by mouth daily   Quantity:  120 tablet   Refills:  0       spironolactone 100 MG tablet   Commonly known as:  ALDACTONE   This may have changed:  Another medication with the same name was removed. Continue taking this medication, and follow the directions you see here.   Used for:  Gender dysphoria in adolescent and adult   Changed by:  Won Ledesma MD        Dose:  100 mg   Take 1 tablet (100 mg) by mouth daily   Quantity:  60 tablet   Refills:  3            Where to get your medicines      These medications were sent to [facility] [place], [place] - [address] [place] [address]     Phone:  [phone]     estradiol 2 MG tablet    spironolactone 100 MG tablet                Primary Care Provider Office Phone # Fax #    Won Ledesma [phone]       [facility] [address] [facility] [address]  [facility] [address]        Equal Access to Services     FER SHEARER : Hadii blayne brand hadasho Sotreva, waaxda luqadaha, qaybta kaalmada adeabhishekyada, papa miramontes . So [facility] [phone].    ATENCIÓN: Si habla español, tiene a delgado disposición servicios gratuitos de asistencia lingüística. Llame al [phone].    We comply with applicable federal civil rights laws and [place] laws. We do not discriminate on the basis of race, color, national origin, age, disability, sex, sexual orientation, or gender identity.            Thank you!     Thank you for choosing [facility]  for your care. Our goal is always to provide you with excellent care. Hearing back from our patients is one way we can continue to improve our  services. Please take a few minutes to complete the written survey that you may receive in the mail after your visit with us. Thank you!             Your Updated Medication List - Protect others around you: Learn how to safely use, store and throw away your medicines at www.disposemymeds.org.          This list is accurate as of: 11/14/17  1:35 PM.  Always use your most recent med list.                   Brand Name Dispense Instructions for use Diagnosis    estradiol 2 MG tablet    ESTRACE    120 tablet    Take 3 tablets (6 mg) by mouth daily    Gender dysphoria in adult       spironolactone 100 MG tablet    ALDACTONE    60 tablet    Take 1 tablet (100 mg) by mouth daily    Gender dysphoria in adolescent and adult

## 2017-11-16 LAB — TESTOST SERPL-MCNC: 397 NG/DL (ref 240–950)

## 2017-12-11 ENCOUNTER — OFFICE VISIT (OUTPATIENT)
Dept: FAMILY MEDICINE | Facility: CLINIC | Age: 27
End: 2017-12-11

## 2017-12-11 VITALS
HEART RATE: 76 BPM | RESPIRATION RATE: 20 BRPM | BODY MASS INDEX: 16.38 KG/M2 | DIASTOLIC BLOOD PRESSURE: 85 MMHG | SYSTOLIC BLOOD PRESSURE: 128 MMHG | TEMPERATURE: 98.1 F | WEIGHT: 127.6 LBS | OXYGEN SATURATION: 98 %

## 2017-12-11 DIAGNOSIS — F64.0 GENDER DYSPHORIA IN ADULT: Primary | ICD-10-CM

## 2017-12-11 DIAGNOSIS — F64.0 GENDER DYSPHORIA IN ADOLESCENT AND ADULT: ICD-10-CM

## 2017-12-11 LAB
ALBUMIN SERPL-MCNC: 4.4 MG/DL (ref 3.8–5)
ALP SERPL-CCNC: 51.5 U/L (ref 31.7–110.7)
ALT SERPL-CCNC: 10.1 U/L (ref 0–45)
AST SERPL-CCNC: 16.5 U/L (ref 0–55)
BILIRUB SERPL-MCNC: 1 MG/DL (ref 0.2–1.3)
BUN SERPL-MCNC: 12.7 MG/DL (ref 7–21)
CALCIUM SERPL-MCNC: 9.5 MG/DL (ref 8.5–10.1)
CHLORIDE SERPLBLD-SCNC: 101 MMOL/L (ref 98–110)
CHOLEST SERPL-MCNC: 119.7 MG/DL (ref 0–200)
CHOLEST/HDLC SERPL: 2.7 {RATIO} (ref 0–5)
CO2 SERPL-SCNC: 31.4 MMOL/L (ref 20–32)
CREAT SERPL-MCNC: 0.7 MG/DL (ref 0.7–1.3)
GFR SERPL CREATININE-BSD FRML MDRD: >90 ML/MIN/1.7 M2
GLUCOSE SERPL-MCNC: 89.6 MG'DL (ref 70–99)
HDLC SERPL-MCNC: 44.4 MG/DL
HEMOGLOBIN: 16.4 G/DL (ref 13.3–17.7)
LDLC SERPL CALC-MCNC: 67 MG/DL (ref 0–129)
POTASSIUM SERPL-SCNC: 4 MMOL/DL (ref 3.3–4.5)
PROT SERPL-MCNC: 7.2 G/DL (ref 6.8–8.8)
SODIUM SERPL-SCNC: 138.6 MMOL/L (ref 132.6–141.4)
TRIGL SERPL-MCNC: 42 MG/DL (ref 0–150)
VLDL CHOLESTEROL: 8.4 MG/DL (ref 7–32)

## 2017-12-11 RX ORDER — ESTRADIOL 2 MG/1
8 TABLET ORAL DAILY
Qty: 120 TABLET | Refills: 0 | Status: SHIPPED | OUTPATIENT
Start: 2017-12-11 | End: 2018-01-02

## 2017-12-11 RX ORDER — SPIRONOLACTONE 100 MG/1
100 TABLET, FILM COATED ORAL DAILY
Qty: 60 TABLET | Refills: 5 | Status: SHIPPED | OUTPATIENT
Start: 2017-12-11 | End: 2018-04-10

## 2017-12-11 NOTE — PROGRESS NOTES
"          LENARD Gonzalez is a 27 year old individual that uses pronouns She/Her/Hers/Herself that presents today for follow up of:  feminizing hormone therapy. Gender identity: Female    Any special concerns today?  Hair loss for last 5-6 years (thinning, male pattern)  On hormones?  YES       +++ Refills of meds needed?  Yes  ---    Past Surgical History:   Procedure Laterality Date     NO HISTORY OF SURGERY         Patient Active Problem List   Diagnosis     Gender dysphoria in adolescent and adult     Mild intermittent asthma       Current Outpatient Prescriptions   Medication Sig Dispense Refill     spironolactone (ALDACTONE) 100 MG tablet Take 1 tablet (100 mg) by mouth daily 60 tablet 3     estradiol (ESTRACE) 2 MG tablet Take 3 tablets (6 mg) by mouth daily 120 tablet 0       History   Smoking Status     Never Smoker   Smokeless Tobacco     Never Used        No Known Allergies    Problem, Medication and Allergy Lists were reviewed and updated if needed..         Review of Systems:      General    Fat redistribution: no    Weight change: no HEENT    Voice change: no     Cardiovascular (CV)    Chest Pains: no    Shortness of breath: no Chest    Decreased exercise tolerance:  no    Breast changes/development: Hard to tell, nipples have changed a lot - bigger, tender     Gastrointestinal (GI)    Abdominal pain: no    Change in appetite: no Skin    Acne or oily skin: no    Change in hair: Not due to starting hormones, but having significant hair loss dating back to prior to hormones.  She does think that facial hair is growing a little slower     Genitourinary ()    Abnormal vaginal bleeding: not applicable   Decreased spontaneous erections: YES, not an adverse effect      Change in libido: YES, decreased        New sexual partners: no Musculoskeletal    Leg pain or swelling: no     Psychiatric (Psych)    Depression: no    Anxiety/Panic: no    Mood:  \"good, a little sleepy\"                    Physical Exam: "   Vitals:    12/11/17 1544   BP: 128/85   Pulse: 76   Resp: 20   Temp: 98.1  F (36.7  C)   TempSrc: Oral   SpO2: 98%   Weight: 127 lb 9.6 oz (57.9 kg)     BMI= Body mass index is 16.38 kg/(m^2).   Wt Readings from Last 10 Encounters:   12/11/17 127 lb 9.6 oz (57.9 kg)   11/14/17 130 lb (59 kg)   10/18/17 130 lb (59 kg)   09/14/17 129 lb (58.5 kg)   08/16/17 126 lb 6.4 oz (57.3 kg)   07/21/17 125 lb (56.7 kg)     Appearance: Male appearance and ambiguous dress    GENERAL:: healthy, alert and no distress  HENT: ear canals normal, TM's normal, Nose normal, Mouth- no ulcers, no leisions.  Some thinning of hair on top of head  NECK: no adenopathy, no asymmetry, no masses,  and thyroid normal to palpation, supple  RESP: lungs clear to auscultation - no rales, no rhonchi, no wheezes  CV: regular rates and rhythm, normal S1 S2, no S3 or S4 and no murmur, no click or rub -  NEURO: Normal strength and tone  Psych: Alert and oriented times 3; coherent speech, normal rate and volume, able to articulate logical thoughts, able to abstract reason, no tangential thoughts, no hallucinations or delusions. Affect is normal.  Affect: Appropriate/mood-congruent           Labs:   Results from last visit:  Office Visit on 11/14/2017   Component Date Value Ref Range Status     Testosterone Total 11/14/2017 397  240 - 950 ng/dL Final    Comment: This test was developed and its performance characteristics determined by the   [facility],  Special Chemistry Laboratory. It has   not been cleared or approved by the FDA. The laboratory is regulated under   CLIA as qualified to perform high-complexity testing. This test is used for   clinical purposes. It should not be regarded as investigational or for   research.       Albumin 11/14/2017 4.5  3.8 - 5.0 mg/dL Final     Alkaline Phosphatase 11/14/2017 60.5  31.7 - 110.7 U/L Final     ALT 11/14/2017 11.9  0.0 - 45.0 U/L Final     AST 11/14/2017 16.5  0.0 - 55.0 U/L Final      Bilirubin Total 11/14/2017 1.1  0.2 - 1.3 mg/dL Final     Urea Nitrogen 11/14/2017 9.2  7.0 - 21.0 mg/dL Final     Calcium 11/14/2017 9.5  8.5 - 10.1 mg/dL Final     Chloride 11/14/2017 99.5  98.0 - 110.0 mmol/L Final     Carbon Dioxide 11/14/2017 28.5  20.0 - 32.0 mmol/L Final     Creatinine 11/14/2017 0.6* 0.7 - 1.3 mg/dL Final     Glucose 11/14/2017 92.5  70.0 - 99.0 mg'dL Final     Potassium 11/14/2017 3.8  3.3 - 4.5 mmol/dL Final     Sodium 11/14/2017 136.8  132.6 - 141.4 mmol/L Final     Protein Total 11/14/2017 7.6  6.8 - 8.8 g/dL Final     GFR Estimate 11/14/2017 >90  >60.0 mL/min/1.7 m2 Final     GFR Estimate If Black 11/14/2017 >90  >60.0 mL/min/1.7 m2 Final     Hemoglobin 11/14/2017 16.0  13.3 - 17.7 g/dL Final     Cholesterol 11/14/2017 135.0  0.0 - 200.0 mg/dL Final     Cholesterol/HDL Ratio 11/14/2017 3.1  0.0 - 5.0 Final     HDL Cholesterol 11/14/2017 43.3  >40.0 mg/dL Final     LDL Cholesterol Calculated 11/14/2017 76  0 - 129 mg/dL Final     Triglycerides 11/14/2017 80.2  0.0 - 150.0 mg/dL Final     VLDL Cholesterol 11/14/2017 16.0  7.0 - 32.0 mg/dL Final     Glucose 11/14/2017 87.0  70.0 - 99.0 mg'dL Final       Assessment and Plan     1. Gender dysphoria in adult  Will increase estradiol from 6mg to 8mg daily.  Recheck labs today and in 1 month.  [facility] finesteride as patient has concerns about hair loss with some notable hair thinning on top and front of scalp on exam.        Contraception:   abstinence  .   Counselled patient about controlled substances: Yes. Details: does not ues    Follow up:  Follow up in 1 month.  Results by mychart  Questions were elicited and answered.     Won Ledesma MD

## 2017-12-11 NOTE — MR AVS SNAPSHOT
After Visit Summary   12/11/2017    Carlos Yu    MRN: [id number]           Patient Information     Date Of Birth          1990        Visit Information        Provider Department      12/11/2017 3:40 PM Won Ledesma MD [facility]        Today's Diagnoses     Gender dysphoria in adult    -  1    Gender dysphoria in adolescent and adult           Follow-ups after your visit        Follow-up notes from your care team     Return in about 1 month (around 1/11/2018).      Who to contact     Please call your clinic at [phone] to:    Ask questions about your health    Make or cancel appointments    Discuss your medicines    Learn about your test results    Speak to your doctor   If you have compliments or concerns about an experience at your clinic, or if you wish to file a complaint, please contact [facility] Patient Relations at [phone] or email us at [email or web address]         Additional Information About Your Visit        MyChart Information     5minutest gives you secure access to your electronic health record. If you see a primary care provider, you can also send messages to your care team and make appointments. If you have questions, please call your primary care clinic.  If you do not have a primary care provider, please call [phone] and they will assist you.      Listar is an electronic gateway that provides easy, online access to your medical records. With Listar, you can request a clinic appointment, read your test results, renew a prescription or communicate with your care team.     To access your existing account, please contact your [facility] or call [phone] for assistance.        Care EveryWhere ID     This is your Care EveryWhere ID. This could be used by other organizations to access your [place] medical records  [id number]        Your Vitals Were     Pulse  Temperature Respirations Pulse Oximetry BMI (Body Mass Index)       76 98.1  F (36.7  C) (Oral) 20 98% 16.38 kg/m2        Blood Pressure from Last 3 Encounters:   12/11/17 128/85   11/14/17 121/73   10/18/17 117/75    Weight from Last 3 Encounters:   12/11/17 127 lb 9.6 oz (57.9 kg)   11/14/17 130 lb (59 kg)   10/18/17 130 lb (59 kg)              We Performed the Following     Comprehensive Metabolic Panel     Hemoglobin (HGB)     Lipid Cascade     Testosterone Total          Today's Medication Changes          These changes are accurate as of: 12/11/17 11:59 PM.  If you have any questions, ask your nurse or doctor.               These medicines have changed or have updated prescriptions.        Dose/Directions    estradiol 2 MG tablet   Commonly known as:  ESTRACE   This may have changed:  how much to take   Used for:  Gender dysphoria in adult   Changed by:  Won Ledesma MD        Dose:  8 mg   Take 4 tablets (8 mg) by mouth daily   Quantity:  120 tablet   Refills:  0            Where to get your medicines      These medications were sent to [facility] [place], [place] - [address]   [address] [place] [address]     Phone:  [phone]     estradiol 2 MG tablet    spironolactone 100 MG tablet                Primary Care Provider Office Phone # Fax #    Won Ledesma [phone]       [facility] [address] [facility] [address]  [facility] [address]        Equal Access to Services     ALIA SHEARER AH: Hadii blayne Mckenzie, waaxda luqadaha, qaybta kaalmada divyayada, waxay edmar armjio adeabhishek valerio. So [facility] [phone].    ATENCIÓN: Si habla español, tiene a delgado disposición servicios gratuitos de asistencia lingüística. Llame al [phone].    We comply with applicable federal civil rights laws and [place] laws. We do not discriminate on the basis of race, color, national origin, age, disability, sex, sexual orientation, or  gender identity.            Thank you!     Thank you for choosing [facility]  for your care. Our goal is always to provide you with excellent care. Hearing back from our patients is one way we can continue to improve our services. Please take a few minutes to complete the written survey that you may receive in the mail after your visit with us. Thank you!             Your Updated Medication List - Protect others around you: Learn how to safely use, store and throw away your medicines at www.disposemymeds.org.          This list is accurate as of: 12/11/17 11:59 PM.  Always use your most recent med list.                   Brand Name Dispense Instructions for use Diagnosis    estradiol 2 MG tablet    ESTRACE    120 tablet    Take 4 tablets (8 mg) by mouth daily    Gender dysphoria in adult       spironolactone 100 MG tablet    ALDACTONE    60 tablet    Take 1 tablet (100 mg) by mouth daily    Gender dysphoria in adolescent and adult

## 2017-12-12 ASSESSMENT — ASTHMA QUESTIONNAIRES: ACT_TOTALSCORE: 25

## 2017-12-14 LAB — TESTOST SERPL-MCNC: 536 NG/DL (ref 240–950)

## 2017-12-22 NOTE — PROGRESS NOTES
Preceptor Attestation:   Patient seen and discussed with the resident. Assessment and plan reviewed with resident and agreed upon.   Supervising Physician:  Dong Pereira MD  [facility]

## 2018-01-02 ENCOUNTER — OFFICE VISIT (OUTPATIENT)
Dept: FAMILY MEDICINE | Facility: CLINIC | Age: 28
End: 2018-01-02

## 2018-01-02 VITALS
BODY MASS INDEX: 17.05 KG/M2 | DIASTOLIC BLOOD PRESSURE: 86 MMHG | SYSTOLIC BLOOD PRESSURE: 126 MMHG | WEIGHT: 132.8 LBS | OXYGEN SATURATION: 99 % | TEMPERATURE: 97.9 F | HEART RATE: 66 BPM

## 2018-01-02 DIAGNOSIS — F64.0 GENDER DYSPHORIA IN ADULT: ICD-10-CM

## 2018-01-02 DIAGNOSIS — L65.9 ALOPECIA: ICD-10-CM

## 2018-01-02 DIAGNOSIS — F64.0 GENDER DYSPHORIA IN ADOLESCENT AND ADULT: Primary | ICD-10-CM

## 2018-01-02 RX ORDER — ESTRADIOL 2 MG/1
8 TABLET ORAL DAILY
Qty: 360 TABLET | Refills: 1 | Status: SHIPPED | OUTPATIENT
Start: 2018-01-02 | End: 2018-07-02

## 2018-01-02 RX ORDER — FINASTERIDE 1 MG/1
1 TABLET, FILM COATED ORAL DAILY
Qty: 90 TABLET | Refills: 1 | Status: SHIPPED | OUTPATIENT
Start: 2018-01-02 | End: 2018-07-02

## 2018-01-02 NOTE — PROGRESS NOTES
LENARD Gonzalez is a 27 year old individual that uses pronouns She/Her/Hers/Herself that presents today for follow up of:  feminizing hormone therapy. Gender identity: Female    Any special concerns today?  concerns about insurance coverage for lab work, new insurance doesn't kick in until 2/1/18.  Also wants to discuss finasteride for thinning of hair.    On hormones?  YES +++     Due for labs?  Yes, but will wait until 2/1/18    +++ Refills of meds needed?  Yes  ---    Past Surgical History:   Procedure Laterality Date     NO HISTORY OF SURGERY         Patient Active Problem List   Diagnosis     Gender dysphoria in adolescent and adult     Mild intermittent asthma       Current Outpatient Prescriptions   Medication Sig Dispense Refill     estradiol (ESTRACE) 2 MG tablet Take 4 tablets (8 mg) by mouth daily 120 tablet 0     spironolactone (ALDACTONE) 100 MG tablet Take 1 tablet (100 mg) by mouth daily 60 tablet 5       History   Smoking Status     Never Smoker   Smokeless Tobacco     Never Used        No Known Allergies    Problem, Medication and Allergy Lists were reviewed and updated if needed..         Review of Systems:      General    Fat redistribution: no    Weight change: no HEENT    Voice change: no     Cardiovascular (CV)    Chest Pains: no    Shortness of breath: no Chest    Decreased exercise tolerance:  no  Breast changes/development: YES, nipples more prominent, and more tender         Gastrointestinal (GI)    Abdominal pain: no    Change in appetite: no Skin    Acne or oily skin: no  Change in hair: YES, slower growing facial hair; does note some thinning of the hair, but unrelated to the hormones (predates HRT)         Genitourinary ()    Abnormal vaginal bleeding: no   Decreased spontaneous erections: YES, good thing  Change in libido: YES, decreased; not problematic        New sexual partners: no Musculoskeletal    Leg pain or swelling: no     Psychiatric (Psych)    Depression:  "no    Anxiety/Panic: no    Mood:  \"good\"                    Physical Exam:   Vitals:    01/02/18 1303   BP: 126/86   Pulse: 66   Temp: 97.9  F (36.6  C)   TempSrc: Oral   SpO2: 99%   Weight: 132 lb 12.8 oz (60.2 kg)     BMI= Body mass index is 17.05 kg/(m^2).   Wt Readings from Last 10 Encounters:   01/02/18 132 lb 12.8 oz (60.2 kg)   12/11/17 127 lb 9.6 oz (57.9 kg)   11/14/17 130 lb (59 kg)   10/18/17 130 lb (59 kg)   09/14/17 129 lb (58.5 kg)   08/16/17 126 lb 6.4 oz (57.3 kg)   07/21/17 125 lb (56.7 kg)     Appearance: Male appearance and dress    GENERAL:: healthy, alert and no distress  EYES: Eyes grossly normal to inspection, fundi benign and PERRL  NECK: no adenopathy, no asymmetry, no masses, supple  RESP: lungs clear to auscultation - no rales, no rhonchi, no wheezes  CV: regular rates and rhythm, normal S1 S2, no S3 or S4 and no murmur, no click or rub -  Affect: Appropriate/mood-congruent           Labs:   Results from last visit:  Office Visit on 12/11/2017   Component Date Value Ref Range Status     Testosterone Total 12/11/2017 536  240 - 950 ng/dL Final    Comment: This test was developed and its performance characteristics determined by the   [facility],  [facility]. It has   not been cleared or approved by the FDA. The laboratory is regulated under   CLIA as qualified to perform high-complexity testing. This test is used for   clinical purposes. It should not be regarded as investigational or for   research.       Albumin 12/11/2017 4.4  3.8 - 5.0 mg/dL Final     Alkaline Phosphatase 12/11/2017 51.5  31.7 - 110.7 U/L Final     ALT 12/11/2017 10.1  0.0 - 45.0 U/L Final     AST 12/11/2017 16.5  0.0 - 55.0 U/L Final     Bilirubin Total 12/11/2017 1.0  0.2 - 1.3 mg/dL Final     Urea Nitrogen 12/11/2017 12.7  7.0 - 21.0 mg/dL Final     Calcium 12/11/2017 9.5  8.5 - 10.1 mg/dL Final     Chloride 12/11/2017 101.0  98.0 - 110.0 mmol/L Final     Carbon Dioxide " 12/11/2017 31.4  20.0 - 32.0 mmol/L Final     Creatinine 12/11/2017 0.7  0.7 - 1.3 mg/dL Final     Glucose 12/11/2017 89.6  70.0 - 99.0 mg'dL Final     Potassium 12/11/2017 4.0  3.3 - 4.5 mmol/dL Final     Sodium 12/11/2017 138.6  132.6 - 141.4 mmol/L Final     Protein Total 12/11/2017 7.2  6.8 - 8.8 g/dL Final     GFR Estimate 12/11/2017 >90  >60.0 mL/min/1.7 m2 Final     GFR Estimate If Black 12/11/2017 >90  >60.0 mL/min/1.7 m2 Final     Hemoglobin 12/11/2017 16.4  13.3 - 17.7 g/dL Final     Cholesterol 12/11/2017 119.7  0.0 - 200.0 mg/dL Final     Cholesterol/HDL Ratio 12/11/2017 2.7  0.0 - 5.0 Final     HDL Cholesterol 12/11/2017 44.4  >40.0 mg/dL Final     LDL Cholesterol Calculated 12/11/2017 67  0 - 129 mg/dL Final     Triglycerides 12/11/2017 42.0  0.0 - 150.0 mg/dL Final     VLDL Cholesterol 12/11/2017 8.4  7.0 - 32.0 mg/dL Final       Assessment and Plan     1. Gender dysphoria in adolescent and adult  Continue same dose of 100mg spironolactone daily and 8mg estradiol daily.  Will check labs in 1 month when patient's insurance becomes active.      2. Alopecia  Will start finasteride 1.25mg daily for hair loss.  Discuss risks and benefits and inability to donate blood.        Contraception:   abstinence  .   Counselled patient about controlled substances: No, not using    Follow up:  Follow up in 3 months.  Results by mychart  Questions were elicited and answered.     Won Ledesma MD

## 2018-01-02 NOTE — MR AVS SNAPSHOT
After Visit Summary   1/2/2018    Carlos Yu    MRN: [id number]           Patient Information     Date Of Birth          1990        Visit Information        Provider Department      1/2/2018 1:00 PM Won Simon MD [facility]        Today's Diagnoses     Gender dysphoria in adolescent and adult    -  1    Alopecia        Gender dysphoria in adult          Care Instructions    Here is the plan from today's visit      1. Gender dysphoria in adolescent and adult  Continue same dose of 100mg spironolactone daily and 8mg estradiol daily.  Will check labs in 1 month when patient's insurance becomes active.  Lab appointment in 1 month when insurance kicks in; follow up with me in 3-4 months    2. Alopecia  Will start finasteride 1.25mg daily for hair loss.  Discuss risks and benefits and inability to donate blood.      Please call or return to clinic if your symptoms don't go away.    Follow up plan  Please make a clinic appointment for follow up with me (WON SIMON) in 3-4  months for HRT follow up.    Thank you for coming to [facility] today.  Lab Testing:  **If you had lab testing today and your results are reassuring or normal they will be mailed to you or sent through [employer] within 7 days.   **If the lab tests need quick action we will call you with the results.  The phone number we will call with results is # [phone] (home) . If this is not the best number please call our clinic and change the number.  Medication Refills:  If you need any refills please call your pharmacy and they will contact us.   If you need to  your refill at a new pharmacy, please contact the new pharmacy directly. The new pharmacy will help you get your medications transferred faster.   Scheduling:  If you have any concerns about today's visit or wish to schedule another appointment please call our office during normal business hours [phone] (8-5:00  M-F)  If a referral was made to a [facility] and you don't get a call from central scheduling please call [phone].  If a Mammogram was ordered for you at [facility] call [phone] to schedule or change your appointment.  If you had an XRay/CT/Ultrasound/MRI ordered the number is [phone] to schedule or change your radiology appointment.   Medical Concerns:  If you have urgent medical concerns please call [phone] at any time of the day.    Some online resources for transgender health    [place] Transgender Health Coalition   Home to the [facility] at [address] [place] [address], [phone]. Also has support groups.  http://www.[place]transhealth.org/    Center of Excellence for Transgender Health  Increasing access to comprehensive, effective, and affirming healthcare services for trans and gender-variant communities.  http://www.transhealth.[facility]/trans?page=ajith-00-05    [facility]   Community-based non-profit committed to advancing the health & wellness of LGBTQ communities through research, education and advocacy. http://www.BCNX.org    [facility] Glossary of Transgender Terms  http://www.[employer].org/site/DocServer/Handout_7-C_Glossary_of_Gender_and_Transgender_Terms__fi.pdf?cqqQY=2611    Safe, gender-neutral public restrooms in the [facility]   http://www.[facility].org//index.php?option=com_content&task=view&id=12&Itemid    Trans Youth Support Network  For people 26 and under who identify as a trans or gender non-conforming person and want to be a part of an activist organization. Offers peer support, education and community building opportunities.   http://www.transyouthsupportnetwork.org/    Reclaim:  RECLAIM offers mental and integrative health services for LGBTQ youth and their families.  http://www.reclaim-lgbtyouth.org/    Gender Spectrum, for Trans Youth  Gender Spectrum provides education, training and support to help  create a gender sensitive and inclusive environment for all children and teens. http://www.genderspectrum.org/    [employer] FTM Resource Guide  Information on topics of interest to female-to-male (FTM, F2M) trans men, and their friends and loved ones.  http://ftmguide.org/    Also check out your local [employer] resource center!  LGBTQIA Services at [facility]: http://www.[facility]/lgbtqia/  LGBTQ@[facility] at [facility]: http://www.[facility]/multiculturallife/lgbtq/  GLBTA Programs office at  of : https://diversity.[facility].edu/glbta/            Thoughts of self harm?   Trans Lifeline can be reached at [phone].   This service is staffed by trans people 24/7.   For LGBT youth (ages 24 and younger) contemplating suicide, the [employer] Lifeline can be reached at [phone].       FEMINIZING Medications, Labs and f/u for Transwomen (MTF)Updated 6/2016  Drug Dose s/p Orchiectomy Typical patient Miscellaneous Details   Estradiol po  (Estrace) Start: 2mg pills daily at same time  Typical: 4-6mg     Max: 8mg Typical 1-2mg daily <41 yo, nonsmoker  good w/ daily meds Erectile dysfunction that is bothersome ? typical ED treatments.    Estradiol patch (Vivelle-Dot) apply 2x per wk  Start:  mcg/24 hr patch,   Typical: 100-200mcg daily  Max: 2 patches 2x/wk (200mcg/d) Typical: 37.5- 100mcg daily, changed 2x/wk Hx VTE, >40, smoker, transaminitis Patch falling off? Clean w/ EtOH first, apply & press x30sec. Baby oil to remove adhesive. Rx for 8 -16 patches/month.   Estradiol valerate (Delestrogen) Start: 5 mg IM weekly  Typical: 5-10 mg IM weekly  Max: 15-20 mg weekly 5mg IM weekly or 10 IM q2wk Ok w/ needles, >40, already on it Needles: 23-25gauge 1inch (if thigh) to 1.5inch (if glut), 1cc syringe, 18ga to draw up. Comes in 5ml bottles.   Estradiol cream  compounded Start: 50-100mcg to thighs daily  Typical: 100-200mcg   Max: 200 37.5-100mcg daily Ok w/ topical, paying out of pocket [facility]  on [address] online, in PLO gel   Conj. Estrogens (premarin)  Typical: 5mg daily. Avoid if possible due to higher risk VTE Get IMAN & ensure informed of risk Consult local expert before long term continuation.    Spironolactone Start: 50-100mg po daily  Max: 200 (MOST)-300mg  DC Bothersome hair, acne or erections AntiAndrogen. Check bmp when on max dose. Split dose if dizzy.   Finasteride Start: 2.5mg po daily  Max: 5-10mg daily DC unless for alopecia Bothersome hair, alopecia, or erections AntiAndrogen - ok to use in combo w/ casper if poor feminizing effect   ?Progesterone? RARELY USE 200mg prometrium HS or q3mo depo shots IF ASKED ONLY DC Wants breast dev, no depression/obesity Causes wt gain and a lot of depression. Data inconclusive on breast developmt.     LABS Hgb Fasting Glu Fasting Lipid Total testosterone LFT s Prolactin Estrogen Ultrasens.   On shots? Draw labs  Midcycle! ( - way btw shots) -Baseline  -3 mo after   -6mo after  -then annual  After=after start or dose change Or A1c  -Baseline  -3 mo after   -6mo after  -then annual -Baseline  -3 mo after   -6mo after  -then annual -Baseline  -3 mo after   -6mo after  -q1yr.   Goal level <50 -Baseline  -3 mo after   -6mo after  -q1yr. Abnl?RUQ US, hep serology -if Sx only, rare.  If >100 needs f/u, MRI Check ONLY if on injectable or not clinically feminizing. Level should be 100-200.  >200 ?VTE risk and dose must be ?immediately, f/u in 1 month   On casper -Baseline BUN/Cr/lytes, Again when MAX dose  -annually thereafter, careful with ACEi s Follow up lab abnormalities w/repeat labs  UDon t panic ?may need small adjustment in dose.      -PPsychotherapy: not required! REC if: no consolidated gender, not gender dysphoric by criteria, nonconforming & may not need hormones  -E-Estrogen therapy is CONTRAINDICATED IF estrogen dependent cancer  -G-Gender related effects: Breast development, redistribution of body fat, softening of skin, ?mood, testes  shrink, ?libido and ? fertility  -O-Other effects: ?VTE risk (steven if >40, smoker, obese, hx clots, po meds), ?Weight, emotional lability, ?lipids, ?erections, ?gallstones  **Upcoming surgery? HOLD ESTROGEN for 2-4 weeks prior to surgical procedure Adapted by Nasra Johns MD from:  Dept Public Health, [facility] Trans* Center of Excellence, [facility] Guidelines, WPATH SOC-7.  Updated 6/2016  Trans Guidelines [facility]         Effects and Expected Time Course of Feminizing Hormones    Effect Expected Onset Expected Maximum Effect   Body Fat redistribution 3-6 months 2-5 years   Decreased Muscle mass 3-6 months 1-2 years   Softening of skin/decreased oiliness 3-6 months Unknown   Decreased Libido 1-3 months 1-2 years   Decreased Spontaneous Erections 1-3 months 3-6 months   Male Sexual Dysfunction Variable Variable   Breast Growth 3-6 months 2-3 years   Decreased Testicular volume 3-6 months 2-3 years   Decreased sperm production Variable Variable   Thinning and slowed growth of body and facial hair  + 6-12 months >3 years   Male pattern baldness No regrowth, loss stops 1-3 months 1-2 years   +complete removal of male facial hair and body hair requires electrolysis, laser treatment or both            Follow-ups after your visit        Follow-up notes from your care team     Return in about 3 months (around 4/2/2018).      Future tests that were ordered for you today     Open Future Orders        Priority Expected Expires Ordered    Testosterone Total Routine  1/2/2019 1/2/2018    Comprehensive Metabolic Panel Routine  1/2/2019 1/2/2018    Hemoglobin (HGB) Routine  1/2/2019 1/2/2018    Lipid [place] Routine  1/2/2019 1/2/2018    Glucose Routine  1/2/2019 1/2/2018            Who to contact     Please call your clinic at [phone] to:    Ask questions about your health    Make or cancel appointments    Discuss your medicines    Learn about your test results    Speak to your doctor   If you have compliments or  concerns about an experience at your clinic, or if you wish to file a complaint, please contact [facility] Patient Relations at [phone] or email us at [email or web address]         Additional Information About Your Visit        [employer] Information     [employer] gives you secure access to your electronic health record. If you see a primary care provider, you can also send messages to your care team and make appointments. If you have questions, please call your primary care clinic.  If you do not have a primary care provider, please call [phone] and they will assist you.      [employer]anga is an electronic gateway that provides easy, online access to your medical records. With Xanga, you can request a clinic appointment, read your test results, renew a prescription or communicate with your care team.     To access your existing account, please contact your [facility] or call [phone] for assistance.        Care EveryWhere ID     This is your Care EveryWhere ID. This could be used by other organizations to access your [facility] medical records  [id number]        Your Vitals Were     Pulse Temperature Pulse Oximetry BMI (Body Mass Index)          66 97.9  F (36.6  C) (Oral) 99% 17.05 kg/m2         Blood Pressure from Last 3 Encounters:   01/02/18 126/86   12/11/17 128/85   11/14/17 121/73    Weight from Last 3 Encounters:   01/02/18 132 lb 12.8 oz (60.2 kg)   12/11/17 127 lb 9.6 oz (57.9 kg)   11/14/17 130 lb (59 kg)                 Today's Medication Changes          These changes are accurate as of: 1/2/18  1:44 PM.  If you have any questions, ask your nurse or doctor.               Start taking these medicines.        Dose/Directions    finasteride 1 MG tablet   Commonly known as:  PROPECIA   Used for:  Alopecia   Started by:  Won Ledesma MD        Dose:  1 mg   Take 1 tablet (1 mg) by mouth daily   Quantity:  90 tablet    Refills:  1            Where to get your medicines      These medications were sent to [facility] [place], [place] - [address], [facility] [address]     Phone:  [phone]     estradiol 2 MG tablet    finasteride 1 MG tablet                Primary Care Provider Office Phone # Fax #    Won Jose Ledesma [phone]       [facility] [address]   [facility] [address]        Equal Access to Services     ALIA SHEARER : Hadii aad ku hadasho Soomaali, waaxda luqadaha, qaybta kaalmada adeegyada, waxay idiin hayaan adeeg kharapaige laleah . So [facility] [phone].    ATENCIÓN: Si habla español, tiene a delgado disposición servicios gratuitos de asistencia lingüística. [facility] [phone].    We comply with applicable federal civil rights laws and [place] laws. We do not discriminate on the basis of race, color, national origin, age, disability, sex, sexual orientation, or gender identity.            Thank you!     Thank you for choosing [facility]  for your care. Our goal is always to provide you with excellent care. Hearing back from our patients is one way we can continue to improve our services. Please take a few minutes to complete the written survey that you may receive in the mail after your visit with us. Thank you!             Your Updated Medication List - Protect others around you: Learn how to safely use, store and throw away your medicines at www.disposemymeds.org.          This list is accurate as of: 1/2/18  1:44 PM.  Always use your most recent med list.                   Brand Name Dispense Instructions for use Diagnosis    estradiol 2 MG tablet    ESTRACE    360 tablet    Take 4 tablets (8 mg) by mouth daily    Gender dysphoria in adult       finasteride 1 MG tablet    PROPECIA    90 tablet    Take 1 tablet (1 mg) by mouth daily    Alopecia       spironolactone 100 MG tablet    ALDACTONE    60 tablet     Take 1 tablet (100 mg) by mouth daily    Gender dysphoria in adolescent and adult

## 2018-01-02 NOTE — PATIENT INSTRUCTIONS
Here is the plan from today's visit      1. Gender dysphoria in adolescent and adult  Continue same dose of 100mg spironolactone daily and 8mg estradiol daily.  Will check labs in 1 month when patient's insurance becomes active.  Lab appointment in 1 month when insurance kicks in; follow up with me in 3-4 months    2. Alopecia  Will start finasteride 1.25mg daily for hair loss.  Discuss risks and benefits and inability to donate blood.      Please call or return to clinic if your symptoms don't go away.    Follow up plan  Please make a clinic appointment for follow up with me (BARB SIMON) in 3-4  months for HRT follow up.    Thank you for coming to [facility] today.  Lab Testing:  **If you had lab testing today and your results are reassuring or normal they will be mailed to you or sent through [employer] within 7 days.   **If the lab tests need quick action we will call you with the results.  The phone number we will call with results is # [phone] (home) . If this is not the best number please call our clinic and change the number.  Medication Refills:  If you need any refills please call your pharmacy and they will contact us.   If you need to  your refill at a new pharmacy, please contact the new pharmacy directly. The new pharmacy will help you get your medications transferred faster.   Scheduling:  If you have any concerns about today's visit or wish to schedule another appointment please call our office during normal business hours [phone] (8-5:00 M-F)  If a referral was made to a [facility] and you don't get a call from central scheduling please call [phone].  If a Mammogram was ordered for you at [facility] call [phone] to schedule or change your appointment.  If you had an XRay/CT/Ultrasound/MRI ordered the number is [phone] to schedule or change your radiology appointment.   Medical Concerns:  If you have urgent medical concerns  please call [phone] at any time of the day.    Some online resources for transgender health    [place] Transgender Health Coalition   Home to the [facility] at [address] [facility], [phone]. Also has support groups.  http://www.[place]transhealth.org/    Center of Excellence for Transgender Health  Increasing access to comprehensive, effective, and affirming healthcare services for trans and gender-variant communities.  http://www.transhealth.[facility].edu/trans?page=aijth-00-05    [facility]   Community-based non-profit committed to advancing the health & wellness of LGBTQ communities through research, education and advocacy. http://www.[employer].org    [facility] [employer] Glossary of Transgender Terms  http://www.[employer].org/site/DocServer/Handout_7-C_Glossary_of_Gender_and_Transgender_Terms__fi.pdf?vkwVS=9429    Safe, gender-neutral public restrooms in the [facility]   http://www.[facility].org//index.php?option=com_content&task=view&id=12&Itemid    Trans Youth Support Network  For people 26 and under who identify as a trans or gender non-conforming person and want to be a part of an activist organization. Offers peer support, education and community building opportunities.   http://www.transyouthsupportnetwork.org/    Reclaim:  RECLAIM offers mental and integrative health services for LGBTQ youth and their families.  http://www.reclaim-lgbtyouth.org/    Gender Spectrum, for Trans Youth  Gender Spectrum provides education, training and support to help create a gender sensitive and inclusive environment for all children and teens. http://www.genderspectrum.org/    Tom s FTM Resource Guide  Information on topics of interest to female-to-male (FTM, F2M) trans men, and their friends and loved ones.  http://ftmguide.org/    Also check out your local [employer] queer resource center!  LGBTQIA Services at [facility]: http://www.[facility]/lgbtqia/  LGBTQ@[facility] at [facility]:  http://www.[facility]/multiculturallife/lgbtq/  GLBTA Programs office at [employer]: https://diversity.[facility].edu/glbta/            Thoughts of self harm?   Trans Lifeline can be reached at [phone].   This service is staffed by trans people 24/7.   For LGBT youth (ages 24 and younger) contemplating suicide, the [employer] Lifeline can be reached at [phone].       FEMINIZING Medications, Labs and f/u for Transwomen (MTF)Updated 6/2016  Drug Dose s/p Orchiectomy Typical patient Miscellaneous Details   Estradiol po  (Estrace) Start: 2mg pills daily at same time  Typical: 4-6mg     Max: 8mg Typical 1-2mg daily <39 yo, nonsmoker  good w/ daily meds Erectile dysfunction that is bothersome ? typical ED treatments.    Estradiol patch (Vivelle-Dot) apply 2x per wk  Start:  mcg/24 hr patch,   Typical: 100-200mcg daily  Max: 2 patches 2x/wk (200mcg/d) Typical: 37.5- 100mcg daily, changed 2x/wk Hx VTE, >40, smoker, transaminitis Patch falling off? Clean w/ EtOH first, apply & press x30sec. Baby oil to remove adhesive. Rx for 8 -16 patches/month.   Estradiol valerate (Delestrogen) Start: 5 mg IM weekly  Typical: 5-10 mg IM weekly  Max: 15-20 mg weekly 5mg IM weekly or 10 IM q2wk Ok w/ needles, >40, already on it Needles: 23-25gauge 1inch (if thigh) to 1.5inch (if glut), 1cc syringe, 18ga to draw up. Comes in 5ml bottles.   Estradiol cream  compounded Start: 50-100mcg to thighs daily  Typical: 100-200mcg   Max: 200 37.5-100mcg daily Ok w/ topical, paying out of pocket [facility] [place] on [address] online, in PLO gel   Conj. Estrogens (premarin)  Typical: 5mg daily. Avoid if possible due to higher risk VTE Get IMAN & ensure informed of risk Consult local expert before long term continuation.    Spironolactone Start: 50-100mg po daily  Max: 200 (MOST)-300mg  DC Bothersome hair, acne or erections AntiAndrogen. Check bmp when on max dose. Split dose if dizzy.   Finasteride Start: 2.5mg po  daily  Max: 5-10mg daily DC unless for alopecia Bothersome hair, alopecia, or erections AntiAndrogen - ok to use in combo w/ casper if poor feminizing effect   ?Progesterone? RARELY USE 200mg prometrium HS or q3mo depo shots IF ASKED ONLY DC Wants breast dev, no depression/obesity Causes wt gain and a lot of depression. Data inconclusive on breast developmt.     LABS Hgb Fasting Glu Fasting Lipid Total testosterone LFT s Prolactin Estrogen Ultrasens.   On shots? Draw labs  Midcycle! ( - way btw shots) -Baseline  -3 mo after   -6mo after  -then annual  After=after start or dose change Or A1c  -Baseline  -3 mo after   -6mo after  -then annual -Baseline  -3 mo after   -6mo after  -then annual -Baseline  -3 mo after   -6mo after  -q1yr.   Goal level <50 -Baseline  -3 mo after   -6mo after  -q1yr. Abnl?RUQ US, hep serology -if Sx only, rare.  If >100 needs f/u, MRI Check ONLY if on injectable or not clinically feminizing. Level should be 100-200.  >200 ?VTE risk and dose must be ?immediately, f/u in 1 month   On casper -Baseline BUN/Cr/lytes, Again when MAX dose  -annually thereafter, careful with ACEi s Follow up lab abnormalities w/repeat labs  UDon t panic ?may need small adjustment in dose.      -PPsychotherapy: not required! REC if: no consolidated gender, not gender dysphoric by criteria, nonconforming & may not need hormones  -E-Estrogen therapy is CONTRAINDICATED IF estrogen dependent cancer  -G-Gender related effects: Breast development, redistribution of body fat, softening of skin, ?mood, testes shrink, ?libido and ? fertility  -O-Other effects: ?VTE risk (steven if >40, smoker, obese, hx clots, po meds), ?Weight, emotional lability, ?lipids, ?erections, ?gallstones  **Upcoming surgery? HOLD ESTROGEN for 2-4 weeks prior to surgical procedure Adapted by Nasra Johns MD from: [place] Dept Public Health, [facility] Trans* Center of Excellence, [facility] Guidelines, WPATH SOC-7.  Updated 6/2016  Trans Guidelines  [facility]         Effects and Expected Time Course of Feminizing Hormones    Effect Expected Onset Expected Maximum Effect   Body Fat redistribution 3-6 months 2-5 years   Decreased Muscle mass 3-6 months 1-2 years   Softening of skin/decreased oiliness 3-6 months Unknown   Decreased Libido 1-3 months 1-2 years   Decreased Spontaneous Erections 1-3 months 3-6 months   Male Sexual Dysfunction Variable Variable   Breast Growth 3-6 months 2-3 years   Decreased Testicular volume 3-6 months 2-3 years   Decreased sperm production Variable Variable   Thinning and slowed growth of body and facial hair  + 6-12 months >3 years   Male pattern baldness No regrowth, loss stops 1-3 months 1-2 years   +complete removal of male facial hair and body hair requires electrolysis, laser treatment or both

## 2018-02-05 DIAGNOSIS — F64.0 GENDER DYSPHORIA IN ADOLESCENT AND ADULT: ICD-10-CM

## 2018-02-05 LAB
ALBUMIN SERPL-MCNC: 4.4 MG/DL (ref 3.8–5)
ALP SERPL-CCNC: 50.5 U/L (ref 31.7–110.7)
ALT SERPL-CCNC: 7 U/L (ref 0–45)
AST SERPL-CCNC: 19.4 U/L (ref 0–55)
BILIRUB SERPL-MCNC: 0.5 MG/DL (ref 0.2–1.3)
BUN SERPL-MCNC: 6.2 MG/DL (ref 7–21)
CALCIUM SERPL-MCNC: 9.4 MG/DL (ref 8.5–10.1)
CHLORIDE SERPLBLD-SCNC: 101.4 MMOL/L (ref 98–110)
CHOLEST SERPL-MCNC: 104.3 MG/DL (ref 0–200)
CHOLEST/HDLC SERPL: 2.2 {RATIO} (ref 0–5)
CO2 SERPL-SCNC: 28.7 MMOL/L (ref 20–32)
CREAT SERPL-MCNC: 0.7 MG/DL (ref 0.7–1.3)
GFR SERPL CREATININE-BSD FRML MDRD: >90 ML/MIN/1.7 M2
GLUCOSE SERPL-MCNC: 90 MG'DL (ref 70–99)
HDLC SERPL-MCNC: 47 MG/DL
HEMOGLOBIN: 15.5 G/DL (ref 13.3–17.7)
LDLC SERPL CALC-MCNC: 48 MG/DL (ref 0–129)
POTASSIUM SERPL-SCNC: 4.4 MMOL/DL (ref 3.3–4.5)
PROT SERPL-MCNC: 7.1 G/DL (ref 6.8–8.8)
SODIUM SERPL-SCNC: 137.2 MMOL/L (ref 132.6–141.4)
TRIGL SERPL-MCNC: 45 MG/DL (ref 0–150)
VLDL CHOLESTEROL: 9 MG/DL (ref 7–32)

## 2018-02-07 LAB — TESTOST SERPL-MCNC: 314 NG/DL (ref 240–950)

## 2018-04-10 ENCOUNTER — OFFICE VISIT (OUTPATIENT)
Dept: FAMILY MEDICINE | Facility: CLINIC | Age: 28
End: 2018-04-10
Payer: COMMERCIAL

## 2018-04-10 VITALS
BODY MASS INDEX: 16.69 KG/M2 | SYSTOLIC BLOOD PRESSURE: 115 MMHG | HEART RATE: 71 BPM | TEMPERATURE: 97.8 F | WEIGHT: 130 LBS | DIASTOLIC BLOOD PRESSURE: 74 MMHG | RESPIRATION RATE: 16 BRPM | OXYGEN SATURATION: 98 %

## 2018-04-10 DIAGNOSIS — G47.20 CIRCADIAN DYSREGULATION: ICD-10-CM

## 2018-04-10 DIAGNOSIS — F64.0 GENDER DYSPHORIA IN ADOLESCENT AND ADULT: Primary | ICD-10-CM

## 2018-04-10 RX ORDER — SPIRONOLACTONE 100 MG/1
200 TABLET, FILM COATED ORAL DAILY
Qty: 60 TABLET | Refills: 5 | Status: SHIPPED | OUTPATIENT
Start: 2018-04-10 | End: 2018-10-23

## 2018-04-10 RX ORDER — LANOLIN ALCOHOL/MO/W.PET/CERES
3 CREAM (GRAM) TOPICAL
COMMUNITY
Start: 2018-04-10

## 2018-04-10 NOTE — PROGRESS NOTES
Preceptor Attestation:   Patient seen, evaluated and discussed with the resident. I have verified the content of the note, which accurately reflects my assessment of the patient and the plan of care.   Supervising Physician:  Christophe Hernandez MD

## 2018-04-10 NOTE — PROGRESS NOTES
LENARD Gonzalez is a 28 year old individual that uses pronouns She/Her/Hers/Herself that presents today for follow up of:  feminizing hormone therapy. Gender identity: Female    Any special concerns today?  Poor sleep.  Falls asleep easily, but then wakes up after ~4 hours and is unable to fall back asleep easily.  She works nights.  Typically she goes to bathroom and eats something because she is usually quite hungry.  Then she is up for ~2-3 hours then falls back asleep.  She sometimes does not eat well when she is awake because of schedule but she feels that she has improved her PO intake while awake.  Sometimes pre-bed meal is not substantial.    On hormones?  YES      Due for labs?  No      +++ Refills of meds needed?  No  ---    Past Surgical History:   Procedure Laterality Date     NO HISTORY OF SURGERY         Patient Active Problem List   Diagnosis     Gender dysphoria in adolescent and adult     Mild intermittent asthma     Alopecia       Current Outpatient Prescriptions   Medication Sig Dispense Refill     finasteride (PROPECIA) 1 MG tablet Take 1 tablet (1 mg) by mouth daily 90 tablet 1     estradiol (ESTRACE) 2 MG tablet Take 4 tablets (8 mg) by mouth daily 360 tablet 1     spironolactone (ALDACTONE) 100 MG tablet Take 1 tablet (100 mg) by mouth daily 60 tablet 5       History   Smoking Status     Never Smoker   Smokeless Tobacco     Never Used        No Known Allergies    Problem, Medication and Allergy Lists were reviewed and updated if needed..         Review of Systems:      General    Fat redistribution: no    Weight change: no HEENT    Voice change: no     Cardiovascular (CV)    Chest Pains: no    Shortness of breath: no Chest    Decreased exercise tolerance:  no    Breast changes/development: YES     Gastrointestinal (GI)    Abdominal pain: no    Change in appetite: increased Skin    Acne or oily skin: no    Change in hair: no     Genitourinary ()    Abnormal vaginal bleeding: not  "applicable     Decreased spontaneous erections: YES    Change in libido: no    New sexual partners: no Musculoskeletal    Leg pain or swelling: no     Psychiatric (Psych)    Depression: no    Anxiety/Panic: no    Mood:  \"good\"                    Physical Exam:     Vitals:    04/10/18 0824   BP: 115/74   Pulse: 71   Resp: 16   Temp: 97.8  F (36.6  C)   TempSrc: Oral   SpO2: 98%   Weight: 130 lb (59 kg)     BMI= Body mass index is 16.69 kg/(m^2).   Wt Readings from Last 10 Encounters:   04/10/18 130 lb (59 kg)   01/02/18 132 lb 12.8 oz (60.2 kg)   12/11/17 127 lb 9.6 oz (57.9 kg)   11/14/17 130 lb (59 kg)   10/18/17 130 lb (59 kg)   09/14/17 129 lb (58.5 kg)   08/16/17 126 lb 6.4 oz (57.3 kg)   07/21/17 125 lb (56.7 kg)     Appearance: Both appearance and dress  General: Alert and oriented, in no acute distress.  Skin: Warm and dry, no abnormalities noted.  Eyes: Extra-ocular muscles intact, pupils equal and reactive.  ENT: Speech intact, nasal passages open, no hearing impairment noted  Neck: Supple, no swelling noted  Respiratory: No respiratory difficulty noted.  Neuro: Gait and station normal, comprehension intact. Gross and fine motor skills intact.   Psychiatric: Mood and affect appear normal.   Extremities: Warm  Affect: Appropriate/mood-congruent           Labs:   Results from last visit:  Orders Only on 02/05/2018   Component Date Value Ref Range Status     Testosterone Total 02/05/2018 314  240 - 950 ng/dL Final    Comment: This test was developed and its performance characteristics determined by the   [facility],  Special Chemistry Laboratory. It has   not been cleared or approved by the FDA. The laboratory is regulated under   CLIA as qualified to perform high-complexity testing. This test is used for   clinical purposes. It should not be regarded as investigational or for   research.       Albumin 02/05/2018 4.4  3.8 - 5.0 mg/dL Final     Alkaline Phosphatase 02/05/2018 50.5  " 31.7 - 110.7 U/L Final     ALT 02/05/2018 7.0  0.0 - 45.0 U/L Final     AST 02/05/2018 19.4  0.0 - 55.0 U/L Final     Bilirubin Total 02/05/2018 0.5  0.2 - 1.3 mg/dL Final     Urea Nitrogen 02/05/2018 6.2* 7.0 - 21.0 mg/dL Final     Calcium 02/05/2018 9.4  8.5 - 10.1 mg/dL Final     Chloride 02/05/2018 101.4  98.0 - 110.0 mmol/L Final     Carbon Dioxide 02/05/2018 28.7  20.0 - 32.0 mmol/L Final     Creatinine 02/05/2018 0.7  0.7 - 1.3 mg/dL Final     Glucose 02/05/2018 90.0  70.0 - 99.0 mg'dL Final     Potassium 02/05/2018 4.4  3.3 - 4.5 mmol/dL Final     Sodium 02/05/2018 137.2  132.6 - 141.4 mmol/L Final     Protein Total 02/05/2018 7.1  6.8 - 8.8 g/dL Final     GFR Estimate 02/05/2018 >90  >60.0 mL/min/1.7 m2 Final     GFR Estimate If Black 02/05/2018 >90  >60.0 mL/min/1.7 m2 Final     Hemoglobin 02/05/2018 15.5  13.3 - 17.7 g/dL Final     Cholesterol 02/05/2018 104.3  0.0 - 200.0 mg/dL Final     Cholesterol/HDL Ratio 02/05/2018 2.2  0.0 - 5.0 Final     HDL Cholesterol 02/05/2018 47.0  >40.0 mg/dL Final     LDL Cholesterol Calculated 02/05/2018 48  0 - 129 mg/dL Final     Triglycerides 02/05/2018 45.0  0.0 - 150.0 mg/dL Final     VLDL Cholesterol 02/05/2018 9.0  7.0 - 32.0 mg/dL Final       Assessment and Plan     1. Gender dysphoria in adolescent and adult  Patient is on max dose of oral estradiol without adequate suppression of testosterone.  Will increase spironolactone from 100 mg daily to 200 mg daily (increased to 150 mg daily for 1 week then increase to 200mg daily).  Check electrolytes in 2 weeks given increasing dose of spironolactone.  Check testosterone level and electrolytes in 6 weeks.  Follow-up for HRT follow-up in 3 months or sooner as needed.  If still inadequate suppression, will consider going up to 300 mg Spironolactone or consulting gender support clinic at [facility].    - Testosterone Total; Future  - Comprehensive Metabolic Panel; Future  - spironolactone (ALDACTONE) 100 MG tablet; Take 2  tablets (200 mg) by mouth daily  Dispense: 60 tablet; Refill: 5  - Comprehensive Metabolic Panel ([facility]); Future    2. Circadian dysregulation  Patient has sleep disturbances which are likely secondary to disrupted wake/sleep cycles.  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  No signs of trupti causing sleep disturbances.  It is possible that increased appetite and inadequate total caloric intake during waking hours is contributing to patient's sleep-wake cycle as she finds that food is always needed to help her go back to sleep.  It could also be that she has turned to food due to its sedating nature as a learned behavior.  Discussed sleep hygiene, consistent schedules, healthy eating habits during times where she is awake, and possible use of melatonin as pharmacologic sleep aid.    Contraception:   abstinence  .   Counselled patient about controlled substances: No, no concerns     Follow up:  Follow up in 2 weeks for electrolyte check, follow up in 6 weeks for testosterone and electrolyte recheck, follow up for HRT follow up in 3 months  .  [facility]  Questions were elicited and answered.     Won Ledesma MD

## 2018-04-10 NOTE — MR AVS SNAPSHOT
After Visit Summary   4/10/2018    Carlos Yu    MRN: [id number]           Patient Information     Date Of Birth          1990        Visit Information        Provider Department      4/10/2018 8:20 AM Won Simon MD [facility]        Today's Diagnoses     Gender dysphoria in adolescent and adult    -  1    Circadian dysregulation          Care Instructions    Here is the plan from today's visit    1. Gender dysphoria in adolescent and adult  Testosteone not well suppressed on 8mg estradiol and 100mg spironolactone.  Will increased sprinolactone to 200mg daily (150 daily x 1 week then increase to 200mg daily).  Recheck electrolytes in 2 weeks.  Recheck electrolytes and testosterone level in 6 weeks    - Testosterone Total; Future  - Comprehensive Metabolic Panel; Future  - Hemoglobin (HGB); Future  - Lipid [place]; Future  - Glucose; Future  - spironolactone (ALDACTONE) 100 MG tablet; Take 2 tablets (200 mg) by mouth daily  Dispense: 60 tablet; Refill: 5    2. Circadian dysregulation  Discussed sleep hygeine.  Consider melatonin 3mg 1-2 hours before sleep to help regulate sleep/wake cycle.      Please call or return to clinic if your symptoms don't go away.    Follow up plan  Please make a clinic appointment for follow up with me (WON SIMON) in 3 months  Thank you for coming to [facility] today.  Lab Testing:  **If you had lab testing today and your results are reassuring or normal they will be mailed to you or sent through "[employer]," within 7 days.   **If the lab tests need quick action we will call you with the results.  The phone number we will call with results is # [phone] (home) . If this is not the best number please call our clinic and change the number.  Medication Refills:  If you need any refills please call your pharmacy and they will contact us.   If you need to  your refill at a new pharmacy, please contact the new  pharmacy directly. The new pharmacy will help you get your medications transferred faster.   Scheduling:  If you have any concerns about today's visit or wish to schedule another appointment please call our office during normal business hours [phone] (8-5:00 M-F)  If a referral was made to a [facility] and you don't get a call from central scheduling please call [phone].  If a Mammogram was ordered for you at [facility] call [phone] to schedule or change your appointment.  If you had an XRay/CT/Ultrasound/MRI ordered the number is [phone] to schedule or change your radiology appointment.   Medical Concerns:  If you have urgent medical concerns please call [phone] at any time of the day.    Won Ledesma MD                             Improving Sleep Through Behavior Change                                                   Stimulus Control Procedures      Go to Bed Only When You Are Sleepy: The longer you are in bed, the more the bed is associated with a place to be awake instead of asleep. Delay bedtime until sleepy.     Get Out of Bed When You Can t Fall Asleep or Go Back to Sleep in About 15 Minutes: Get out of bed if you don t fall asleep fairly soon. Return to bed only when you are sleepy. When you feel sleepy, return to bed. The goal is to reconnect your bed with being asleep.    Use the Bed for Sleep and Sex Only: Do not watch TV, listen to the radio, eat, or read in your bed or bedroom.                                                       Sleep Hygiene Guidelines    Caffeine: Avoid caffeine 6 to 8 hours before bedtime. Caffeine disturbs sleep. Thus, drinking caffeinated beverages should be avoided near bedtime.      Nicotine: Avoid nicotine before bedtime. Nicotine can keep you awake. Avoid tobacco near bedtime and during the night.       Alcohol: Avoid alcohol after dinner. Alcohol often promotes the onset of sleep, but interrupts your natural  sleep pattern. Do not consume it any closer than 4 hours before going to bed.       Sleeping Pills: Sleep medications are effective only temporarily. Sleep medications lose their effectiveness in about 2 to 4 weeks when taken regularly. Over time, sleeping pills actually can make sleep problems worse; withdrawal from the medication can lead to an insomnia rebound. Keep use of sleeping pills infrequent, but don t worry if you need to use one on an occasional basis.      Regular Exercise: Do not exercise within 2 hours of bedtime as it may elevate nervous system activity and interfere with your ability to fall asleep       Bedroom Environment: Your bedroom should have a moderate temperature and be quiet and dark. Noises can be masked with background white noise (e.g., the noise of a fan) or with earplugs. Bedrooms may be darkened with blackout shades, or sleep masks can be worn.       Eating: A light bedtime snack, such a glass of warm milk, cheese, or a bowl of cereal can promote sleep. Avoid snacks in the middle of the night because awakening may become associated with hunger.       Avoid Naps: The sleep you obtain during the day takes away from the amount of sleep you need that night. If you must nap, schedule it before 3:00 p.m. Don t sleep more than 15 to 30 minutes.      Allow Yourself at Least an Hour Before Bedtime to Unwind: Find what works for you to wind down, and perhaps give yourself an hour to do so.       Regular Sleep Schedule: Keep a regular time each day, 7 days a week, to get out of bed. Keeping a regular waking time helps set your circadian rhythm so that your body learns to sleep at the desired time.      Set a Reasonable Bedtime and Arising Time and Stick to Them: Set the alarm clock and get out of bed at the same time each morning, weekdays and weekends, regardless of your bedtime or the amount of sleep you obtained on the previous night. This guideline is designed to regulate your internal  biological clock and reset your sleep-wake rhythm.    ARMAND Estrella., ANILA Mccormick., Meeta, M. S., & MARY Barnett. (2009). Integrated Behavioral [place] in Primary Care: Step-by-Step Guidance for Assessment and Intervention. American Psychological Association.             FEMINIZING Medications, Labs and f/u for Transwomen (MTF)Updated 6/2016  Drug Dose s/p Orchiectomy Typical patient Miscellaneous Details   Estradiol po  (Estrace) Start: 2mg pills daily at same time  Typical: 4-6mg     Max: 8mg Typical 1-2mg daily <41 yo, nonsmoker  good w/ daily meds Erectile dysfunction that is bothersome ? typical ED treatments.    Estradiol patch (Vivelle-Dot) apply 2x per wk  Start:  mcg/24 hr patch,   Typical: 100-200mcg daily  Max: 2 patches 2x/wk (200mcg/d) Typical: 37.5- 100mcg daily, changed 2x/wk Hx VTE, >40, smoker, transaminitis Patch falling off? Clean w/ EtOH first, apply & press x30sec. Baby oil to remove adhesive. Rx for 8 -16 patches/month.   Estradiol valerate (Delestrogen) Start: 5 mg IM weekly  Typical: 5-10 mg IM weekly  Max: 15-20 mg weekly 5mg IM weekly or 10 IM q2wk Ok w/ needles, >40, already on it Needles: 23-25gauge 1inch (if thigh) to 1.5inch (if glut), 1cc syringe, 18ga to draw up. Comes in 5ml bottles.   Estradiol cream  compounded Start: 50-100mcg to thighs daily  Typical: 100-200mcg   Max: 200 37.5-100mcg daily Ok w/ topical, paying out of pocket Pharm: [facility] [address] online, in PLO gel   Conj. Estrogens (premarin)  Typical: 5mg daily. Avoid if possible due to higher risk VTE Get IMAN & ensure informed of risk Consult local expert before long term continuation.    Spironolactone Start: 50-100mg po daily  Max: 200 (MOST)-300mg  DC Bothersome hair, acne or erections AntiAndrogen. Check bmp when on max dose. Split dose if dizzy.   Finasteride Start: 2.5mg po daily  Max: 5-10mg daily DC unless for alopecia Bothersome hair, alopecia, or erections AntiAndrogen - ok to  use in combo w/ casper if poor feminizing effect   ?Progesterone? RARELY USE 200mg prometrium HS or q3mo depo shots IF ASKED ONLY DC Wants breast dev, no depression/obesity Causes wt gain and a lot of depression. Data inconclusive on breast developmt.     LABS Hgb Fasting Glu Fasting Lipid Total testosterone LFT s Prolactin Estrogen Ultrasens.   On shots? Draw labs  Midcycle! ( - way btw shots) -Baseline  -3 mo after   -6mo after  -then annual  After=after start or dose change Or A1c  -Baseline  -3 mo after   -6mo after  -then annual -Baseline  -3 mo after   -6mo after  -then annual -Baseline  -3 mo after   -6mo after  -q1yr.   Goal level <50 -Baseline  -3 mo after   -6mo after  -q1yr. Abnl?RUQ US, hep serology -if Sx only, rare.  If >100 needs f/u, MRI Check ONLY if on injectable or not clinically feminizing. Level should be 100-200.  >200 ?VTE risk and dose must be ?immediately, f/u in 1 month   On casper -Baseline BUN/Cr/lytes, Again when MAX dose  -annually thereafter, careful with ACEi s Follow up lab abnormalities w/repeat labs  UDon t panic ?may need small adjustment in dose.      -PPsychotherapy: not required! REC if: no consolidated gender, not gender dysphoric by criteria, nonconforming & may not need hormones  -E-Estrogen therapy is CONTRAINDICATED IF estrogen dependent cancer  -G-Gender related effects: Breast development, redistribution of body fat, softening of skin, ?mood, testes shrink, ?libido and ? fertility  -O-Other effects: ?VTE risk (steven if >40, smoker, obese, hx clots, po meds), ?Weight, emotional lability, ?lipids, ?erections, ?gallstones  **Upcoming surgery? HOLD ESTROGEN for 2-4 weeks prior to surgical procedure Adapted by Nasra Johns MD from:  Dept Public Health, [facility], [facility] Guidelines, [facility] SOC-7.  Updated 6/2016  Trans Guidelines [facility]                   Follow-ups after your visit        Future tests that were ordered for you today     Open  Future Orders        Priority Expected Expires Ordered    Testosterone Total Routine  4/10/2019 4/10/2018    Comprehensive Metabolic Panel Routine  4/10/2019 4/10/2018    Hemoglobin (HGB) Routine  4/10/2019 4/10/2018    Lipid [employer] Routine  4/10/2019 4/10/2018    Glucose Routine  4/10/2019 4/10/2018            Who to contact     Please call your clinic at [phone] to:    Ask questions about your health    Make or cancel appointments    Discuss your medicines    Learn about your test results    Speak to your doctor            Additional Information About Your Visit        [employer] Information     [employer] gives you secure access to your electronic health record. If you see a primary care provider, you can also send messages to your care team and make appointments. If you have questions, please call your primary care clinic.  If you do not have a primary care provider, please call [phone] and they will assist you.      [employer] is an electronic gateway that provides easy, online access to your medical records. With [employer], you can request a clinic appointment, read your test results, renew a prescription or communicate with your care team.     To access your existing account, please contact your [facility] or call [phone] for assistance.        Care EveryWhere ID     This is your Care EveryWhere ID. This could be used by other organizations to access your [facility] medical records  [id number]        Your Vitals Were     Pulse Temperature Respirations Pulse Oximetry BMI (Body Mass Index)       71 97.8  F (36.6  C) (Oral) 16 98% 16.69 kg/m2        Blood Pressure from Last 3 Encounters:   04/10/18 115/74   01/02/18 126/86   12/11/17 128/85    Weight from Last 3 Encounters:   04/10/18 130 lb (59 kg)   01/02/18 132 lb 12.8 oz (60.2 kg)   12/11/17 127 lb 9.6 oz (57.9 kg)                 Today's Medication Changes          These changes are accurate as of 4/10/18  9:14 AM.  If  you have any questions, ask your nurse or doctor.               These medicines have changed or have updated prescriptions.        Dose/Directions    spironolactone 100 MG tablet   Commonly known as:  ALDACTONE   This may have changed:  how much to take   Used for:  Gender dysphoria in adolescent and adult   Changed by:  Won Ledesma MD        Dose:  200 mg   Take 2 tablets (200 mg) by mouth daily   Quantity:  60 tablet   Refills:  5            Where to get your medicines      These medications were sent to [facility] [place], [place] - [address], [facility] [address]     Phone:  [phone]     spironolactone 100 MG tablet                Primary Care Provider Office Phone # Fax #    Won Ledesma [phone]       [facility] [address]   [facility] [address]        Equal Access to Services     ALIA SHEARER : Hadii blayne brand hadasho Sotreva, waaxda luqadaha, qaybta kaalmada adeegyada, papa miramontes . So [facility] [phone].    ATENCIÓN: Si habla español, tiene a delgado disposición servicios gratuitos de asistencia lingüística. [facility] [phone].    We comply with applicable federal civil rights laws and [place] laws. We do not discriminate on the basis of race, color, national origin, age, disability, sex, sexual orientation, or gender identity.            Thank you!     Thank you for choosing \[facility]\"" [facility]  for your care. Our goal is always to provide you with excellent care. Hearing back from our patients is one way we can continue to improve our services. Please take a few minutes to complete the written survey that you may receive in the mail after your visit with us. Thank you!             Your Updated Medication List - Protect others around you: Learn how to safely use, store and throw away your medicines at www.disposemymeds.org.          This list is accurate as of  4/10/18  9:14 AM.  Always use your most recent med list.                   Brand Name Dispense Instructions for use Diagnosis    estradiol 2 MG tablet    ESTRACE    360 tablet    Take 4 tablets (8 mg) by mouth daily    Gender dysphoria in adult       finasteride 1 MG tablet    PROPECIA    90 tablet    Take 1 tablet (1 mg) by mouth daily    Alopecia       melatonin 3 MG tablet      Take 1 tablet (3 mg) by mouth nightly as needed for sleep        spironolactone 100 MG tablet    ALDACTONE    60 tablet    Take 2 tablets (200 mg) by mouth daily    Gender dysphoria in adolescent and adult

## 2018-04-10 NOTE — PATIENT INSTRUCTIONS
Here is the plan from today's visit    1. Gender dysphoria in adolescent and adult  Testosteone not well suppressed on 8mg estradiol and 100mg spironolactone.  Will increased sprinolactone to 200mg daily (150 daily x 1 week then increase to 200mg daily).  Recheck electrolytes in 2 weeks.  Recheck electrolytes and testosterone level in 6 weeks    - Testosterone Total; Future  - Comprehensive Metabolic Panel; Future  - Hemoglobin (HGB); Future  - Lipid Cascade; Future  - Glucose; Future  - spironolactone (ALDACTONE) 100 MG tablet; Take 2 tablets (200 mg) by mouth daily  Dispense: 60 tablet; Refill: 5    2. Circadian dysregulation  Discussed sleep hygeine.  Consider melatonin 3mg 1-2 hours before sleep to help regulate sleep/wake cycle.      Please call or return to clinic if your symptoms don't go away.    Follow up plan  Please make a clinic appointment for follow up with me (BARB SIMON) in 3 months  Thank you for coming to [facility] today.  Lab Testing:  **If you had lab testing today and your results are reassuring or normal they will be mailed to you or sent through [employer] within 7 days.   **If the lab tests need quick action we will call you with the results.  The phone number we will call with results is # [phone] (home) . If this is not the best number please call our clinic and change the number.  Medication Refills:  If you need any refills please call your pharmacy and they will contact us.   If you need to  your refill at a new pharmacy, please contact the new pharmacy directly. The new pharmacy will help you get your medications transferred faster.   Scheduling:  If you have any concerns about today's visit or wish to schedule another appointment please call our office during normal business hours [phone] (8-5:00 M-F)  If a referral was made to a [facility] and you don't get a call from central scheduling please call [phone].  If a Mammogram  was ordered for you at [facility] call [phone] to schedule or change your appointment.  If you had an XRay/CT/Ultrasound/MRI ordered the number is [phone] to schedule or change your radiology appointment.   Medical Concerns:  If you have urgent medical concerns please call [phone] at any time of the day.    Won Ledesma MD                             Improving Sleep Through Behavior Change                                                   Stimulus Control Procedures      Go to Bed Only When You Are Sleepy: The longer you are in bed, the more the bed is associated with a place to be awake instead of asleep. Delay bedtime until sleepy.     Get Out of Bed When You Can t Fall Asleep or Go Back to Sleep in About 15 Minutes: Get out of bed if you don t fall asleep fairly soon. Return to bed only when you are sleepy. When you feel sleepy, return to bed. The goal is to reconnect your bed with being asleep.    Use the Bed for Sleep and Sex Only: Do not watch TV, listen to the radio, eat, or read in your bed or bedroom.                                                       Sleep Hygiene Guidelines    Caffeine: Avoid caffeine 6 to 8 hours before bedtime. Caffeine disturbs sleep. Thus, drinking caffeinated beverages should be avoided near bedtime.      Nicotine: Avoid nicotine before bedtime. Nicotine can keep you awake. Avoid tobacco near bedtime and during the night.       Alcohol: Avoid alcohol after dinner. Alcohol often promotes the onset of sleep, but interrupts your natural sleep pattern. Do not consume it any closer than 4 hours before going to bed.       Sleeping Pills: Sleep medications are effective only temporarily. Sleep medications lose their effectiveness in about 2 to 4 weeks when taken regularly. Over time, sleeping pills actually can make sleep problems worse; withdrawal from the medication can lead to an insomnia rebound. Keep use of sleeping pills infrequent, but don t worry if  you need to use one on an occasional basis.      Regular Exercise: Do not exercise within 2 hours of bedtime as it may elevate nervous system activity and interfere with your ability to fall asleep       Bedroom Environment: Your bedroom should have a moderate temperature and be quiet and dark. Noises can be masked with background white noise (e.g., the noise of a fan) or with earplugs. Bedrooms may be darkened with blackout shades, or sleep masks can be worn.       Eating: A light bedtime snack, such a glass of warm milk, cheese, or a bowl of cereal can promote sleep. Avoid snacks in the middle of the night because awakening may become associated with hunger.       Avoid Naps: The sleep you obtain during the day takes away from the amount of sleep you need that night. If you must nap, schedule it before 3:00 p.m. Don t sleep more than 15 to 30 minutes.      Allow Yourself at Least an Hour Before Bedtime to Unwind: Find what works for you to wind down, and perhaps give yourself an hour to do so.       Regular Sleep Schedule: Keep a regular time each day, 7 days a week, to get out of bed. Keeping a regular waking time helps set your circadian rhythm so that your body learns to sleep at the desired time.      Set a Reasonable Bedtime and Arising Time and Stick to Them: Set the alarm clock and get out of bed at the same time each morning, weekdays and weekends, regardless of your bedtime or the amount of sleep you obtained on the previous night. This guideline is designed to regulate your internal biological clock and reset your sleep-wake rhythm.    ARMAND Estrella., ANILA Mccormick., Billyt, M. S., & MARY Barnett. (2009). Integrated Behavioral [place] in Primary Care: Step-by-Step Guidance for Assessment and Intervention. American Psychological Association.             FEMINIZING Medications, Labs and f/u for Transwomen (MTF)Updated 6/2016  Drug Dose s/p Orchiectomy Typical patient Miscellaneous Details   Estradiol  po  (Estrace) Start: 2mg pills daily at same time  Typical: 4-6mg     Max: 8mg Typical 1-2mg daily <39 yo, nonsmoker  good w/ daily meds Erectile dysfunction that is bothersome ? typical ED treatments.    Estradiol patch (Vivelle-Dot) apply 2x per wk  Start:  mcg/24 hr patch,   Typical: 100-200mcg daily  Max: 2 patches 2x/wk (200mcg/d) Typical: 37.5- 100mcg daily, changed 2x/wk Hx VTE, >40, smoker, transaminitis Patch falling off? Clean w/ EtOH first, apply & press x30sec. Baby oil to remove adhesive. Rx for 8 -16 patches/month.   Estradiol valerate (Delestrogen) Start: 5 mg IM weekly  Typical: 5-10 mg IM weekly  Max: 15-20 mg weekly 5mg IM weekly or 10 IM q2wk Ok w/ needles, >40, already on it Needles: 23-25gauge 1inch (if thigh) to 1.5inch (if glut), 1cc syringe, 18ga to draw up. Comes in 5ml bottles.   Estradiol cream  compounded Start: 50-100mcg to thighs daily  Typical: 100-200mcg   Max: 200 37.5-100mcg daily Ok w/ topical, paying out of pocket Pharm: [facility] [address] online, in PLO gel   Conj. Estrogens (premarin)  Typical: 5mg daily. Avoid if possible due to higher risk VTE Get IMAN & ensure informed of risk Consult local expert before long term continuation.    Spironolactone Start: 50-100mg po daily  Max: 200 (MOST)-300mg  DC Bothersome hair, acne or erections AntiAndrogen. Check bmp when on max dose. Split dose if dizzy.   Finasteride Start: 2.5mg po daily  Max: 5-10mg daily DC unless for alopecia Bothersome hair, alopecia, or erections AntiAndrogen - ok to use in combo w/ casper if poor feminizing effect   ?Progesterone? RARELY USE 200mg prometrium HS or q3mo depo shots IF ASKED ONLY DC Wants breast dev, no depression/obesity Causes wt gain and a lot of depression. Data inconclusive on breast developmt.     LABS Hgb Fasting Glu Fasting Lipid Total testosterone LFT s Prolactin Estrogen Ultrasens.   On shots? Draw labs  Midcycle! ( - way btw shots) -Baseline  -3 mo after    -6mo after  -then annual  After=after start or dose change Or A1c  -Baseline  -3 mo after   -6mo after  -then annual -Baseline  -3 mo after   -6mo after  -then annual -Baseline  -3 mo after   -6mo after  -q1yr.   Goal level <50 -Baseline  -3 mo after   -6mo after  -q1yr. Abnl?RUQ US, hep serology -if Sx only, rare.  If >100 needs f/u, MRI Check ONLY if on injectable or not clinically feminizing. Level should be 100-200.  >200 ?VTE risk and dose must be ?immediately, f/u in 1 month   On casper -Baseline BUN/Cr/lytes, Again when MAX dose  -annually thereafter, careful with ACEi s Follow up lab abnormalities w/repeat labs  UDon t panic ?may need small adjustment in dose.      -PPsychotherapy: not required! REC if: no consolidated gender, not gender dysphoric by criteria, nonconforming & may not need hormones  -E-Estrogen therapy is CONTRAINDICATED IF estrogen dependent cancer  -G-Gender related effects: Breast development, redistribution of body fat, softening of skin, ?mood, testes shrink, ?libido and ? fertility  -O-Other effects: ?VTE risk (steven if >40, smoker, obese, hx clots, po meds), ?Weight, emotional lability, ?lipids, ?erections, ?gallstones  **Upcoming surgery? HOLD ESTROGEN for 2-4 weeks prior to surgical procedure Adapted by Nasra Johsn MD from: [place] [facility], [facility], [facility], [facility] Guidelines, [facility] SOC-7.  Updated 6/2016  Trans Guidelines [facility]

## 2018-04-30 DIAGNOSIS — F64.0 GENDER DYSPHORIA IN ADOLESCENT AND ADULT: ICD-10-CM

## 2018-04-30 LAB
ALBUMIN SERPL-MCNC: 4.7 MG/DL (ref 3.8–5)
ALP SERPL-CCNC: 40.6 U/L (ref 31.7–110.7)
ALT SERPL-CCNC: 7 U/L (ref 0–45)
AST SERPL-CCNC: 11.9 U/L (ref 0–55)
BILIRUB SERPL-MCNC: 0.4 MG/DL (ref 0.2–1.3)
BUN SERPL-MCNC: 13.3 MG/DL (ref 7–21)
CALCIUM SERPL-MCNC: 9.4 MG/DL (ref 8.5–10.1)
CHLORIDE SERPLBLD-SCNC: 100.2 MMOL/L (ref 98–110)
CO2 SERPL-SCNC: 27.7 MMOL/L (ref 20–32)
CREAT SERPL-MCNC: 0.7 MG/DL (ref 0.7–1.3)
GFR SERPL CREATININE-BSD FRML MDRD: >90 ML/MIN/1.7 M2
GLUCOSE SERPL-MCNC: 90.7 MG'DL (ref 70–99)
POTASSIUM SERPL-SCNC: 4.5 MMOL/DL (ref 3.3–4.5)
PROT SERPL-MCNC: 6.9 G/DL (ref 6.8–8.8)
SODIUM SERPL-SCNC: 136.1 MMOL/L (ref 132.6–141.4)

## 2018-05-29 DIAGNOSIS — F64.0 GENDER DYSPHORIA IN ADOLESCENT AND ADULT: ICD-10-CM

## 2018-05-29 LAB
ALBUMIN SERPL-MCNC: 4.7 MG/DL (ref 3.8–5)
ALP SERPL-CCNC: 46.8 U/L (ref 31.7–110.7)
ALT SERPL-CCNC: 8.2 U/L (ref 0–45)
AST SERPL-CCNC: 13.5 U/L (ref 0–55)
BILIRUB SERPL-MCNC: 0.5 MG/DL (ref 0.2–1.3)
BUN SERPL-MCNC: 11.4 MG/DL (ref 7–21)
CALCIUM SERPL-MCNC: 9.9 MG/DL (ref 8.5–10.1)
CHLORIDE SERPLBLD-SCNC: 97.7 MMOL/L (ref 98–110)
CO2 SERPL-SCNC: 27.4 MMOL/L (ref 20–32)
CREAT SERPL-MCNC: 0.7 MG/DL (ref 0.7–1.3)
GFR SERPL CREATININE-BSD FRML MDRD: >90 ML/MIN/1.7 M2
GLUCOSE SERPL-MCNC: 99.9 MG'DL (ref 70–99)
POTASSIUM SERPL-SCNC: 4.1 MMOL/DL (ref 3.3–4.5)
PROT SERPL-MCNC: 7.4 G/DL (ref 6.8–8.8)
SODIUM SERPL-SCNC: 134.1 MMOL/L (ref 132.6–141.4)

## 2018-05-31 LAB — TESTOST SERPL-MCNC: 206 NG/DL (ref 240–950)

## 2018-06-12 ENCOUNTER — OFFICE VISIT (OUTPATIENT)
Dept: FAMILY MEDICINE | Facility: CLINIC | Age: 28
End: 2018-06-12
Payer: COMMERCIAL

## 2018-06-12 VITALS
SYSTOLIC BLOOD PRESSURE: 117 MMHG | HEART RATE: 83 BPM | OXYGEN SATURATION: 96 % | BODY MASS INDEX: 16.49 KG/M2 | RESPIRATION RATE: 16 BRPM | DIASTOLIC BLOOD PRESSURE: 76 MMHG | WEIGHT: 128.4 LBS | TEMPERATURE: 98 F

## 2018-06-12 DIAGNOSIS — F64.0 GENDER DYSPHORIA IN ADULT: Primary | ICD-10-CM

## 2018-06-12 NOTE — PATIENT INSTRUCTIONS
I will recommend injectable estrogen to Dr. Ledesma  Sometime we have to elaine insurance for coverage  Once you get your supplies and the estrogen, you can make a nurse appointment at [facility] for injection teaching  I also placed a referral to meet with a surgeon to discuss orchiectomy-you should get a call about that in the next few days-if not, please let us know.  Please see Dr. Ledesma within 1-2 mo after staring injectable estrogen.     Hey you guys!           Looks like the [facility] [place] is going to be reaching out to the patient to get this scheduled.          Thanks!     Kimberly

## 2018-06-12 NOTE — MR AVS SNAPSHOT
After Visit Summary   6/12/2018    Carlos Yu    MRN: [id number]           Patient Information     Date Of Birth          1990        Visit Information        Provider Department      6/12/2018 9:20 AM Gina Dupree DO [facility]        Today's Diagnoses     Gender dysphoria in adult    -  1      Care Instructions    I will recommend injectable estrogen to Dr. Ledesma  Sometime we have to elaine insurance for coverage  Once you get your supplies and the estrogen, you can make a nurse appointment at [facility] for injection teaching  I also placed a referral to meet with a surgeon to discuss orchiectomy-you should get a call about that in the next few days-if not, please let us know.  Please see Dr. Ledesma within 1-2 mo after staring injectable estrogen.           Follow-ups after your visit        Additional Services     COMPREHENSIVE GENDER CARE REFERRAL       Your provider has referred you for Comprehensive Gender Care: Patient would like to meet with a surgeon to discuss an orchiectomy.    Please be aware that coverage of these services is subject to the terms and limitations of your health insurance plan.  Call member services at your health plan with any benefit or coverage questions.      Please bring the following with you to your appointment:      (1) List of current medications   (2) This referral request   (3) Any documents/labs given to you for this referral                  Who to contact     Please call your clinic at [phone] to:    Ask questions about your health    Make or cancel appointments    Discuss your medicines    Learn about your test results    Speak to your doctor            Additional Information About Your Visit        MyChart Information     [employer] gives you secure access to your electronic health record. If you see a primary care provider, you can also send messages to your care team and make appointments. If you have questions, please call  your primary care clinic.  If you do not have a primary care provider, please call [phone] and they will assist you.      [employer] is an electronic gateway that provides easy, online access to your medical records. With [employer], you can request a clinic appointment, read your test results, renew a prescription or communicate with your care team.     To access your existing account, please contact your [facility] or call [phone] for assistance.        Care EveryWhere ID     This is your Care EveryWhere ID. This could be used by other organizations to access your [facility] medical records  [id number]        Your Vitals Were     Pulse Temperature Respirations Pulse Oximetry BMI (Body Mass Index)       83 98  F (36.7  C) (Oral) 16 96% 16.49 kg/m2        Blood Pressure from Last 3 Encounters:   06/12/18 117/76   04/10/18 115/74   01/02/18 126/86    Weight from Last 3 Encounters:   06/12/18 128 lb 6.4 oz (58.2 kg)   04/10/18 130 lb (59 kg)   01/02/18 132 lb 12.8 oz (60.2 kg)              We Performed the Following     COMPREHENSIVE GENDER CARE REFERRAL        Primary Care Provider Office Phone # Fax #    Won Ledesma [phone]       [facility] [address] [address]        Equal Access to Services     ALIA SHEARER : Hadii aad ku hadasho Soomaali, waaxda luqadaha, qaybta kaalmada adeegyada, papa valerio. So [facility] [phone].    ATENCIÓN: Si habla español, tiene a delgado disposición servicios gratuitos de asistencia lingüística. Llame al [phone].    We comply with applicable federal civil rights laws and [place] laws. We do not discriminate on the basis of race, color, national origin, age, disability, sex, sexual orientation, or gender identity.            Thank you!     Thank you for choosing [facility]  for your care. Our goal is always to provide you with  excellent care. Hearing back from our patients is one way we can continue to improve our services. Please take a few minutes to complete the written survey that you may receive in the mail after your visit with us. Thank you!             Your Updated Medication List - Protect others around you: Learn how to safely use, store and throw away your medicines at www.disposemymeds.org.          This list is accurate as of 6/12/18  9:53 AM.  Always use your most recent med list.                   Brand Name Dispense Instructions for use Diagnosis    estradiol 2 MG tablet    ESTRACE    360 tablet    Take 4 tablets (8 mg) by mouth daily    Gender dysphoria in adult       finasteride 1 MG tablet    PROPECIA    90 tablet    Take 1 tablet (1 mg) by mouth daily    Alopecia       melatonin 3 MG tablet      Take 1 tablet (3 mg) by mouth nightly as needed for sleep        spironolactone 100 MG tablet    ALDACTONE    60 tablet    Take 2 tablets (200 mg) by mouth daily    Gender dysphoria in adolescent and adult

## 2018-06-12 NOTE — Clinical Note
Ion Upton-Please see my note for recs. I am recommending you switch her over to IM estrogen (assuming we can get this covered), and I placed a referral for a possible orchiectomy. Since I am consulting, you will be in control of prescribing-she will need syringes/needles and a sharps container like we order for testosterone. She will continue to see you for ongoing care. Let me know if you have questions/concerns.  Gina

## 2018-06-12 NOTE — PROGRESS NOTES
[facility] Consultation:        Referred by: Dr. Ledesma  Referral Question: Discuss hormone levels on HRT  History Provided by: Patient        Assessment:     1. Gender Dysphoria  2. Elevated testosterone level    Jimbo is a 28-year-old transgender female who is been on hormone replacement therapy since September 2017. She is now on 8 mg of oral estrogen and 200 mg of Spironolactone per day.  She is also taking finasteride for hair loss.  She continues to have a somewhat elevated testosterone level, most recently around 200. She has noticed some physical changes including mild skin sofening, breast growth, decreased erections, and decreased facial hair. She is getting electrolysis for her facial hair.    I told Jimbo that it may be worth switching to a different form of estrogen to see if this has a different effect. She is interested in trying injectable estrogen.  I also told her that it is also possible we will not be able to fully suppress her testosterone level to be within typical cisgender female range, which is typically less than 70 ng/dL. We also talked about the possibility of her undergoing an orchiectomy, which is probably the best way to fully suppress her testosterone level.  She is very interested in this, and would like a referral to our [facility] for a consultation to discuss this further.           Recommendations:      -  Discontinue PO estrogen and start IM estrogen Valerate 10 mg weekly-will need rx for estrogen, needles/syringes, and sharps container.  It is possible we will need to fill out a prior authorization.   - She should schedule an appointment with our nurses for injection teaching after she gets her supplies  - Check a total testosterone level as well as estradiol level within 1-2 months after starting injectable estrogen. If T level still elevated, and estradiol level low or within typical mid-cycle range for non-transgender women,  "could increase to 20 mg q weekly  - I placed a referral to our comprehensive gender center in order to meet with a urologist or plastic surgeon to discuss orchiectomy further.   - Follow up with Dr. Ledesma for ongoing care      [facility]      --------------------------------------------------------------------    Statement Regarding Consult: Pt can return to PCP for ongoing care at this time. She should have annual physical exams and labs after hormone initiation is complete (at goal dose). Screenings and exams should be determined by the presence of body organs (ie: prostate, cervix, breasts) and medications the patient is taking. PCP is welcome to contact us at any time for further guidance if needed. Lab schedule should follow the hormone guideline sheet.         HPI:       Carlos Yu is a 28 year old transgender individual who uses pronouns she/her and is consulting due to elevated testosterone levels on feminizing HRT. Kelsi is a patient of Dr. Won Ledesma's and these notes were reviewed in detail prior to her visit    Kelsi started feminizing HRT in September 2017. She is now on estrogen 8 mg, 200 mg spironolactone, 1 mg finasteride. Since being on HRT, has had fewer erections, lower sex drive, feels less greasy, and has noticed some breast development. Feels like she has experienced fewer changes due to \"being skinny\". She has started laser therapy for facial hair-concerned about hair regrowth. Has had 5 sessions so far.     She hasn't tried a different form of estrogen besides oral.  She would be interested in injectable estrogen. Not interested in patch estrogen. She does have some financial concerns-says her insurance coverage is poor.     Of note, she has a transgender brother who uses injectable testosterone, and she will be moving in with him soon.    No recent illnesses, no smoking, no personal history or family history of " DVT/PE.    Outside records reviewed.  ----  Past Surgical History:   Procedure Laterality Date     NO HISTORY OF SURGERY         Patient Active Problem List   Diagnosis     Gender dysphoria in adolescent and adult     Mild intermittent asthma     Alopecia     Circadian dysregulation     Past Medical History:   Diagnosis Date     Knee pain, chronic, intermittent      Uncomplicated asthma        Current Outpatient Prescriptions   Medication Sig Dispense Refill     estradiol (ESTRACE) 2 MG tablet Take 4 tablets (8 mg) by mouth daily 360 tablet 1     finasteride (PROPECIA) 1 MG tablet Take 1 tablet (1 mg) by mouth daily 90 tablet 1     melatonin 3 MG tablet Take 1 tablet (3 mg) by mouth nightly as needed for sleep       spironolactone (ALDACTONE) 100 MG tablet Take 2 tablets (200 mg) by mouth daily 60 tablet 5       Family History   Problem Relation Age of Onset     Other Cancer Father      Leukemia     Breast Cancer Maternal Grandmother        No Known Allergies    Social History     Social History     Marital status: Single     Spouse name: N/A     Number of children: N/A     Years of education: N/A     Social History Main Topics     Smoking status: Never Smoker     Smokeless tobacco: Never Used     Alcohol use No     Drug use: No     Sexual activity: No     Other Topics Concern     None     Social History Narrative              Review of Systems:      10 point ROS of systems including Constitutional, Eyes, Respiratory, Cardiovascular, Gastroenterology, Genitourinary, Integumentary, Muscularskeletal, Psychiatric were all negative except for pertinent positives noted in my HPI.          Physical Exam:     Vitals:    06/12/18 0925   BP: 117/76   Pulse: 83   Resp: 16   Temp: 98  F (36.7  C)   TempSrc: Oral   SpO2: 96%   Weight: 128 lb 6.4 oz (58.2 kg)     BMI= Body mass index is 16.49 kg/(m^2).   Wt Readings from Last 10 Encounters:   06/12/18 128 lb 6.4 oz (58.2 kg)   04/10/18 130 lb (59 kg)   01/02/18 132 lb 12.8 oz  (60.2 kg)   12/11/17 127 lb 9.6 oz (57.9 kg)   11/14/17 130 lb (59 kg)   10/18/17 130 lb (59 kg)   09/14/17 129 lb (58.5 kg)   08/16/17 126 lb 6.4 oz (57.3 kg)   07/21/17 125 lb (56.7 kg)     GENERAL thin, healthy, alert and no distress  RESP: calm respirations on room air  CV: regula rate, well perfused  Psych: Alert and oriented times 3; coherent speech, normal rate and volume, able to articulate logical thoughts, able to abstract reason, no tangential thoughts, no hallucinations or delusions.  Affect: Anxious and shy         Labs:     Orders Only on 05/29/2018   Component Date Value Ref Range Status     Testosterone Total 05/29/2018 206* 240 - 950 ng/dL Final    Comment: This test was developed and its performance characteristics determined by the   [facility],  Special Chemistry Laboratory. It has   not been cleared or approved by the FDA. The laboratory is regulated under   CLIA as qualified to perform high-complexity testing. This test is used for   clinical purposes. It should not be regarded as investigational or for   research.       Albumin 05/29/2018 4.7  3.8 - 5.0 mg/dL Final     Alkaline Phosphatase 05/29/2018 46.8  31.7 - 110.7 U/L Final     ALT 05/29/2018 8.2  0.0 - 45.0 U/L Final     AST 05/29/2018 13.5  0.0 - 55.0 U/L Final     Bilirubin Total 05/29/2018 0.5  0.2 - 1.3 mg/dL Final     Urea Nitrogen 05/29/2018 11.4  7.0 - 21.0 mg/dL Final     Calcium 05/29/2018 9.9  8.5 - 10.1 mg/dL Final     Chloride 05/29/2018 97.7* 98.0 - 110.0 mmol/L Final     Carbon Dioxide 05/29/2018 27.4  20.0 - 32.0 mmol/L Final     Creatinine 05/29/2018 0.7  0.7 - 1.3 mg/dL Final     Glucose 05/29/2018 99.9* 70.0 - 99.0 mg'dL Final     Potassium 05/29/2018 4.1  3.3 - 4.5 mmol/dL Final     Sodium 05/29/2018 134.1  132.6 - 141.4 mmol/L Final     Protein Total 05/29/2018 7.4  6.8 - 8.8 g/dL Final     GFR Estimate 05/29/2018 >90  >60.0 mL/min/1.7 m2 Final     GFR Estimate If Black 05/29/2018 >90   >60.0 mL/min/1.7 m2 Final         Gina TOMPKINS spent 40 min face to face with the patient and >50% was spent counselling the patient about the above medical conditions, educating patient and discussing the recommendations and followup.

## 2018-06-20 ENCOUNTER — PRE VISIT (OUTPATIENT)
Dept: UROLOGY | Facility: CLINIC | Age: 28
End: 2018-06-20

## 2018-06-21 ENCOUNTER — PRE VISIT (OUTPATIENT)
Dept: UROLOGY | Facility: CLINIC | Age: 28
End: 2018-06-21

## 2018-06-21 NOTE — TELEPHONE ENCOUNTER
Patient with history of gender dysphoria coming in for orchiectomy consult with Dr. Cutler. Patient chart reviewed, no need for call, all records available and ready for appointment.

## 2018-07-02 ENCOUNTER — TELEPHONE (OUTPATIENT)
Dept: FAMILY MEDICINE | Facility: CLINIC | Age: 28
End: 2018-07-02

## 2018-07-02 DIAGNOSIS — F64.0 GENDER DYSPHORIA IN ADULT: Primary | ICD-10-CM

## 2018-07-02 NOTE — TELEPHONE ENCOUNTER
[facility] phone call message- patient requesting a refill:    Full Medication Name: estradiol (ESTRACE) 2 MG tablet;finasteride (PROPECIA) 1 MG tablet      Pharmacy confirmed as   [facility] [place], [place] - [address]  [facility] [address]  Phone: [phone] Fax: [phone]  : Yes    Additional Comments: Patient is out of medications. Please refill.  OK to leave a message on voice mail? Yes    Primary language: English      needed? No    Call taken on July 2, 2018 at 10:01 AM by Chitra Martines

## 2018-07-02 NOTE — TELEPHONE ENCOUNTER
[facility] phone call message- patient requesting to speak with PCP or provider:    PCP: Won Ledesma    Additional Comments: Patient has tried to communicate with PCP through [facility] but Dr. Ledesma is not one of the doctors to choose from.  (sliceXhart issue??)  Patient went to [facility] and they discussed changing medication: estradiol to something else.  Patient just wondering what the status of this is.  Please contact.    Is a call back needed? Yes    Patient informed that it may take up to 2 business days to hear back from PCP:Yes    OK to leave a message on voice mail? Yes    Primary language: English      needed? No    Call taken on July 2, 2018 at 10:03 AM by Chitra Martines

## 2018-07-03 RX ORDER — ESTRADIOL VALERATE 20 MG/ML
10 INJECTION INTRAMUSCULAR WEEKLY
Qty: 2 ML | Refills: 1 | Status: SHIPPED | OUTPATIENT
Start: 2018-07-03 | End: 2018-07-27

## 2018-07-03 NOTE — TELEPHONE ENCOUNTER
July 3, 2018  4:55 PM    I returned the patient's phone call.  She was inquiring as to the recommendations from the gender support clinic.  She was unable to get her testosterone adequately suppressed despite 8 mg daily oral estrogen hormone replacement therapy.  She was also on spironolactone 200 mg as well as finasteride.  Gender support clinic recommended switching to injectable form and considering orchiectomy, both options the patient is interested in.  I called the patient to discuss these recommendations.  She would like to start injectable estrogen at this time.  I told her that I sent injectable estrogen, needles, and syringes to the [facility] pharmacy.  I advised her to contact the pharmacy to see when these would be available for pickup.  I advised her then to call [facility] and arrange for a nurse only appointment for injection teaching.  I will start her at 10 mg injectable estrogen (IM) weekly.  We will plan to check labs in 1 month and if testosterone remains elevated, will consider increasing to 20 mg weekly.  Patient is very satisfied with plan.  We will plan to discuss possible referral for orchiectomy at her next appointment.  All of her questions were answered to her satisfaction.    Won Ledesma MD  Chief Resident  [facility]  [phone]

## 2018-07-05 ENCOUNTER — TELEPHONE (OUTPATIENT)
Dept: FAMILY MEDICINE | Facility: CLINIC | Age: 28
End: 2018-07-05

## 2018-07-05 NOTE — TELEPHONE ENCOUNTER
Dr. Ledesma -- generic formulation of estradiol injection is no longer being made by the .  We can get brand name, however, it requires a Prior Authorization from the patient's insurance.  We are submitting the PA request to the prior auth team.      Insurance: [employer]  BIN: [id number]  PCN: [id number]  Group: [id number]  ID: [id number]  Phone: [phone]    Thank you,  Karen Kunz, PharmD  [facility]  [phone]

## 2018-07-06 ENCOUNTER — OFFICE VISIT (OUTPATIENT)
Dept: UROLOGY | Facility: CLINIC | Age: 28
End: 2018-07-06
Payer: COMMERCIAL

## 2018-07-06 VITALS
SYSTOLIC BLOOD PRESSURE: 119 MMHG | HEIGHT: 73 IN | BODY MASS INDEX: 17.1 KG/M2 | HEART RATE: 74 BPM | WEIGHT: 129 LBS | DIASTOLIC BLOOD PRESSURE: 75 MMHG

## 2018-07-06 DIAGNOSIS — F64.0 GENDER DYSPHORIA IN ADULT: Primary | ICD-10-CM

## 2018-07-06 RX ORDER — SPIRONOLACTONE 50 MG/1
TABLET, FILM COATED ORAL
COMMUNITY
Start: 2017-10-12 | End: 2018-10-23

## 2018-07-06 ASSESSMENT — PAIN SCALES - GENERAL: PAINLEVEL: NO PAIN (0)

## 2018-07-06 NOTE — LETTER
"7/6/2018       RE: Carlos Yu  [address] [address]     Dear Colleague,    Thank you for referring your patient, Carlos Yu, to the [facility] UROLOGY AND INST FOR PROSTATE AND UROLOGIC CANCERS at [facility]. Please see a copy of my visit note below.    UROLOGY CONSULT H&P    Name: Carlos Yu    MRN: [id number]   YOB: 1990                 Chief Complaint:   Gender Dysphoria          History of Present Illness:   Carlos Yu is a 28 year old transgender female seen in consultation from Dr. Dupree at [facility].    She has been on hormone replacement since September 2017. She on oral estrogen and spironolactone. She is also taking Finasteride for hair loss. She is getting electrolysis for facial hair.    She has been struggling with somewhat elevated testosterone ~200. She would like this further decreased. She would also like to have an orchiectomy performed as the next step in her transition.    She lives as a female for >80% of her daily activities. She has 2 jobs and describes that she is not \"out\" for one of her jobs. She has lived as a female basically since her hormone replacement started.     She has not had any surgical intervention for gender dysphoria.         Past Medical History:     Past Medical History:   Diagnosis Date     Gender dysphoria in adult      Knee pain, chronic, intermittent      Uncomplicated asthma             Past Surgical History:     Past Surgical History:   Procedure Laterality Date     NO HISTORY OF SURGERY              Social History:     Social History   Substance Use Topics     Smoking status: Never Smoker     Smokeless tobacco: Never Used     Alcohol use No            Family History:     Family History   Problem Relation Age of Onset     Other Cancer Father      Leukemia     Breast Cancer Maternal Grandmother               Allergies:   No Known Allergies         " "Medications:     Current Outpatient Prescriptions   Medication Sig     estradiol (ESTRACE) 2 MG tablet Take 4 tablets (8 mg) by mouth daily     estradiol valerate (DELESTROGEN) 20 MG/ML injection Inject 0.5 mLs (10 mg) into the muscle once a week     finasteride (PROPECIA) 1 MG tablet Take 1 tablet (1 mg) by mouth daily     melatonin 3 MG tablet Take 1 tablet (3 mg) by mouth nightly as needed for sleep     needle, disp, 18G X 1\" MISC Use to draw up hormones once weekly     Needle, Disp, 25G X 1-1/2\" MISC Use once weekly for administering hormone IM     spironolactone (ALDACTONE) 100 MG tablet Take 2 tablets (200 mg) by mouth daily     spironolactone (ALDACTONE) 50 MG tablet      syringe, disposable, 1 ML MISC Use once weekly to draw up hormones     No current facility-administered medications for this visit.              Review of Systems:    ROS: 14 point ROS neg other than the symptoms noted above in the HPI and PMH.          Physical Exam:   B/P: 119/75, T: Data Unavailable, P: 74, R: Data Unavailable  Estimated body mass index is 17.02 kg/(m^2) as calculated from the following:    Height as of this encounter: 1.854 m (6' 1\").    Weight as of this encounter: 58.5 kg (129 lb).  General: age-appropriate appearing transgender female in NAD. Thin body habitus  HEENT: Head AT/NC, EOMI, CN Grossly intact  Resp: no respiratory distress, lung sounds clear.  CV: heart rate regular, S1, S2.  Lymph: No cervical, supraclavicular or axillary lymphadenopathy  Back: bony spine is non-tender, flanks are nontender  Abdomen: no obesity , soft, non-distended, non-tender. No organomegaly. Surgical scars include: none  : testicles normal without atrophy or masses and penis normal without urethral discharge;   LE: no edema.   Neuro: grossly intact  Motor: excellent strength throughout  Skin: clear of rashes or ecchymoses.        Labs:    All laboratory data reviewed with patient  Significant for total testosterone 206 ng/dL on " 5/29/2018.        Outside records:    I spent 10 minutes reviewing outside records.           Assessment and Plan:   28 year old male with gender dysphoria. She was referred for consideration of an orchiectomy. She does not yet have 2 letters of support, which are required by [facility] guidelines for bottom surgery. She has been on hormones continuously for 10 months. Guidelines recommend 12 months of hormones.    I discussed at length the reasoning for orchiectomy, possibility of scrotal reduction, options for future top and bottom surgeries in the future.  I discussed that for feminization procedures, the [facility] offers orchiectomy, transgender vaginoplasty, and breast augmentation. At this time, she continues to desire an orchiectomy and wants to further consider transgender vaginoplasty in the future. She would prefer a stepwise approach. I also discussed that scrotal reduction can make future vaginoplasty more complicated as there is less tissue to work with. I also discussed that orchiectomy is permanent and cannot be reversed. I also discussed that this will permanently affect fertility. She understands.     1. Message sent to Juan Alvarez (Care Coordinator for [facility]) to help coordinate obtaining her 2 letters of support.  2. Continue hormones.  3. Followup early Sept 2018. If letters obtained and hormones continued, then will schedule for bilateral orchiectomy.    Lucas Cutler MD   Reconstructive Urology  [facility]

## 2018-07-06 NOTE — MR AVS SNAPSHOT
After Visit Summary   7/6/2018    Carlos Yu    MRN: [id number]           Patient Information     Date Of Birth          1990        Visit Information        Provider Department      7/6/2018 12:00 PM Lucas Cutler MD [facility] and [facility] for Prostate and Urologic Cancers        Care Instructions    Schedule appointment in clinic to see Dr. Cutler the first week of September.  Bring the two letters as discussed with the doctor, and continue with the hormones until then.    It was a pleasure meeting with you today.  Thank you for allowing me and my team the privilege of caring for you today.  YOU are the reason we are here, and I truly hope we provided you with the excellent service you deserve.  Please let us know if there is anything else we can do for you so that we can be sure you are leaving completely satisfied with your care experience.       Ruy Weaver LPN          Follow-ups after your visit        Who to contact     Please call your clinic at [phone] to:    Ask questions about your health    Make or cancel appointments    Discuss your medicines    Learn about your test results    Speak to your doctor            Additional Information About Your Visit        [employer] Information     [employer] gives you secure access to your electronic health record. If you see a primary care provider, you can also send messages to your care team and make appointments. If you have questions, please call your primary care clinic.  If you do not have a primary care provider, please call [phone] and they will assist you.      [employer] is an electronic gateway that provides easy, online access to your medical records. With [employer], you can request a clinic appointment, read your test results, renew a prescription or communicate with your care team.     To access your existing account, please contact your [facility] or call [phone] for assistance.       "  Care EveryWhere ID     This is your Care EveryWhere ID. This could be used by other organizations to access your [facility] medical records  [id number]        Your Vitals Were     Pulse Height BMI (Body Mass Index)             74 1.854 m (6' 1\") 17.02 kg/m2          Blood Pressure from Last 3 Encounters:   07/06/18 119/75   06/12/18 117/76   04/10/18 115/74    Weight from Last 3 Encounters:   07/06/18 58.5 kg (129 lb)   06/12/18 58.2 kg (128 lb 6.4 oz)   04/10/18 59 kg (130 lb)              Today, you had the following     No orders found for display       Primary Care Provider Office Phone # Fax #    Won Ledesma [phone]       [facility] [address]   [facility] [address]        Equal Access to Services     [facility]: Hadii blayne ku hadasho Soomaali, waaxda luqadaha, qaybta kaalmada adeegyada, papa hyatt haychelsin divya miramontes . So [facility] [phone].    ATENCIÓN: Si habla español, tiene a delgado disposición servicios gratuitos de asistencia lingüística. Rosie al [phone].    We comply with applicable federal civil rights laws and [place] laws. We do not discriminate on the basis of race, color, national origin, age, disability, sex, sexual orientation, or gender identity.            Thank you!     Thank you for choosing [facility]OLOGY AND [facility] FOR PROSTATE AND UROLOGIC CANCERS  for your care. Our goal is always to provide you with excellent care. Hearing back from our patients is one way we can continue to improve our services. Please take a few minutes to complete the written survey that you may receive in the mail after your visit with us. Thank you!             Your Updated Medication List - Protect others around you: Learn how to safely use, store and throw away your medicines at www.disposemymeds.org.          This list is accurate as of 7/6/18 12:25 PM.  Always use your most recent med list.                   Brand Name Dispense " "Instructions for use Diagnosis    estradiol 2 MG tablet    ESTRACE    180 tablet    Take 4 tablets (8 mg) by mouth daily    Gender dysphoria in adult       estradiol valerate 20 MG/ML injection    DELESTROGEN    2 mL    Inject 0.5 mLs (10 mg) into the muscle once a week    Gender dysphoria in adult       finasteride 1 MG tablet    PROPECIA    90 tablet    Take 1 tablet (1 mg) by mouth daily    Alopecia       melatonin 3 MG tablet      Take 1 tablet (3 mg) by mouth nightly as needed for sleep        needle (disp) 18G X 1\" Misc     25 each    Use to draw up hormones once weekly    Gender dysphoria in adult       Needle (Disp) 25G X 1-1/2\" Misc     25 each    Use once weekly for administering hormone IM    Gender dysphoria in adult       * spironolactone 50 MG tablet    ALDACTONE          * spironolactone 100 MG tablet    ALDACTONE    60 tablet    Take 2 tablets (200 mg) by mouth daily    Gender dysphoria in adolescent and adult       syringe (disposable) 1 ML Misc     25 each    Use once weekly to draw up hormones    Gender dysphoria in adult       * Notice:  This list has 2 medication(s) that are the same as other medications prescribed for you. Read the directions carefully, and ask your doctor or other care provider to review them with you.      "

## 2018-07-06 NOTE — PATIENT INSTRUCTIONS
Schedule appointment in clinic to see Dr. Cutler the first week of September.  Bring the two letters as discussed with the doctor, and continue with the hormones until then.    It was a pleasure meeting with you today.  Thank you for allowing me and my team the privilege of caring for you today.  YOU are the reason we are here, and I truly hope we provided you with the excellent service you deserve.  Please let us know if there is anything else we can do for you so that we can be sure you are leaving completely satisfied with your care experience.       Ruy Weaver LPN

## 2018-07-07 NOTE — PROGRESS NOTES
"UROLOGY CONSULT H&P    Name: Carlos Yu    MRN: [id number]   YOB: 1990                 Chief Complaint:   Gender Dysphoria          History of Present Illness:   Carlos Yu is a 28 year old transgender female seen in consultation from Dr. Dupree at [facility].    She has been on hormone replacement since September 2017. She on oral estrogen and spironolactone. She is also taking Finasteride for hair loss. She is getting electrolysis for facial hair.    She has been struggling with somewhat elevated testosterone ~200. She would like this further decreased. She would also like to have an orchiectomy performed as the next step in her transition.    She lives as a female for >80% of her daily activities. She has 2 jobs and describes that she is not \"out\" for one of her jobs. She has lived as a female basically since her hormone replacement started.     She has not had any surgical intervention for gender dysphoria.         Past Medical History:     Past Medical History:   Diagnosis Date     Gender dysphoria in adult      Knee pain, chronic, intermittent      Uncomplicated asthma             Past Surgical History:     Past Surgical History:   Procedure Laterality Date     NO HISTORY OF SURGERY              Social History:     Social History   Substance Use Topics     Smoking status: Never Smoker     Smokeless tobacco: Never Used     Alcohol use No            Family History:     Family History   Problem Relation Age of Onset     Other Cancer Father      Leukemia     Breast Cancer Maternal Grandmother               Allergies:   No Known Allergies         Medications:     Current Outpatient Prescriptions   Medication Sig     estradiol (ESTRACE) 2 MG tablet Take 4 tablets (8 mg) by mouth daily     estradiol valerate (DELESTROGEN) 20 MG/ML injection Inject 0.5 mLs (10 mg) into the muscle once a week     finasteride (PROPECIA) 1 MG tablet Take 1 tablet (1 mg) by mouth daily     " "melatonin 3 MG tablet Take 1 tablet (3 mg) by mouth nightly as needed for sleep     needle, disp, 18G X 1\" MISC Use to draw up hormones once weekly     Needle, Disp, 25G X 1-1/2\" MISC Use once weekly for administering hormone IM     spironolactone (ALDACTONE) 100 MG tablet Take 2 tablets (200 mg) by mouth daily     spironolactone (ALDACTONE) 50 MG tablet      syringe, disposable, 1 ML MISC Use once weekly to draw up hormones     No current facility-administered medications for this visit.              Review of Systems:    ROS: 14 point ROS neg other than the symptoms noted above in the HPI and PMH.          Physical Exam:   B/P: 119/75, T: Data Unavailable, P: 74, R: Data Unavailable  Estimated body mass index is 17.02 kg/(m^2) as calculated from the following:    Height as of this encounter: 1.854 m (6' 1\").    Weight as of this encounter: 58.5 kg (129 lb).  General: age-appropriate appearing transgender female in NAD. Thin body habitus  HEENT: Head AT/NC, EOMI, CN Grossly intact  Resp: no respiratory distress, lung sounds clear.  CV: heart rate regular, S1, S2.  Lymph: No cervical, supraclavicular or axillary lymphadenopathy  Back: bony spine is non-tender, flanks are nontender  Abdomen: no obesity , soft, non-distended, non-tender. No organomegaly. Surgical scars include: none  : testicles normal without atrophy or masses and penis normal without urethral discharge;   LE: no edema.   Neuro: grossly intact  Motor: excellent strength throughout  Skin: clear of rashes or ecchymoses.        Labs:    All laboratory data reviewed with patient  Significant for total testosterone 206 ng/dL on 5/29/2018.        Outside records:    I spent 10 minutes reviewing outside records.           Assessment and Plan:   28 year old male with gender dysphoria. She was referred for consideration of an orchiectomy. She does not yet have 2 letters of support, which are required by [facility] guidelines for bottom surgery. She has been on " hormones continuously for 10 months. Guidelines recommend 12 months of hormones.    I discussed at length the reasoning for orchiectomy, possibility of scrotal reduction, options for future top and bottom surgeries in the future.  I discussed that for feminization procedures, the [facility] offers orchiectomy, transgender vaginoplasty, and breast augmentation. At this time, she continues to desire an orchiectomy and wants to further consider transgender vaginoplasty in the future. She would prefer a stepwise approach. I also discussed that scrotal reduction can make future vaginoplasty more complicated as there is less tissue to work with. I also discussed that orchiectomy is permanent and cannot be reversed. I also discussed that this will permanently affect fertility. She understands.     1. Message sent to Juan Alvraez (Care Coordinator for [facility]) to help coordinate obtaining her 2 letters of support.  2. Continue hormones.  3. Followup early Sept 2018. If letters obtained and hormones continued, then will schedule for bilateral orchiectomy.    Lucas Cutler MD   Reconstructive Urology  [facility]

## 2018-07-09 ENCOUNTER — TELEPHONE (OUTPATIENT)
Dept: PLASTIC SURGERY | Facility: CLINIC | Age: 28
End: 2018-07-09

## 2018-07-10 NOTE — TELEPHONE ENCOUNTER
Was this PA initiated? We are just waiting on response?    JACQUI Sanders 10:31 AM July 10, 2018

## 2018-07-10 NOTE — TELEPHONE ENCOUNTER
Central Prior Authorization Team   Phone: [phone]      PA Initiation    Medication: Delestrogen (estradiol injection)-PA Initiated  Insurance Company: [employer] - Phone [phone] Fax [phone]  Pharmacy Filling the Rx: [facility] [place], [place] - [address]  Filling Pharmacy Phone: [phone]  Filling Pharmacy Fax: [phone]  Start Date: 7/10/2018

## 2018-07-12 ENCOUNTER — TELEPHONE (OUTPATIENT)
Dept: PLASTIC SURGERY | Facility: CLINIC | Age: 28
End: 2018-07-12

## 2018-07-12 NOTE — TELEPHONE ENCOUNTER
Called pt to navigate LOS for Dr. Cutler in Urology. Pt stated she couldn't talk due to being at work. Pt stated she would try to call tomorrow 7/13 in AM to discuss care navigation.

## 2018-07-12 NOTE — TELEPHONE ENCOUNTER
Prior Authorization Approval    Authorization Effective Date: 6/10/2018  Authorization Expiration Date: 7/11/2019  Medication: Delestrogen (estradiol injection)-APPROVED  Approved Dose/Quantity:   Reference #: [id number]   Insurance Company: [employer] - Phone [phone] Fax [phone]  Expected CoPay:       CoPay Card Available:      Foundation Assistance Needed:    Which Pharmacy is filling the prescription (Not needed for infusion/clinic administered): [facility] [place], [place] - [address]  Pharmacy Notified: Yes  Patient Notified: No    Pharmacy will notify patient when ready.

## 2018-07-13 NOTE — TELEPHONE ENCOUNTER
Pt called back to discuss LOS for urology. Pt needs assistance with Insurance Coordination, pt reports she has catastrophic insurance and is worried about medical bills related to orchiectomy. Pt reports she has never seen a therapist and could obtain a letter for PCP. Pt goes to [facility] and may be able to see  or Psychologist for LOS.     Plan: Pt will meet with writer to navigate insurance and discuss LOS. Possibly refer to [facility] gender support clinic mental health to obtain LOS.

## 2018-07-27 ENCOUNTER — OFFICE VISIT (OUTPATIENT)
Dept: FAMILY MEDICINE | Facility: CLINIC | Age: 28
End: 2018-07-27
Payer: COMMERCIAL

## 2018-07-27 VITALS
TEMPERATURE: 97.8 F | RESPIRATION RATE: 16 BRPM | SYSTOLIC BLOOD PRESSURE: 125 MMHG | BODY MASS INDEX: 17.36 KG/M2 | OXYGEN SATURATION: 97 % | WEIGHT: 131.6 LBS | DIASTOLIC BLOOD PRESSURE: 74 MMHG | HEART RATE: 79 BPM

## 2018-07-27 DIAGNOSIS — F64.0 GENDER DYSPHORIA IN ADULT: ICD-10-CM

## 2018-07-27 RX ORDER — ESTRADIOL 2 MG/1
8 TABLET ORAL DAILY
Qty: 120 TABLET | Refills: 5 | Status: SHIPPED | OUTPATIENT
Start: 2018-07-27 | End: 2018-10-23

## 2018-07-27 NOTE — PROGRESS NOTES
LENARD     Carlos She/Her is a 28 year old individual that uses pronouns She/Her/Hers/Herself that presents today for follow up of:  feminizing hormone therapy. Gender identity: Female    Any special concerns today?  no current concerns except poor insurance coverage currently.  Not switching to injections for that reason right now, also deferring on gender re-assignment surgeries until insurance issues resolved    On hormones?  YES , not due for labs    ---    Past Surgical History:   Procedure Laterality Date     NO HISTORY OF SURGERY         Patient Active Problem List   Diagnosis     Gender dysphoria in adolescent and adult     Mild intermittent asthma     Alopecia     Circadian dysregulation       Current Outpatient Prescriptions   Medication Sig Dispense Refill     estradiol (ESTRACE) 2 MG tablet Take 4 tablets (8 mg) by mouth daily 180 tablet 1     finasteride (PROPECIA) 1 MG tablet Take 1 tablet (1 mg) by mouth daily 90 tablet 1     melatonin 3 MG tablet Take 1 tablet (3 mg) by mouth nightly as needed for sleep       spironolactone (ALDACTONE) 100 MG tablet Take 2 tablets (200 mg) by mouth daily 60 tablet 5     spironolactone (ALDACTONE) 50 MG tablet          History   Smoking Status     Never Smoker   Smokeless Tobacco     Never Used        No Known Allergies    Problem, Medication and Allergy Lists were .         Review of Systems:      General    Fat redistribution: YES    Weight change: no HEENT    Voice change: no     Cardiovascular (CV)    Chest Pains: no    Shortness of breath: no Chest    Decreased exercise tolerance:  no    Breast changes/development: YES     Gastrointestinal (GI)    Abdominal pain: no    Change in appetite: no Skin    Acne or oily skin: no    Change in hair: no     Genitourinary ()    Abnormal vaginal bleeding: not applicable     Decreased spontaneous erections: YES    Change in libido: no    New sexual partners: no Musculoskeletal    Leg pain or swelling: no      Psychiatric (Psych)    Depression: no    Anxiety/Panic: no    Mood:  Tired                    Physical Exam:     Vitals:    07/27/18 0937   BP: 125/74   Pulse: 79   Resp: 16   Temp: 97.8  F (36.6  C)   TempSrc: Oral   SpO2: 97%   Weight: 131 lb 9.6 oz (59.7 kg)     BMI= Body mass index is 17.36 kg/(m^2).   Wt Readings from Last 10 Encounters:   07/27/18 131 lb 9.6 oz (59.7 kg)   07/06/18 129 lb (58.5 kg)   06/12/18 128 lb 6.4 oz (58.2 kg)   04/10/18 130 lb (59 kg)   01/02/18 132 lb 12.8 oz (60.2 kg)   12/11/17 127 lb 9.6 oz (57.9 kg)   11/14/17 130 lb (59 kg)   10/18/17 130 lb (59 kg)   09/14/17 129 lb (58.5 kg)   08/16/17 126 lb 6.4 oz (57.3 kg)     Appearance: Both appearance and dress    General: Alert and oriented, in no acute distress.  Skin: Warm and dry, no abnormalities noted.  Eyes: Extra-ocular muscles intact, pupils equal and reactive.  ENT: Speech intact, nasal passages open, no hearing impairment noted  Neck: Supple, no swelling noted  Respiratory: No respiratory difficulty noted.  Neuro: Gait and station normal, comprehension intact. Gross and fine motor skills intact.   Psychiatric: Mood and affect appear normal.   Extremities: Warm           Labs:   Results from last visit:  Orders Only on 05/29/2018   Component Date Value Ref Range Status     Testosterone Total 05/29/2018 206* 240 - 950 ng/dL Final    Comment: This test was developed and its performance characteristics determined by the   [facility],  Special Chemistry Laboratory. It has   not been cleared or approved by the FDA. The laboratory is regulated under   CLIA as qualified to perform high-complexity testing. This test is used for   clinical purposes. It should not be regarded as investigational or for   research.       Albumin 05/29/2018 4.7  3.8 - 5.0 mg/dL Final     Alkaline Phosphatase 05/29/2018 46.8  31.7 - 110.7 U/L Final     ALT 05/29/2018 8.2  0.0 - 45.0 U/L Final     AST 05/29/2018 13.5  0.0 - 55.0  U/L Final     Bilirubin Total 05/29/2018 0.5  0.2 - 1.3 mg/dL Final     Urea Nitrogen 05/29/2018 11.4  7.0 - 21.0 mg/dL Final     Calcium 05/29/2018 9.9  8.5 - 10.1 mg/dL Final     Chloride 05/29/2018 97.7* 98.0 - 110.0 mmol/L Final     Carbon Dioxide 05/29/2018 27.4  20.0 - 32.0 mmol/L Final     Creatinine 05/29/2018 0.7  0.7 - 1.3 mg/dL Final     Glucose 05/29/2018 99.9* 70.0 - 99.0 mg'dL Final     Potassium 05/29/2018 4.1  3.3 - 4.5 mmol/dL Final     Sodium 05/29/2018 134.1  132.6 - 141.4 mmol/L Final     Protein Total 05/29/2018 7.4  6.8 - 8.8 g/dL Final     GFR Estimate 05/29/2018 >90  >60.0 mL/min/1.7 m2 Final     GFR Estimate If Black 05/29/2018 >90  >60.0 mL/min/1.7 m2 Final       Assessment and Plan     1. Gender dysphoria in adult  Patient is on max safe dose of oral estrogen and spironolactone with suboptimal testosterone suppression.  Patient wishes to pursue orchiectomy and consider alternative forms of estrogen (injectible), howwever, due to insurance issues, these options are too costly currently for her.  Will continue current regimen and plan for follow up in 6 months or sooner if she wishes to change course of action or has insurance carrier change.    - estradiol (ESTRACE) 2 MG tablet; Take 4 tablets (8 mg) by mouth daily  Dispense: 120 tablet; Refill: 5        Contraception:   abstinence  .   Counselled patient about controlled substances: Yes. Details: currently not using    Follow up:  Follow up in 6 months. Or sooner as needed or for asthma follow up  [facility]  Questions were elicited and answered.     Won Ledesma MD

## 2018-07-27 NOTE — MR AVS SNAPSHOT
After Visit Summary   7/27/2018    Carlos Yu    MRN: [id number]           Patient Information     Date Of Birth          1990        Visit Information        Provider Department      7/27/2018 9:40 AM Won Simon MD [facility]        Care Instructions    Here is the plan from today's visit    There are no diagnoses linked to this encounter.    Please call or return to clinic if your symptoms don't go away.    Follow up plan  Please make a clinic appointment for follow up with me (WON SIMON) in 6     Thank you for coming to [facility] today.  Lab Testing:  **If you had lab testing today and your results are reassuring or normal they will be mailed to you or sent through TableGr[employer] within 7 days.   **If the lab tests need quick action we will call you with the results.  The phone number we will call with results is # [phone] (home) . If this is not the best number please call our clinic and change the number.  Medication Refills:  If you need any refills please call your pharmacy and they will contact us.   If you need to  your refill at a new pharmacy, please contact the new pharmacy directly. The new pharmacy will help you get your medications transferred faster.   Scheduling:  If you have any concerns about today's visit or wish to schedule another appointment please call our office during normal business hours [phone] (8-5:00 M-F)  If a referral was made to a [facility] and you don't get a call from central scheduling please call [phone].  If a Mammogram was ordered for you at [facility] call [phone] to schedule or change your appointment.  If you had an XRay/CT/Ultrasound/MRI ordered the number is [phone] to schedule or change your radiology appointment.   Medical Concerns:  If you have urgent medical concerns please call [phone] at any time of the day.    Won ELLIS  MD Baltazar            Follow-ups after your visit        Your next 10 appointments already scheduled     Sep 10, 2018 12:30 PM CDT   (Arrive by 12:15 PM)   Return Visit with Lucas Cutler MD   [facility] ([facility])    [address] [id number] [phone]              Who to contact     Please call your clinic at [phone] to:    Ask questions about your health    Make or cancel appointments    Discuss your medicines    Learn about your test results    Speak to your doctor            Additional Information About Your Visit        [employer] Information     [employer] gives you secure access to your electronic health record. If you see a primary care provider, you can also send messages to your care team and make appointments. If you have questions, please call your primary care clinic.  If you do not have a primary care provider, please call [phone] and they will assist you.      [employer] is an electronic gateway that provides easy, online access to your medical records. With [employer], you can request a clinic appointment, read your test results, renew a prescription or communicate with your care team.     To access your existing account, please contact your [facility] or call [phone] for assistance.        Care EveryWhere ID     This is your Care EveryWhere ID. This could be used by other organizations to access your [place] medical records  [id number]        Your Vitals Were     Pulse Temperature Respirations Pulse Oximetry BMI (Body Mass Index)       79 97.8  F (36.6  C) (Oral) 16 97% 17.36 kg/m2        Blood Pressure from Last 3 Encounters:   07/27/18 125/74   07/06/18 119/75   06/12/18 117/76    Weight from Last 3 Encounters:   07/27/18 131 lb 9.6 oz (59.7 kg)   07/06/18 129 lb (58.5 kg)   06/12/18 128 lb 6.4 oz (58.2 kg)              Today, you had the  "following     No orders found for display         Today's Medication Changes          These changes are accurate as of 7/27/18 10:23 AM.  If you have any questions, ask your nurse or doctor.               Stop taking these medicines if you haven't already. Please contact your care team if you have questions.     estradiol valerate 20 MG/ML injection   Commonly known as:  DELESTROGEN   Stopped by:  Won Ledesma MD           needle (disp) 18G X 1\" Misc   Stopped by:  Won Ledesma MD           Needle (Disp) 25G X 1-1/2\" Misc   Stopped by:  Won Ledesma MD           syringe (disposable) 1 ML Misc   Stopped by:  Won Ledesma MD                    Primary Care Provider Office Phone # Fax #    Won Ledesma [phone]       [facility] [address] [address]        Equal Access to Services     FER SHEARER : Hadii aad ku hadasho Soomaali, waaxda luqadaha, qaybta kaalmada adeegyada, waxay idiin hayaan divya ortegaarapaige garcian . So [facility] [phone].    ATENCIÓN: Si habla español, tiene a delgado disposición servicios gratuitos de asistencia lingüística. Rosie al [phone].    We comply with applicable federal civil rights laws and [place] laws. We do not discriminate on the basis of race, color, national origin, age, disability, sex, sexual orientation, or gender identity.            Thank you!     Thank you for choosing [facility]  for your care. Our goal is always to provide you with excellent care. Hearing back from our patients is one way we can continue to improve our services. Please take a few minutes to complete the written survey that you may receive in the mail after your visit with us. Thank you!             Your Updated Medication List - Protect others around you: Learn how to safely use, store and throw away your medicines at www.disposemymeds.org.          This list is accurate as " of 7/27/18 10:23 AM.  Always use your most recent med list.                   Brand Name Dispense Instructions for use Diagnosis    estradiol 2 MG tablet    ESTRACE    180 tablet    Take 4 tablets (8 mg) by mouth daily    Gender dysphoria in adult       finasteride 1 MG tablet    PROPECIA    90 tablet    Take 1 tablet (1 mg) by mouth daily    Alopecia       melatonin 3 MG tablet      Take 1 tablet (3 mg) by mouth nightly as needed for sleep        * spironolactone 50 MG tablet    ALDACTONE          * spironolactone 100 MG tablet    ALDACTONE    60 tablet    Take 2 tablets (200 mg) by mouth daily    Gender dysphoria in adolescent and adult       * Notice:  This list has 2 medication(s) that are the same as other medications prescribed for you. Read the directions carefully, and ask your doctor or other care provider to review them with you.

## 2018-07-27 NOTE — PROGRESS NOTES
Preceptor Attestation:   Patient seen, evaluated and discussed with the resident. I have verified the content of the note, which accurately reflects my assessment of the patient and the plan of care.   Supervising Physician:  Nasra Johns MD

## 2018-07-27 NOTE — PATIENT INSTRUCTIONS
Here is the plan from today's visit    There are no diagnoses linked to this encounter.    Please call or return to clinic if your symptoms don't go away.    Follow up plan  Please make a clinic appointment for follow up with me (WON SIMON) in 6     Thank you for coming to [facility] today.  Lab Testing:  **If you had lab testing today and your results are reassuring or normal they will be mailed to you or sent through Zazzy within 7 days.   **If the lab tests need quick action we will call you with the results.  The phone number we will call with results is # [phone] (home) . If this is not the best number please call our clinic and change the number.  Medication Refills:  If you need any refills please call your pharmacy and they will contact us.   If you need to  your refill at a new pharmacy, please contact the new pharmacy directly. The new pharmacy will help you get your medications transferred faster.   Scheduling:  If you have any concerns about today's visit or wish to schedule another appointment please call our office during normal business hours [phone] (8-5:00 M-F)  If a referral was made to a [facility] and you don't get a call from central scheduling please call [phone].  If a Mammogram was ordered for you at [facility] call [phone] to schedule or change your appointment.  If you had an XRay/CT/Ultrasound/MRI ordered the number is [phone] to schedule or change your radiology appointment.   Medical Concerns:  If you have urgent medical concerns please call [phone] at any time of the day.    Won Simon MD

## 2018-09-10 ENCOUNTER — ALLIED HEALTH/NURSE VISIT (OUTPATIENT)
Dept: FAMILY MEDICINE | Facility: CLINIC | Age: 28
End: 2018-09-10
Payer: COMMERCIAL

## 2018-09-10 DIAGNOSIS — F64.0 GENDER DYSPHORIA IN ADOLESCENT AND ADULT: Primary | ICD-10-CM

## 2018-09-10 NOTE — PROGRESS NOTES
"Patient presented to clinic with all supplies for IM injection teaching.     RN reviewed necessary supplies: difference in needles (drawing up vs injecting), how to read a syringe, how to read medication label, disposal of sharps, and proper hand hygiene.     Discussed how to switch out needles and patient demonstrated understanding of reading syringe. RN reviewed safe needle handling (using \"scoop\" method). Patient independently josselyn up proper amount of Delestrogen.     RN discussed site for IM injection and reviewed proper IM injection technique. Patient practiced using injecting pad.    At end of visit, patient independently completed self-injection of 0.5 mL (10 mg) Delestrogen into left thigh under supervision of RN. After injection pt looked pale and stated she felt dizzy. RN had pt put head between knees and put cool cloth on back of neck. Pt also had water. Pt's vitals were checked and were: /77 HR 67 O2 97%ra T 97.8F R 16. Pt remained in clinic for a little while and pt felt normal. RN stated if happens again should lay down right away and have someone with them. Pt didn't think they would need to come back to clinic next week for shot    Completed visit with discussion of refill policy.     Patient verbalized understanding and was engaged during appointment.    Dr. Rodríguez was the preceptor on site for this visit and available for questions.    Lulú Figueroa RN      "

## 2018-09-10 NOTE — MR AVS SNAPSHOT
After Visit Summary   9/10/2018    Carlos Yu    MRN: [id number]           Patient Information     Date Of Birth          1990        Visit Information        Provider Department      9/10/2018 1:00 PM Nurse, [facility]        Today's Diagnoses     Gender dysphoria in adolescent and adult    -  1       Follow-ups after your visit        Who to contact     Please call your clinic at [phone] to:    Ask questions about your health    Make or cancel appointments    Discuss your medicines    Learn about your test results    Speak to your doctor            Additional Information About Your Visit        MyChart Information     [employer] gives you secure access to your electronic health record. If you see a primary care provider, you can also send messages to your care team and make appointments. If you have questions, please call your primary care clinic.  If you do not have a primary care provider, please call [phone] and they will assist you.      [employer] is an electronic gateway that provides easy, online access to your medical records. With [employer], you can request a clinic appointment, read your test results, renew a prescription or communicate with your care team.     To access your existing account, please contact your [facility] or call [phone] for assistance.        Care EveryWhere ID     This is your Care EveryWhere ID. This could be used by other organizations to access your [place] medical records  [id number]         Blood Pressure from Last 3 Encounters:   07/27/18 125/74   07/06/18 119/75   06/12/18 117/76    Weight from Last 3 Encounters:   07/27/18 131 lb 9.6 oz (59.7 kg)   07/06/18 129 lb (58.5 kg)   06/12/18 128 lb 6.4 oz (58.2 kg)              Today, you had the following     No orders found for display       Primary Care Provider Office Phone # Fax #    Won Ledesma [phone]        [facility] [address] [address]        Equal Access to Services     MORRISFER MANFRED : Hadii blayne Mckenzie, wathaniada lili, qariley laddclaudecaren mcguire, papa ortegalorinpaige valerio. So [facility] [phone].    ATENCIÓN: Si habla español, tiene a delgado disposición servicios gratuitos de asistencia lingüística. Llame al [phone].    We comply with applicable federal civil rights laws and [place] laws. We do not discriminate on the basis of race, color, national origin, age, disability, sex, sexual orientation, or gender identity.            Thank you!     Thank you for choosing [facility]  for your care. Our goal is always to provide you with excellent care. Hearing back from our patients is one way we can continue to improve our services. Please take a few minutes to complete the written survey that you may receive in the mail after your visit with us. Thank you!             Your Updated Medication List - Protect others around you: Learn how to safely use, store and throw away your medicines at www.disposemymeds.org.          This list is accurate as of 9/10/18  2:17 PM.  Always use your most recent med list.                   Brand Name Dispense Instructions for use Diagnosis    estradiol 2 MG tablet    ESTRACE    120 tablet    Take 4 tablets (8 mg) by mouth daily    Gender dysphoria in adult       finasteride 1 MG tablet    PROPECIA    90 tablet    Take 1 tablet (1 mg) by mouth daily    Alopecia       melatonin 3 MG tablet      Take 1 tablet (3 mg) by mouth nightly as needed for sleep        * spironolactone 50 MG tablet    ALDACTONE          * spironolactone 100 MG tablet    ALDACTONE    60 tablet    Take 2 tablets (200 mg) by mouth daily    Gender dysphoria in adolescent and adult       * Notice:  This list has 2 medication(s) that are the same as other medications prescribed for you. Read the directions carefully, and ask your  doctor or other care provider to review them with you.

## 2018-09-25 ENCOUNTER — MYC REFILL (OUTPATIENT)
Dept: FAMILY MEDICINE | Facility: CLINIC | Age: 28
End: 2018-09-25

## 2018-09-25 DIAGNOSIS — F64.0 GENDER DYSPHORIA IN ADOLESCENT AND ADULT: ICD-10-CM

## 2018-09-25 RX ORDER — SPIRONOLACTONE 100 MG/1
200 TABLET, FILM COATED ORAL DAILY
Qty: 60 TABLET | Refills: 5 | OUTPATIENT
Start: 2018-09-25

## 2018-09-25 NOTE — TELEPHONE ENCOUNTER
Message from MyChart:  Original authorizing provider: MD Carlos Mg She/Her [place] would like a refill of the following medications:  spironolactone (ALDACTONE) 100 MG tablet [Christophe Hernandez MD]    Preferred pharmacy: [place], [place] - [address]    Comment:

## 2018-10-23 ENCOUNTER — OFFICE VISIT (OUTPATIENT)
Dept: FAMILY MEDICINE | Facility: CLINIC | Age: 28
End: 2018-10-23
Payer: COMMERCIAL

## 2018-10-23 VITALS
WEIGHT: 132.2 LBS | BODY MASS INDEX: 17.44 KG/M2 | RESPIRATION RATE: 16 BRPM | HEART RATE: 92 BPM | OXYGEN SATURATION: 97 % | SYSTOLIC BLOOD PRESSURE: 107 MMHG | DIASTOLIC BLOOD PRESSURE: 72 MMHG

## 2018-10-23 DIAGNOSIS — F64.9 GENDER DYSPHORIA: Primary | ICD-10-CM

## 2018-10-23 DIAGNOSIS — F64.0 GENDER DYSPHORIA IN ADOLESCENT AND ADULT: ICD-10-CM

## 2018-10-23 RX ORDER — NEEDLES, DISPOSABLE 25GX5/8"
NEEDLE, DISPOSABLE MISCELLANEOUS
COMMUNITY
Start: 2018-09-07 | End: 2019-08-12

## 2018-10-23 RX ORDER — SYRINGE, DISPOSABLE, 1 ML
SYRINGE, EMPTY DISPOSABLE MISCELLANEOUS
COMMUNITY
Start: 2018-09-07 | End: 2019-08-12

## 2018-10-23 RX ORDER — SPIRONOLACTONE 100 MG/1
200 TABLET, FILM COATED ORAL DAILY
Qty: 60 TABLET | Refills: 5 | Status: SHIPPED | OUTPATIENT
Start: 2018-10-23 | End: 2019-06-11

## 2018-10-23 RX ORDER — ESTRADIOL VALERATE 20 MG/ML
INJECTION INTRAMUSCULAR
COMMUNITY
Start: 2018-10-02 | End: 2018-10-23

## 2018-10-23 RX ORDER — NEEDLES, DISPOSABLE 25GX5/8"
NEEDLE, DISPOSABLE MISCELLANEOUS
COMMUNITY
Start: 2018-09-07 | End: 2019-08-09

## 2018-10-23 RX ORDER — ESTRADIOL VALERATE 20 MG/ML
10 INJECTION INTRAMUSCULAR WEEKLY
Qty: 5 ML | Refills: 1 | Status: SHIPPED | OUTPATIENT
Start: 2018-10-23 | End: 2019-03-06

## 2018-10-23 NOTE — PATIENT INSTRUCTIONS
Individual Mental Health Practitioners   Therapist Children Adolescents Adults Family Other   Tawanda Malikell, [facility], DCSW  [address]  [facility] [address]  Work Phone: [phone]   YES YES    Shannon Diop PsyD, LP  [address]  [place]  [address]  PH: [phone]   YES YES    Diana Ledezma PsyD, [facility]  [facility]  [address]  [place], [place]   PH:[phone] YES YES YES YES Business consulting   Haris Kulkarni, PhD, LP  [address]  [place] [address]  PH: [phone]  MADS  Older Adolescents YES  On busline   Aubrey Wilkins, [facility]  [address]  [place] [address]  PH: [phone]  [employer]  YES YES YES Busline   Genevieve Coates M.Ed, [facility], [facility]  [address]  [place], [place]   [phone]  [email or web address]   YES YES  Experienced in Trans Veterans    Nusrat Dove MA, LMFT, CST  [address]  [place], [place] [address] [phone]  [employer]  YES YES YES Sex positive therapy    NALDO Pratt MFA, Good, LP   [address]  [place] [address] [phone]  Sexfromthecenter.Accessory Addict Society   YES YES Sexuality groups  [facility]        Mental Health [facility]   Patients: Children, Teen, Adult, Families, Couples  [address]  [place] [address] [phone] fax  Traetelo.com.Mobile Service [facility]  Patients: Children, adolescents, adults  Locations:  [address]  [place] [address] -[address]  [place] [address] -[address]  [place] [address]  [place] [address]    Appointment Scheduling   [phone]  https://www.arviem AGSt. Elizabeth Hospital.Accessory Addict Society  [facility]  All ages- Also offers in-home therapy and sliding fee  [address]  [place] [address]   Phone:  Surgery [phone]  Aircell HoldingsGreen Cross HospitalZaggora    [facility] [place] [address]  Intake line [phone]  http://www.theSaug[facility] General Hospitallypartnership.org/programsservices/counseling/transgender-mental-health/    Transgender Mental Health Team  Therapists Children Adolescents Adults Family Other information   Christine([place]) Edvin Joyce, MSW,LICSW YES YES YES  Beninese and English  [facility] Location- [facility]   Sharmila Stahl MA, LMFT YES YES YES  [facility] Location   Ly Valdivia, MSW, LICSW   YES YES EMDR  [place] Location     Andrea Antunez MA, LP YES YES YES YES [facility] Location- [facility] Sexual medicine-Psychology  Parknicollet.com  [place] Location   [address] [place] [address]  Phone [phone]  Therapists Children Adolescents Adults Family Other   Joan Grande PsyD, LP   YES YES-couples    Dony Kinney PsyD, LP   YES     Joseline Wiseman, , LP   YES       [place] Location  [address] [place] [address]  Phone: [phone]  Therapists Children Adolescents Adults Family Other   Nikki Mcdonald PsyD, LP   YES YES    Yara Stephenson PsyD, LP   YES       RECLAIM  Patients-Queer and Trans Youth and Family counseling serving ages 12-26  [address] [place] [address]  Phone: [phone]  http://Reclaim.[facility]  Medical Arts Building  [address]  [place] [address]  Main line: [phone]  http://Mclowdpsych."[employer]"    [facility] for Sexual Health/ Program in Human Sexuality  [address]  [place] [address]  PH: [phone]   https://www.sexualhealth.[facility].edu/clinic-center-sexual-health/transgender-health-services    Therapists Children Adolescents Adults Family Other information   Jennifer Acuña PsyD YES YES YES     Nidhi Gonzales, PhD YES YES YES     Yusra Morales PsyD  YES YES      Mirela Marin, PhD YES YES YES     Ruy Hightower, PhD, MPH YES YES   Sexual and Gender minority health   Multiple other Post-Doctoral fellows practicing at this location as well           [facility] Location: [address] [place] [address]  [place] Location: [address] [address]  Toll Free: [phone]  Phone: [phone]   http://www.[facility]/therapeutic-services    Daytime LGBTQ+ DBT skills group  Groups are being added at [facility] for Psychology, in addition to the current evening LGBTQ+ skills group. Both skills groups are held on Mondays.  Daytime group will be 1:00pm-3:30pm and evening group (currently full) is 5:00pm-7:30pm.  [facility] offers a certified DBT program. Clients commit to one year of participation in skills group and individual DBT therapy. DBT individual therapy can be done by current therapist if the therapist is intensively trained and part of consultation group of intensively trained therapists, and if the therapist offers 24/7 coaching calls. Otherwise, the client can work with one of our DBT therapists and then return to their previous individual therapist at the conclusion of the 1-year program.  P Member Sonal Sparrow MA, [facility]   [phone]  www.[facility] Center[facility].com  [address], [place], [place]     Chemical Health Resources    [facility]  [address] [place] [address]  Phone: [phone] or toll free, [phone]  http://PhyFlex Networks/    Latitudes- LGBT Residential CD Treatment Program  [address] [place] [address]  [phone]  http://www.PeeplePass/programs/residential/cpuedfmjd-lcly-seznpiktchc-[facility]-[place]/    Additional Provider Directories  [facility]- Provider Directory for all services  http://www.[facility].org/resources-for-you/provider-directory/    [place]'s lesbian alba bisexual transgender &  allied mental health providers' network  http://www.lgbttherapists.org/    Provider Directory for Health Related resources in [place]  http://mnlgbtqdirectory.org    Directory for kink, Polyamory, gender non-conforming aware Providers  http://www.amirah.org      Here is the plan from today's visit    1. Gender dysphoria  Will inform you via [facility] results, may need to increase, and if we do, then will plan for 15mg   - Testosterone Total    Please call or return to clinic if your symptoms don't go away.    Follow up plan  Please make a clinic appointment for follow up with your primary physician Won Ledesma MD in 2-6 months.    Thank you for coming to [facility] today.  Lab Testing:  **If you had lab testing today and your results are reassuring or normal they will be mailed to you or sent through [employer] within 7 days.   **If the lab tests need quick action we will call you with the results.  The phone number we will call with results is # [phone] (home) . If this is not the best number please call our clinic and change the number.  Medication Refills:  If you need any refills please call your pharmacy and they will contact us.   If you need to  your refill at a new pharmacy, please contact the new pharmacy directly. The new pharmacy will help you get your medications transferred faster.   Scheduling:  If you have any concerns about today's visit or wish to schedule another appointment please call our office during normal business hours [phone] (8-5:00 M-F)  If a referral was made to a [facility] and you don't get a call from central scheduling please call [phone].  If a Mammogram was ordered for you at [facility] call [phone] to schedule or change your appointment.  If you had an XRay/CT/Ultrasound/MRI ordered the number is [phone] to schedule or change your radiology appointment.   Medical Concerns:  If you have urgent medical concerns  please call [phone] at any time of the day.    Milagros Yost, DO

## 2018-10-23 NOTE — MR AVS SNAPSHOT
After Visit Summary   10/23/2018    Carlos Yu    MRN: [id number]           Patient Information     Date Of Birth          1990        Visit Information        Provider Department      10/23/2018 9:20 AM Milagros Yost DO [facility]        Today's Diagnoses     Gender dysphoria    -  1    Gender dysphoria in adolescent and adult          Care Instructions       Individual Mental Health Practitioners   Therapist Children Adolescents Adults Family Other   Tawanda Malikell, [facility], DCSW  [address]  [facility] [address]  Work Phone: [phone]   YES YES    Shannon Diop PsyD, LP  [address]  [place]  [address]  PH: [phone]   YES YES    Diana Ledezma PsyD, [facility]  [facility]  [address]  [place], [place]   PH:[phone] YES YES YES YES Business consulting   Haris Kulkarni, PhD, LP  [address]  [place] [address]  PH: [phone]  P[employer]  Older Adolescents YES  [facility]   [facility]ub[facility] [place] [facility]  [address]  [place] [address]  PH: [phone]  Spiced Bits.Panorama9  YES YES YES [facility]   Genevieve Coates M.Ed, [facility], [facility]  [address] [place]   [phone]  [email or web address]   YES YES  Experienced in Trans Veterans    Nusrat Dove MA, LMFT, CST  [address]  [place], [place] [address] [phone]  [facility] HospitalVringo.Panorama9  YES YES YES Sex positive therapy    NALDO Pratt MFA, PsRinku, LP   [address]  [address]  [place] [address] [phone]  Sexfromthecenter.Panorama9   YES YES Sexuality groups  [facility]        Mental Health [facility]   Patients: Children, Teen, Adult, Families, Couples  [address]  [place] [address] [phone] fax  East RutherfordNextFitLos Banos Community HospitalVringo.Panorama9    [facility]  Patients: Children, adolescents, adults  Locations:  [address]  [place] [address] -[address]  [place] [address]  [address]  [place] [address]  [place] [address]    Appointment Scheduling   [phone]  https://www.PublicateMayo Clinic Arizona ([facility])[employer]ox[employer] Corporation  [facility]  All ages- Also offers in-home therapy and sliding fee  [address]  [place] [address]   Phone: [phone]  Metamark GeneticsWaverly Health CenterOryon Technologies    [facility]-[address] [place] [address]  Intake line [phone]  http://www.[facility].org/programsservices/counseling/transgender-mental-health/    Transgender Mental Health Team  Therapists Children Adolescents Adults Family Other information   Christine([place]) Edvin Joyce, MSW,LICSW YES YES YES  Nepali and English  [facility] Location- [facility]   Sharmila Stahl MA, LMFT YES YES YES  [facility]   Ly Valdivia MSW, LICSW   YES YES EMDR  [facility] Location     Andrea Antunez MA, LP YES YES YES YES [facility] Location- [facility]     IRI Group Holdingset Sexual medicine-Psychology  Parknicollet.com  [place] Location   [address] [place] [address]  Phone [phone]  Therapists Children Adolescents Adults Family Other   Joan Grande PsyD, LP   YES YES-couples    Dony Kinney PsyD, LP   YES     Joseline Wiseman, PhD, LP   YES       [place] Location  [address] [place] [place] [address]  Phone: [phone]  Therapists Children Adolescents Adults Family Other   Nikki Mcdonald PsyD, LP   YES YES    Yara Stephenson PsyD, LP   YES       RECLAIM  Patients-Queer and Trans Youth and Family counseling serving ages 12-26  [address] [place] [address]  Phone: [phone]  http://Reclaim.[facility] Psychological Services, [facility]  [employer]ah[employer] Arts Building  [address]  [place] [address]  Main line: [phone]  http://IP Fabricspsych.Kony    [facility]  [place] for Sexual Health/ Program in Human Sexuality  [address]  [place] [address]  PH: [phone]   https://www.sexualhealth.[facility].edu/clinic-center-sexual-health/transgender-health-services    Therapists Children Adolescents Adults Family Other information   Jennifer Acuña PsyD YES YES YES     Nidhi Gonzales, PhD YES YES YES     Yusra Morales, Good  YES YES     Mirela Marin, PhD YES YES YES     Ruy Hightower, PhD, MPH YES YES   Sexual and Gender minority health   Multiple other Post-Doctoral fellows practicing at this location as well           [facility] Location: [address] [place] [address]  [place] Location: [place] [address] [place] [address]  Toll Free: [phone]  Phone: [phone]   http://www.[facility]/therapeutic-services    Daytime LGBTQ+ DBT skills group  Groups are being added at [facility] for Psychology, in addition to the current evening LGBTQ+ skills group. Both skills groups are held on Mondays.  Daytime group will be 1:00pm-3:30pm and evening group (currently full) is 5:00pm-7:30pm.  [facility] offers a certified DBT program. Clients commit to one year of participation in skills group and individual DBT therapy. DBT individual therapy can be done by current therapist if the therapist is intensively trained and part of consultation group of intensively trained therapists, and if the therapist offers 24/7 coaching calls. Otherwise, the client can work with one of our DBT therapists and then return to their previous individual therapist at the conclusion of the 1-year program.  MWP Member Sonal Sparrow MA, [facility] for Psychology   [phone]  www.[facility] Hospital[facility].com  [address], [place], [place]     Chemical Health Resources    [facility]  [address] [place] [address]  Phone: [phone] or toll free, [phone]  http://STERIS Corporation/    Latitudes- LGBT  Residential CD Treatment Program  [address] [place] [address]  [phone]  http://www.Executive Channel.BookingPal/programs/residential/ktbbcxspk-ohzd-ndvbsmcataq-[facility]-mn/    Additional Provider Directories  [facility]- Provider Directory for all services  http://www.[facility].org/resources-for-you/provider-directory/    [place]'s lesbian alba bisexual transgender & allied mental health providers' network  http://www.lgbttherapists.org/    Provider Directory for Health Related resources in [place]  http://mnlgbtqdirectory.org    Directory for kink, Polyamory, gender non-conforming aware Providers  http://www.kinkaware.org      Here is the plan from today's visit    1. Gender dysphoria  Will inform you via Mazoom results, may need to increase, and if we do, then will plan for 15mg   - Testosterone Total    Please call or return to clinic if your symptoms don't go away.    Follow up plan  Please make a clinic appointment for follow up with your primary physician Won Ledesma MD in 2-6 months.    Thank you for coming to [facility] today.  Lab Testing:  **If you had lab testing today and your results are reassuring or normal they will be mailed to you or sent through Quixey within 7 days.   **If the lab tests need quick action we will call you with the results.  The phone number we will call with results is # [phone] (home) . If this is not the best number please call our clinic and change the number.  Medication Refills:  If you need any refills please call your pharmacy and they will contact us.   If you need to  your refill at a new pharmacy, please contact the new pharmacy directly. The new pharmacy will help you get your medications transferred faster.   Scheduling:  If you have any concerns about today's visit or wish to schedule another appointment please call our office during normal business hours [phone] (8-5:00 M-F)  If a referral was made to a [facility]  [place] [facility] and you don't get a call from central scheduling please call [phone].  If a Mammogram was ordered for you at [facility] call [phone] to schedule or change your appointment.  If you had an XRay/CT/Ultrasound/MRI ordered the number is [phone] to schedule or change your radiology appointment.   Medical Concerns:  If you have urgent medical concerns please call [phone] at any time of the day.    Milagros Yost, DO            Follow-ups after your visit        Who to contact     Please call your clinic at [phone] to:    Ask questions about your health    Make or cancel appointments    Discuss your medicines    Learn about your test results    Speak to your doctor            Additional Information About Your Visit        Circle Pharmahar[employer] Communications Information     Fly Media gives you secure access to your electronic health record. If you see a primary care provider, you can also send messages to your care team and make appointments. If you have questions, please call your primary care clinic.  If you do not have a primary care provider, please call [phone] and they will assist you.      Fly Media is an electronic gateway that provides easy, online access to your medical records. With [employer] Media, you can request a clinic appointment, read your test results, renew a prescription or communicate with your care team.     To access your existing account, please contact your [facility] or call [phone] for assistance.        Care EveryWhere ID     This is your Care EveryWhere ID. This could be used by other organizations to access your [place] medical records  [id number]        Your Vitals Were     Pulse Respirations Pulse Oximetry BMI (Body Mass Index)          92 16 97% 17.44 kg/m2         Blood Pressure from Last 3 Encounters:   10/23/18 107/72   07/27/18 125/74   07/06/18 119/75    Weight from Last 3 Encounters:   10/23/18 132 lb 3.2 oz (60 kg)   07/27/18 131  lb 9.6 oz (59.7 kg)   07/06/18 129 lb (58.5 kg)              We Performed the Following     Testosterone Total          Today's Medication Changes          These changes are accurate as of 10/23/18 10:23 AM.  If you have any questions, ask your nurse or doctor.               These medicines have changed or have updated prescriptions.        Dose/Directions    estradiol valerate 20 MG/ML injection   Commonly known as:  DELESTROGEN   This may have changed:    - how much to take  - how to take this  - when to take this   Used for:  Gender dysphoria   Changed by:  Milagros Yost,         Dose:  10 mg   Inject 0.5 mLs (10 mg) into the muscle once a week   Quantity:  5 mL   Refills:  1            Where to get your medicines      These medications were sent to [facility] [place], [place] - [address] [facility], [place] [address]     Phone:  [phone]     estradiol valerate 20 MG/ML injection    spironolactone 100 MG tablet                Primary Care Provider Office Phone # Fax #    Won Ledesma [phone]       [facility] [address]  [facility] [address]        Equal Access to Services     ALIA SHEARER : Hadii blayne duckworth Sotreva, waaxda luqadaha, qaybta kaalmada adeabhishekyacaren, papa miramontes . So [facility] [phone].    ATENCIÓN: Si habla español, tiene a delgado disposición servicios gratuitos de asistencia lingüística. Llame al [phone].    We comply with applicable federal civil rights laws and [place] laws. We do not discriminate on the basis of race, color, national origin, age, disability, sex, sexual orientation, or gender identity.            Thank you!     Thank you for choosing [facility]  for your care. Our goal is always to provide you with excellent care. Hearing back from our patients is one way we can continue to improve our services. Please take a few minutes to complete  "the written survey that you may receive in the mail after your visit with us. Thank you!             Your Updated Medication List - Protect others around you: Learn how to safely use, store and throw away your medicines at www.disposemymeds.org.          This list is accurate as of 10/23/18 10:23 AM.  Always use your most recent med list.                   Brand Name Dispense Instructions for use Diagnosis    B-D SYRINGE LUER-SHWETA 1 ML Misc   Generic drug:  syringe (disposable)           BD HYPODERMIC NEEDLE 18G X 1\" Misc   Generic drug:  needle (disp)           BD HYPODERMIC NEEDLE 25G X 1-1/2\" Misc   Generic drug:  Needle (Disp)           estradiol 2 MG tablet    ESTRACE    120 tablet    Take 4 tablets (8 mg) by mouth daily    Gender dysphoria in adult       estradiol valerate 20 MG/ML injection    DELESTROGEN    5 mL    Inject 0.5 mLs (10 mg) into the muscle once a week    Gender dysphoria       finasteride 1 MG tablet    PROPECIA    90 tablet    Take 1 tablet (1 mg) by mouth daily    Alopecia       melatonin 3 MG tablet      Take 1 tablet (3 mg) by mouth nightly as needed for sleep        * spironolactone 50 MG tablet    ALDACTONE          * spironolactone 100 MG tablet    ALDACTONE    60 tablet    Take 2 tablets (200 mg) by mouth daily    Gender dysphoria in adolescent and adult       * Notice:  This list has 2 medication(s) that are the same as other medications prescribed for you. Read the directions carefully, and ask your doctor or other care provider to review them with you.      "

## 2018-10-23 NOTE — PROGRESS NOTES
"       HPI   Carlos She/Her is a 28 year old individual that uses pronouns She/Her/Hers/Herself that presents today for follow up of:  feminizing hormone therapy. Gender identity: female    Patient's preferred name is incorrectly documented in the patient's chart, patient prefers the spelling Kelsi (\"[id number]\")    Switched from pill to injection 9/10/2018    Any special concerns today?  concerns about   - Requesting refill spironolactone and estrogen injections  - Wondering if we should check testosterone since being on injectable estrogen for about 6 weeks  - Wanting to start process of orchiectomy: already in touch with gender , informed needs two letters (PCP + therapist).  Patient extremely motivated to have an orchiectomy, as she is not a fan of Spironolactone, and wishes to be off of this soon, and also feels her testosterone will be sufficiently suppressed if she has an orchiectomy.    On hormones?  YES +++ Shot day of the week? Monday      Due for labs?  testosterone      +++ Refills of meds needed?  Yes  ---    Past Surgical History:   Procedure Laterality Date     NO HISTORY OF SURGERY       Patient Active Problem List   Diagnosis     Gender dysphoria in adolescent and adult     Mild intermittent asthma     Alopecia     Circadian dysregulation     Current Outpatient Prescriptions   Medication Sig Dispense Refill     B-D SYRINGE LUER-SHWETA 1 ML MISC        BD HYPODERMIC NEEDLE 18G X 1\" MISC        BD HYPODERMIC NEEDLE 25G X 1-1/2\" MISC        estradiol valerate (DELESTROGEN) 20 MG/ML injection        finasteride (PROPECIA) 1 MG tablet Take 1 tablet (1 mg) by mouth daily 90 tablet 1     melatonin 3 MG tablet Take 1 tablet (3 mg) by mouth nightly as needed for sleep       spironolactone (ALDACTONE) 100 MG tablet Take 2 tablets (200 mg) by mouth daily 60 tablet 5     spironolactone (ALDACTONE) 50 MG tablet        estradiol (ESTRACE) 2 MG tablet Take 4 tablets (8 mg) by mouth daily " "(Patient not taking: Reported on 10/23/2018) 120 tablet 5     History   Smoking Status     Never Smoker   Smokeless Tobacco     Never Used      No Known Allergies    Problem, Medication and Allergy Lists were reviewed and are current..         Review of Systems:      General    Fat redistribution: no    Weight change: slight if any HEENT    Voice change: no     Cardiovascular (CV)    Chest Pains: no    Shortness of breath: no Chest    Decreased exercise tolerance:  Upper body strength feels less    Breast changes/development: slow development, but ongoing     Gastrointestinal (GI)    Abdominal pain: no    Change in appetite: no Skin    Acne or oily skin: no    Change in hair: no     Genitourinary ()    Abnormal vaginal bleeding: not applicable     Decreased spontaneous erections: no    Change in libido: low, but unchanged    New sexual partners: no Musculoskeletal    Leg pain or swelling: no     Psychiatric (Psych)    Depression: no    Anxiety/Panic: no    Mood:  \"stressed\" about finding new job, 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   Wt Readings from Last 10 Encounters:   10/23/18 132 lb 3.2 oz (60 kg)   07/27/18 131 lb 9.6 oz (59.7 kg)   07/06/18 129 lb (58.5 kg)   06/12/18 128 lb 6.4 oz (58.2 kg)   04/10/18 130 lb (59 kg)   01/02/18 132 lb 12.8 oz (60.2 kg)   12/11/17 127 lb 9.6 oz (57.9 kg)   11/14/17 130 lb (59 kg)   10/18/17 130 lb (59 kg)   09/14/17 129 lb (58.5 kg)     Appearance: Female appearance and dress    Physical Exam   Constitutional: He is well-developed, well-nourished, and in no distress. No distress.   Cardiovascular: Normal rate.    Pulmonary/Chest: Effort normal. No respiratory distress.   Musculoskeletal: " He exhibits no edema.   Neurological: He is alert.   Skin: He is not diaphoretic.   Psychiatric: Mood, memory, affect and judgment normal.   Nursing note and vitals reviewed.    Affect: Appropriate/mood-congruent           Labs:    Results from the last 24 hoursNo results found for this or any previous visit (from the past 24 hour(s)).  Testosterone pending    Assessment and Plan     Carlos was seen today for recheck.    Diagnoses and all orders for this visit:    Gender dysphoria  -     Testosterone Total  -     estradiol valerate (DELESTROGEN) 20 MG/ML injection; Inject 0.5 mLs (10 mg) into the muscle once a week  -     spironolactone (ALDACTONE) 100 MG tablet; Take 2 tablets (200 mg) by mouth daily    Patient due for refills of medications, a refill for estrogen injection as well as Spironolactone.  Estrogen at current prescribed dose of 10 mg, weekly.  Discussed with patient about the need for possible increase of this dose, will await for testosterone lab testing done today.  If this shows insufficient suppression of testosterone, will recommend increasing estradiol.  Patient hesitant to increase up to 20 mg, as she has not made any friends or read online of other patients on that high of a dose, she would prefer to increase just to 15 mg, if her testosterone continues to be insufficiently suppressed.    Patient also requesting a letter by her primary care physician, will send a message to Dr. Ledesma to initiate this process.  Provided patient with a list of therapist she can follow-up with her a letter of support.  Patient will contact these providers, and return if she has any further questions.    Follow up:  Follow up in 1-2 months to recheck testosterone, IF we need to increase estradiol dose, if current 10mg estradiol dose insufficient, then recommending follow-up in 4-6 months.  Results by veronica  Questions were elicited and answered.     Milagros Yost, DO

## 2018-10-23 NOTE — Clinical Note
Ion Upton, I saw your patient today.  I check testosterone, the level is currently pending.  I will follow-up on this, and if testosterone is still insufficiently suppressed, will increase estradiol dose to 15 mg, as patient is requesting to do 15 instead of the recommended 20, as she is worried.   Also, patient is requesting a letter of support for orchiectomy by her PCP, which is you! I provided her with a list of therapists for the second letter of support.  Milagros Yost, DO PGY2 Family Medicine Resident Pager: [phone]

## 2018-10-23 NOTE — PROGRESS NOTES
Preceptor Attestation:   Patient seen, evaluated and discussed with the resident. I have verified the content of the note, which accurately reflects my assessment of the patient and the plan of care.   Supervising Physician:  Davion Rose MD

## 2018-10-25 LAB — TESTOST SERPL-MCNC: 13 NG/DL (ref 240–950)

## 2018-11-05 DIAGNOSIS — F64.9 GENDER DYSPHORIA: Primary | ICD-10-CM

## 2018-12-13 DIAGNOSIS — F64.9 GENDER DYSPHORIA: ICD-10-CM

## 2018-12-13 LAB
ANION GAP SERPL CALCULATED.3IONS-SCNC: 7 MMOL/L (ref 3–14)
BUN SERPL-MCNC: 7 MG/DL (ref 7–30)
CALCIUM SERPL-MCNC: 9.3 MG/DL (ref 8.5–10.1)
CHLORIDE SERPL-SCNC: 105 MMOL/L (ref 94–109)
CO2 SERPL-SCNC: 26 MMOL/L (ref 20–32)
CREAT SERPL-MCNC: 0.73 MG/DL (ref 0.66–1.25)
GFR SERPL CREATININE-BSD FRML MDRD: >90 ML/MIN/1.7M2
GLUCOSE SERPL-MCNC: 84 MG/DL (ref 70–99)
POTASSIUM SERPL-SCNC: 4.6 MMOL/L (ref 3.4–5.3)
SODIUM SERPL-SCNC: 137 MMOL/L (ref 133–144)

## 2018-12-13 NOTE — LETTER
January 2, 2019      Carlos Yu  [address] [place] [id number]        Dear Carlos,    Thank you for getting your care at [facility]. Please see below for your test results.    Resulted Orders   Basic metabolic panel   Result Value Ref Range    Sodium 137 133 - 144 mmol/L    Potassium 4.6 3.4 - 5.3 mmol/L    Chloride 105 94 - 109 mmol/L    Carbon Dioxide 26 20 - 32 mmol/L    Anion Gap 7 3 - 14 mmol/L    Glucose 84 70 - 99 mg/dL    Urea Nitrogen 7 7 - 30 mg/dL    Creatinine 0.73 0.66 - 1.25 mg/dL    GFR Estimate >90 >60 mL/min/1.7m2      Comment:      Non  GFR Calc    GFR Estimate If Black >90 >60 mL/min/1.7m2      Comment:       GFR Calc    Calcium 9.3 8.5 - 10.1 mg/dL     Your labs look good.  No issues with kidneys.    If you have any concerns about these results please call and leave a message for me or send a Meedort message to the clinic.    Sincerely,    Pablo Stewart MD

## 2018-12-18 LAB
SHBG SERPL-SCNC: 170 NMOL/L (ref 11–80)
TESTOST FREE SERPL-MCNC: 0.05 NG/DL (ref 4.7–24.4)
TESTOST SERPL-MCNC: 12 NG/DL (ref 240–950)

## 2019-01-02 ENCOUNTER — MYC REFILL (OUTPATIENT)
Dept: ORTHOPEDICS | Facility: CLINIC | Age: 29
End: 2019-01-02

## 2019-01-02 DIAGNOSIS — L65.9 ALOPECIA: ICD-10-CM

## 2019-01-03 NOTE — TELEPHONE ENCOUNTER

## 2019-01-08 ENCOUNTER — MYC REFILL (OUTPATIENT)
Dept: FAMILY MEDICINE | Facility: CLINIC | Age: 29
End: 2019-01-08

## 2019-01-08 DIAGNOSIS — L65.9 ALOPECIA: ICD-10-CM

## 2019-01-08 RX ORDER — FINASTERIDE 1 MG/1
1 TABLET, FILM COATED ORAL DAILY
Qty: 90 TABLET | Refills: 1 | Status: SHIPPED | OUTPATIENT
Start: 2019-01-08

## 2019-01-08 RX ORDER — FINASTERIDE 1 MG/1
1 TABLET, FILM COATED ORAL DAILY
Qty: 90 TABLET | Refills: 1 | Status: SHIPPED | OUTPATIENT
Start: 2019-01-08 | End: 2019-07-15

## 2019-03-06 ENCOUNTER — MYC REFILL (OUTPATIENT)
Dept: FAMILY MEDICINE | Facility: CLINIC | Age: 29
End: 2019-03-06

## 2019-03-06 DIAGNOSIS — F64.9 GENDER DYSPHORIA: ICD-10-CM

## 2019-03-07 RX ORDER — ESTRADIOL VALERATE 20 MG/ML
10 INJECTION INTRAMUSCULAR WEEKLY
Qty: 5 ML | Refills: 1 | Status: SHIPPED | OUTPATIENT
Start: 2019-03-07 | End: 2019-04-16

## 2019-03-20 ENCOUNTER — OFFICE VISIT (OUTPATIENT)
Dept: FAMILY MEDICINE | Facility: CLINIC | Age: 29
End: 2019-03-20
Payer: COMMERCIAL

## 2019-03-20 ENCOUNTER — MEDICAL CORRESPONDENCE (OUTPATIENT)
Dept: HEALTH INFORMATION MANAGEMENT | Facility: CLINIC | Age: 29
End: 2019-03-20

## 2019-03-20 ENCOUNTER — TELEPHONE (OUTPATIENT)
Dept: PSYCHOLOGY | Facility: CLINIC | Age: 29
End: 2019-03-20

## 2019-03-20 VITALS
TEMPERATURE: 97.8 F | BODY MASS INDEX: 17.42 KG/M2 | SYSTOLIC BLOOD PRESSURE: 114 MMHG | WEIGHT: 132 LBS | DIASTOLIC BLOOD PRESSURE: 74 MMHG | OXYGEN SATURATION: 97 % | HEART RATE: 84 BPM

## 2019-03-20 DIAGNOSIS — F64.0 GENDER DYSPHORIA IN ADOLESCENT AND ADULT: Primary | ICD-10-CM

## 2019-03-20 NOTE — LETTER
[facility]   [address] [place] [address],  [address]  [facility] [address]  Phone: [phone]  Fax: [phone]    3/20/2019      To Whom It May Concern  Re: Carlos Yu  : 1990     I am writing in support of my patient, Carlos Yu, who will be undergoing gender affirmation surgery with Surgeon Dr. Tidwell and/or his colleagues, for the condition of gender dysphoria in adolescents or adults. The proposed procedure is: Bilateral orchiectomy.    Carlos is under my care and I have reviewed their medical history in detail. The patient has undergone therapeutic evaluation with me since 2017. Carlos began feminizing hormone therapy with estrogen  in 2017, and has been on this medication continuously since, with excellent clinical response.     Their initial and evolving gender, sexual, and other psychiatric diagnoses are included here: gender dysphoria in adolescents or adultscontrolled.    The patient has followed the WPATH Standards of Care to a high degree and is likely to continue to comply. Clinical rationale for surgery is gender affirmation, to better align the external body with the internal gender.    Carlos has met all the eligibility criteria for the proposed surgery includin. Legal age of majority in the patient s nation.  2. If appropriate, 12 months of continuous hormonal therapy for those without a medical contraindication.  3. Living continuously in a gender role congruent with gender identity for greater than 12 months. WPATH standards do not demand this criteria, but this has been the community standard to assess resolve to fulfill an unambiguous transition.  4. If required by a mental health professional, regular responsible participation in psychotherapy, at a frequency determined jointly by the patient and the mental health professional. Psychotherapy per se is not an absolute eligibility criterion for surgery.  5. Availability of a mental health  professional; I am also available to the patient and happy to accept a phone call or electronic health record message to establish the veracity of this relationships.   6. Discussion of the risks, benefits and alternatives to this surgical intervention. This discussion should include the insurance situation, especially as is related to unforseen complications that may require travel. This means: the patient has demonstrable knowledge of the cost, required lengths of hospitalizations, likely complications, and post surgical rehabilitation requirements of various surgical approaches, and awareness of different competent surgeons. There are stable economic, logistical and emotional supports that will be in place during the perioperative period.  The patient lives in [place] with her brother which is close proximity to hospital and surgeon who will be performing surgery.  The patient will be able to arrange for transportation to and from required follow up appointments. The patient will follow up with both the surgeon and myself after surgery to assess her progress.     These criteria are consistent with the World Professional Association for Transgender Health s Standards of Care for the Health of Transsexual, Transgender and Gender Nonconforming People, and support this procedure as medically necessary for Benjamins overall health.      If you have any questions, please contact me or one of our care coordinators in clinic.           Respectfully,            Won Ledesma MD

## 2019-03-20 NOTE — TELEPHONE ENCOUNTER
Patient given resources in session.       Individual Mental Health Practitioners   Therapist Children Adolescents Adults Family Other   Tawanda Sullivan, [facility], DCSW  [address]  [facility] [address]  Work Phone: [phone]   YES YES    Shannon Diop PsyD, LP  [address]  [place]  [address]  PH: [phone]   YES YES    Diana Ledezma PsyD, [facility]  [facility]  [address]  [place], [place]   PH:[phone] YES YES YES YES Business consulting   Haris Kulkarni, PhD, LP  [address]  [place] [address]  PH: [phone]  [employer]  Older Adolescents YES  On busline   Aubrey Wilkins, [facility]  [address]  [place] [address]  PH: [phone]  [facility]  YES YES YES Busline   Genevieve Coates M.Ed, [facility], [facility]  [address]  [place], [place]   [phone]  [email or web address]   YES YES  Experienced in Trans Veterans    Nusrat Dove MA, LMFT, CST  [address]  [place], [place] [address] [phone]  SIVI  YES YES YES Sex positive therapy    NALDO Pratt MFA, Good, LP   [address]  [place] [address] [phone]  Sexfrdooub   YES YES Sexuality groups  [facility]        Mental Health [facility]   Patients: Children, Teen, Adult, Families, Couples  [address]  [place] [address] [phone] fax  Hospitalists [facility]  Patients: Children, adolescents, adults  Locations:  [address] [facility] [address]  [place] [address] [address]  [place] [address] -[address]  [place] [address] [facility] [address]  [place] [address]    Appointment Scheduling   [phone]  [email or web address]  [facility]  All ages- Also offers in-home therapy and sliding fee  [address]  [place] [address]   Phone: [phone]  [facility]    [facility] [place], [place] [address]  Intake line [phone]  [email or web address]/counseling/transgender-mental-health/    Transgender Mental Health Team  Therapists Children Adolescents Adults Family Other information   Christine([facility]) Edvin Joyce, MSW,LICSW YES YES YES  Nepalese and English  [facility] Location- [facility]   Sharmila Stahl MA, LMFT YES YES YES  [place] Location   Ly Valdivia MSW, LICSW   YES YES EMDR  [place] Location     Andrea Antunez MA, LP YES YES YES YES [facility] Location- [facility] Sexual medicine-Psychology  [email or web address]  [place] Location   [address] [place] [address]  Phone [phone]  Therapists Children Adolescents Adults Family Other   Joan Grande PsyD, LP   YES YES-couples    Dony Kinney PsyD, LP   YES     Joseline Wiseman, PhD, LP   YES       [place] Location  [address] [place] [address]  Phone: [phone]  Therapists Children Adolescents Adults Family Other   Nikki Mcdonald PsyD, LP   YES YES    Yara Stephenson PsyD, LP   YES       [facility]  Patients-Queer and Trans Youth and Family counseling serving ages 12-26  [address] [place] [address]  Phone: [phone]  http://Reclaim.[facility]  [facility]  [address]  [place] [address]  Main line: [phone]  [email or web address] Technologiespsych.AskYou    [facility] for Sexual Health/ Program in Human Sexuality  [address]  [place] [address]  PH: [phone]   [email or web address].[facility].edu/clinic-center-sexual-health/transgender-health-services    Therapists Children Adolescents Adults Family Other information   Jennifer Acuña PsyD YES YES YES     Nidhi Gonzales, PhD YES YES YES      Yusra Morales PsyD  YES YES     Mirela Marin, PhD YES YES YES     Ryu Hightower, PhD, MPH YES YES   Sexual and Gender minority health   Multiple other Post-Doctoral fellows practicing at this location as well           [facility] Location: [address] [place] [address]  [place] Location: [address] [address]  Toll Free: [phone]  Phone: [phone]   [email or web address]://[email or web address]/therapeutic-services      Chemical Health Resources    [facility]  [address] [place] [address]  Phone: [phone] or toll free, [phone]  [email or web address]    [facility] LGBT Residential CD Treatment Program  [address] [place] [address]  [phone]  http://www.[facility]/programs/residential/nhovwpkjm-hcms-ssbfxkctxlr-[facility]-[place]/    Additional Provider Directories  [facility]- Provider Directory for all services  http://www.[facility].org/resources-for-you/provider-directory/    [place]'s lesbian alba bisexual transgender & allied mental health providers' network  http://www.lgbttherapists.org/    Provider Directory for Health Related resources in [place]  http://mnlgbtqdirectory.org    Directory for kink, Polyamory, gender non-conforming aware Providers  http://www.kinkaware.org

## 2019-03-20 NOTE — PROGRESS NOTES
Preceptor Attestation:   Patient seen and discussed with the resident. Assessment and plan reviewed with resident and agreed upon.   Supervising Physician:  [facility]

## 2019-03-20 NOTE — PROGRESS NOTES
"          LENARD Gonzalez is a 29 year old individual that uses pronouns She/Her/Hers/Herself that presents today for follow up of:  feminizing hormone therapy. Gender identity: Female    Any special concerns today?  Letter of support for surgery    On hormones?  YES +++       Due for labs?  No      +++ Refills of meds needed?  No---    Past Surgical History:   Procedure Laterality Date     NO HISTORY OF SURGERY         Patient Active Problem List   Diagnosis     Gender dysphoria in adolescent and adult     Mild intermittent asthma     Alopecia     Circadian dysregulation       Current Outpatient Medications   Medication Sig Dispense Refill     B-D SYRINGE LUER-SHWETA 1 ML MISC        BD HYPODERMIC NEEDLE 18G X 1\" MISC        BD HYPODERMIC NEEDLE 25G X 1-1/2\" MISC        estradiol valerate (DELESTROGEN) 20 MG/ML injection Inject 0.5 mLs (10 mg) into the muscle once a week 5 mL 1     finasteride (PROPECIA) 1 MG tablet Take 1 tablet (1 mg) by mouth daily 90 tablet 1     finasteride (PROPECIA) 1 MG tablet Take 1 tablet (1 mg) by mouth daily 90 tablet 1     melatonin 3 MG tablet Take 1 tablet (3 mg) by mouth nightly as needed for sleep       spironolactone (ALDACTONE) 100 MG tablet Take 2 tablets (200 mg) by mouth daily 60 tablet 5       History   Smoking Status     Never Smoker   Smokeless Tobacco     Never Used        No Known Allergies    Problem, Medication and Allergy Lists were reviewed and updated if needed..         Review of Systems:      General    Fat redistribution: YES    Weight change: No HEENT    Voice change: no     Cardiovascular (CV)    Chest Pains: no    Shortness of breath: no Chest    Decreased exercise tolerance:  no    Breast changes/development: YES     Gastrointestinal (GI)    Abdominal pain: no    Change in appetite: no Skin    Acne or oily skin: no    Change in hair: no     Genitourinary ()    Abnormal vaginal bleeding: not applicable     Decreased spontaneous erections: YES    Change in " "libido: no    New sexual partners: no Musculoskeletal    Leg pain or swelling: no     Psychiatric (Psych)    Depression: no    Anxiety/Panic: no    Mood:  \"okay\"                    Physical Exam:   There were no vitals filed for this visit.  BMI= There is no height or weight on file to calculate BMI.   Wt Readings from Last 10 Encounters:   10/23/18 60 kg (132 lb 3.2 oz)   07/27/18 59.7 kg (131 lb 9.6 oz)   07/06/18 58.5 kg (129 lb)   06/12/18 58.2 kg (128 lb 6.4 oz)   04/10/18 59 kg (130 lb)   01/02/18 60.2 kg (132 lb 12.8 oz)   12/11/17 57.9 kg (127 lb 9.6 oz)   11/14/17 59 kg (130 lb)   10/18/17 59 kg (130 lb)   09/14/17 58.5 kg (129 lb)     Appearance: Both appearance and dress    GENERAL:: healthy, alert and no distress  EYES: Eyes grossly normal to inspection, fundi benign and PERRL  HENT: ear canals normal, TM's normal, Nose normal, Mouth- no ulcers, no lesions  RESP: lungs clear to auscultation - no rales, no rhonchi, no wheezes  CV: regular rates and rhythm, normal S1 S2, no S3 or S4 and no murmur, no click or rub -  SKIN: no suspicious lesions, no rashes  Psych: Alert and oriented times 3; coherent speech, normal rate and volume, able to articulate logical thoughts, able to abstract reason, no tangential thoughts, no hallucinations or delusions. Affect is Normal.  LYMPHATICS: no cervical adenopathy  Affect: Appropriate/mood-congruent           Labs:       Assessment and Plan     Gender dysphoria in adolescent and adult  Letter of support for surgery provide for patient.  Discussed no letter should be needed for name change but one would be needed for Gender ID markers to be changed.  This can be provided to patient when she needs it.    Contraception:   not needed  .   Counselled patient about controlled substances: No, no issues    Follow up:  Follow up in 6 months or after surgery.  Results by mychart  Questions were elicited and answered.     Won Ledesma MD    "

## 2019-03-20 NOTE — PATIENT INSTRUCTIONS
[email or web address]    1.  Call back the place where you had the initial visit  2. Check with Juan about insurance requirements  3. Other possible resources are below:     Individual Mental Health Practitioners   Therapist Children Adolescents Adults Family Other   Tawanda Laurie, [facility], DCSW  [address]  [facility] [address]  Work Phone: [phone]   YES YES    Shannon Diop PsyD,   [address]  [place]  [address]  PH: [phone]   YES YES    Diana Ledezma PsyD, [facility]  [facility]  [address]  [place], [place]   PH:[phone] YES YES YES YES Business consulting   Haris Kulkarni, PhD,   [address]  [place] [address]  PH: [phone]  [employer]  Older Adolescents YES  [facility]   Aubrey Wilkins [facility]  [address]  [place] [address]  PH: [phone]  Brain Sentry  YES YES YES Busline   Genevieve Coates M.Ed, [facility], [facility]  [address]  [place], [place]   [phone]  [email or web address]   YES YES  Experienced in Trans Veterans    Nusrat Dove MA, LMFT, CST  [address]  [place], [place] [address] [phone]  Y[employer]Brck[employer]unchbutton  YES YES YES Sex positive therapy    NALDO Pratt MFA, Good, LP   [address]  [address]  [place] [address] [phone]  Sexfromthecenter.Hachimenroppi   YES YES Sexuality groups  [facility]        Mental Health Clinics   [facility]   Patients: Children, Teen, Adult, Families, Couples  [address]  [place] [address] [phone] fax  [place] HaddamCarbonCure TechnologiesVeterans Affairs Medical Center San DiegoLeWa Tek.Hachimenroppi    [facility]  Patients: Children, adolescents, adults  Locations:  [address]  [place] [address]  [address]  [place] [address] -[address]  [place] [address] -[address]  [place] [address]    Appointment Scheduling    [phone]  [email or web address]  [facility]  All ages- Also offers in-home therapy and sliding fee  [address]  [place] [address]   Phone: [phone]  Given.toVA [facility]-DSMPayAllies    [facility] [place] [address]  Intake line [phone]  [email or web address]://www.[facility].org/programsservices/counseling/transgender-mental-health/    Transgender Mental Health Team  Therapists Children Adolescents Adults Family Other information   Christine(Kempton) Edvin Joyce, MSW,LICSW YES YES YES  Pakistani and English  [facility] Location- [facility]   Sharmila Stahl MA, LMFT YES YES YES  [place] Location   Ly Traylor-MS TerranceW, LICSW   YES YES EMDR  [place] Location     Andrea Antunez MA, LP YES YES YES YES [facility] Location- [facility] Sexual medicine-Psychology  [email or web address]  [place] Location   [address] [place] [address]  Phone [phone]  Therapists Children Adolescents Adults Family Other   Joan Grande PsyD, LP   YES YES-couples    Dony Kinney PsyD, LP   YES     Joseline Wiseman, PhD, LP   YES       [place] Location  [address] [place] [address]  Phone: [phone]  Therapists Children Adolescents Adults Family Other   Nikki Mcdonald PsyD, LP   YES YES    Yara Stephenson PsyD, LP   YES       [facility]  Patients-Queer and Trans Youth and Family counseling serving ages 12-26  [address] [place] [address]  Phone: [phone]  http://Reclaim.[facility]  [facility]  [address]  [place] [address]  Main line: [phone]  [email or web address] Star Quarterback    [facility] for Sexual Health/ Program in Human Sexuality  [address] [place] [address], [address]  [place] [address]  PH: [phone]    https://www.sexualhealth.[facility]/clinic-center-sexual-health/transgender-health-services    Therapists Children Adolescents Adults Family Other information   Jennifer Acuña PsyD YES YES YES     Nidhi Gonzales, PhD YES YES YES     Yusra Morales PsyD  YES YES     Mirela Marin, PhD YES YES YES     Ruy Hightower, PhD, MPH YES YES   Sexual and Gender minority health   Multiple other Post-Doctoral fellows practicing at this location as well               Additional Provider Directories  [facility]- Provider Directory for all services  http://www.[facility].org/resources-for-you/provider-directory/    [place]'s lesbian alba bisexual transgender & allied mental health providers' network  http://www.lgbttherapists.org/    Provider Directory for Health Related resources in [place]  http://mnlgbtqdirectory.org    Directory for kink, Polyamory, gender non-conforming aware Providers  http://www.sofyaaware.org

## 2019-03-21 ASSESSMENT — ASTHMA QUESTIONNAIRES: ACT_TOTALSCORE: 25

## 2019-03-27 ENCOUNTER — MEDICAL CORRESPONDENCE (OUTPATIENT)
Dept: HEALTH INFORMATION MANAGEMENT | Facility: CLINIC | Age: 29
End: 2019-03-27

## 2019-03-27 ENCOUNTER — TRANSFERRED RECORDS (OUTPATIENT)
Dept: HEALTH INFORMATION MANAGEMENT | Facility: CLINIC | Age: 29
End: 2019-03-27

## 2019-04-12 DIAGNOSIS — F64.9 GENDER DYSPHORIA: ICD-10-CM

## 2019-04-12 NOTE — TELEPHONE ENCOUNTER

## 2019-04-16 RX ORDER — ESTRADIOL VALERATE 20 MG/ML
10 INJECTION INTRAMUSCULAR WEEKLY
Qty: 5 ML | Refills: 1 | Status: SHIPPED | OUTPATIENT
Start: 2019-04-16 | End: 2019-07-19

## 2019-04-23 ENCOUNTER — OFFICE VISIT (OUTPATIENT)
Dept: FAMILY MEDICINE | Facility: CLINIC | Age: 29
End: 2019-04-23
Payer: COMMERCIAL

## 2019-04-23 VITALS
BODY MASS INDEX: 17.15 KG/M2 | WEIGHT: 130 LBS | SYSTOLIC BLOOD PRESSURE: 113 MMHG | TEMPERATURE: 98.1 F | DIASTOLIC BLOOD PRESSURE: 75 MMHG | OXYGEN SATURATION: 96 % | HEART RATE: 86 BPM

## 2019-04-23 DIAGNOSIS — F64.0 GENDER DYSPHORIA IN ADOLESCENT AND ADULT: Primary | ICD-10-CM

## 2019-04-23 ASSESSMENT — ENCOUNTER SYMPTOMS
DYSURIA: 0
ARTHRALGIAS: 0
SHORTNESS OF BREATH: 0
ABDOMINAL PAIN: 0
LIGHT-HEADEDNESS: 0
CHILLS: 0
NAUSEA: 0
HEADACHES: 0
COUGH: 0
SORE THROAT: 0
MYALGIAS: 0
FEVER: 0
CONSTIPATION: 0
RHINORRHEA: 0
VOMITING: 0
DIARRHEA: 0

## 2019-04-23 NOTE — LETTER
[facility]   [address]  [address]  [facility] [address]  [phone]         To Whom it May Concern:       2019  Regarding:  Carlos Yu  : 1990  Pt goes by: Kelsi  MRN: [id number]    Dear Evaluator,    I am writing on behalf of my patient, Carlos Yu, who uses Kelsi.  I understand Kelsi is requesting a variance so that her documents may reflect Kelsi's actual gender. I am writing to provide a summary of Kelsi s medical care to provide further support for her gender being listed as Female.  Kelsi was male vvz-bajbaiqx-fu-birth and has transitioned gender to female under my medical care for the last 2 years.     I am a [place]-licensed family physician, license number [id number] practicing at [facility] in [place]. I am experienced in evaluation and treatment of patients with gender dysphoria and/or nonbinary gender identities.    Kelsi and I began a doctor-patient relationship in . I have reviewed and evaluated Kelsi's medical history.    Carlos Yu has had the appropriate clinical treatment for transition to the gender female    Documents, like a  s license, passport or birth certificate that accurately identify Kelsi, are essential.  I recommend these reflect Kelsi's accurate and true gender identity.      I declare under penalty of perjury under the laws of the [place] that the forgoing is true and correct.      Sincerely,          Won Ledesma    [facility]

## 2019-04-23 NOTE — PROGRESS NOTES
Preceptor Attestation:   Patient seen, evaluated and discussed with the resident. I have verified the content of the note, which accurately reflects my assessment of the patient and the plan of care.   Supervising Physician:  Geraldine Lezama MD

## 2019-04-23 NOTE — PROGRESS NOTES
HPI       Carlos Yu is a 29 year old  who presents for   Chief Complaint   Patient presents with     RECHECK     HRT       Kelsi is here today for a letter of support for changing gender ID markers and to update/revise a letter in support of orchiectomy.  The letter previously provided for the surgery support letter had 2 typos regarding pronouns.  She is also planning on changing her name officially which she does not require a letter for.             +++++++      Problem, Medication and Allergy Lists were reviewed and updated if needed..    Patient is an established patient of this clinic..         Review of Systems:   Review of Systems   Constitutional: Negative for chills and fever.   HENT: Negative for rhinorrhea and sore throat.    Respiratory: Negative for cough and shortness of breath.    Cardiovascular: Negative for chest pain.   Gastrointestinal: Negative for abdominal pain, constipation, diarrhea, nausea and vomiting.   Genitourinary: Negative for dysuria.   Musculoskeletal: Negative for arthralgias and myalgias.   Neurological: Negative for light-headedness and headaches.            Physical Exam:     Vitals:    04/23/19 0829   BP: 113/75   Pulse: 86   Temp: 98.1  F (36.7  C)   TempSrc: Oral   SpO2: 96%   Weight: 59 kg (130 lb)     Body mass index is 17.15 kg/m .  Vitals were reviewed and were normal     Physical Exam   Constitutional: He is oriented to person, place, and time. He appears well-developed and well-nourished. No distress.   HENT:   Head: Normocephalic and atraumatic.   Eyes: Conjunctivae are normal.   Neck: Neck supple.   Cardiovascular: Normal rate.   Pulmonary/Chest: Effort normal. No respiratory distress.   Musculoskeletal: He exhibits no deformity.   Neurological: He is alert and oriented to person, place, and time.   Skin: Skin is warm and dry.   Psychiatric: He has a normal mood and affect.         Results:       Assessment and Plan        1. Gender dysphoria in  adolescent and adult  Letters provided for ID marker gender change and new letter for support for surgery.         There are no discontinued medications.    Options for treatment and follow-up care were reviewed with the patient. Carlos Yu  engaged in the decision making process and verbalized understanding of the options discussed and agreed with the final plan.    Won Ledesma MD

## 2019-04-23 NOTE — LETTER
[facility]  [facility]. [address] [place] [address],  [address]  [facility] [address]  [phone]       3/20/2019      To Whom It May Concern  Re: Carlos Yu  : 1990     I am writing in support of my patient, Carlos Yu, who will be undergoing gender affirmation surgery with Surgeon Dr. Tidwell and/or his colleagues, for the condition of gender dysphoria in adolescents or adults. The proposed procedure is: Bilateral orchiectomy.    Carlos is under my care and I have reviewed her medical history in detail. The patient has undergone therapeutic evaluation with me since 2017. Carlos began feminizing hormone therapy with estrogen  in 2017, and has been on this medication continuously since, with excellent clinical response.     Her initial and evolving gender, sexual, and other psychiatric diagnoses are included here: gender dysphoria in adolescents or adultscontrolled.    The patient has followed the WPATH Standards of Care to a high degree and is likely to continue to comply. Clinical rationale for surgery is gender affirmation, to better align the external body with the internal gender.    Carlos has met all the eligibility criteria for the proposed surgery includin. Legal age of majority in the patient s nation.  2. If appropriate, 12 months of continuous hormonal therapy for those without a medical contraindication.  3. Living continuously in a gender role congruent with gender identity for greater than 12 months. WPATH standards do not demand this criteria, but this has been the community standard to assess resolve to fulfill an unambiguous transition.  4. If required by a mental health professional, regular responsible participation in psychotherapy, at a frequency determined jointly by the patient and the mental health professional. Psychotherapy per se is not an absolute eligibility criterion for surgery.  5. Availability of a mental  health professional; I am also available to the patient and happy to accept a phone call or electronic health record message to establish the veracity of this relationship.   6. Discussion of the risks, benefits and alternatives to this surgical intervention. This discussion should include the insurance situation, especially as is related to unforseen complications that may require travel. This means: the patient has demonstrable knowledge of the cost, required lengths of hospitalizations, likely complications, and post surgical rehabilitation requirements of various surgical approaches, and awareness of different competent surgeons. There are stable economic, logistical and emotional supports that will be in place during the perioperative period.  The patient lives in [place] with her brother which is close proximity to hospital and surgeon who will be performing surgery.  The patient will be able to arrange for transportation to and from required follow up appointments. The patient will follow up with both the surgeon and myself after surgery to assess her progress.     These criteria are consistent with the World Professional Association for Transgender Health s Standards of Care for the Health of Transsexual, Transgender and Gender Nonconforming People, and support this procedure as medically necessary for Benjamins overall health.      If you have any questions, please contact me or one of our care coordinators in clinic.           Respectfully,            Won Ledesma MD

## 2019-04-24 ENCOUNTER — PRE VISIT (OUTPATIENT)
Dept: UROLOGY | Facility: CLINIC | Age: 29
End: 2019-04-24

## 2019-04-24 ENCOUNTER — OFFICE VISIT (OUTPATIENT)
Dept: UROLOGY | Facility: CLINIC | Age: 29
End: 2019-04-24
Payer: COMMERCIAL

## 2019-04-24 VITALS
BODY MASS INDEX: 16.57 KG/M2 | DIASTOLIC BLOOD PRESSURE: 74 MMHG | HEIGHT: 73 IN | SYSTOLIC BLOOD PRESSURE: 112 MMHG | HEART RATE: 85 BPM | WEIGHT: 125 LBS

## 2019-04-24 DIAGNOSIS — F64.9 GENDER DYSPHORIA: Primary | ICD-10-CM

## 2019-04-24 ASSESSMENT — PAIN SCALES - GENERAL: PAINLEVEL: NO PAIN (0)

## 2019-04-24 ASSESSMENT — MIFFLIN-ST. JEOR: SCORE: 1590.88

## 2019-04-24 NOTE — PATIENT INSTRUCTIONS
Please get your second letter, and come back to see us!    It was a pleasure meeting with you today.  Thank you for allowing me and my team the privilege of caring for you today.  YOU are the reason we are here, and I truly hope we provided you with the excellent service you deserve.  Please let us know if there is anything else we can do for you so that we can be sure you are leaving completely satisfied with your care experience.      Jeff Villalobos,EMT

## 2019-04-24 NOTE — NURSING NOTE
"Return- Orchiectomy F/U  Pt would like to discuss the next steps toward her procedure and options.   She states she has one of the letters in hand and the other one is written but does not have it today.      Chief Complaint   Patient presents with     RECHECK     Orchiectomy F/U       Blood pressure 112/74, pulse 85, height 1.854 m (6' 1\"), weight 56.7 kg (125 lb). Body mass index is 16.49 kg/m .    Patient Active Problem List   Diagnosis     Gender dysphoria in adolescent and adult     Mild intermittent asthma     Alopecia     Circadian dysregulation       No Known Allergies    Current Outpatient Medications   Medication Sig Dispense Refill     B-D SYRINGE LUER-SHWETA 1 ML MISC        BD HYPODERMIC NEEDLE 18G X 1\" MISC        BD HYPODERMIC NEEDLE 25G X 1-1/2\" MISC        estradiol valerate (DELESTROGEN) 20 MG/ML injection Inject 0.5 mLs (10 mg) into the muscle once a week 5 mL 1     finasteride (PROPECIA) 1 MG tablet Take 1 tablet (1 mg) by mouth daily 90 tablet 1     finasteride (PROPECIA) 1 MG tablet Take 1 tablet (1 mg) by mouth daily 90 tablet 1     melatonin 3 MG tablet Take 1 tablet (3 mg) by mouth nightly as needed for sleep       spironolactone (ALDACTONE) 100 MG tablet Take 2 tablets (200 mg) by mouth daily 60 tablet 5       Social History     Tobacco Use     Smoking status: Never Smoker     Smokeless tobacco: Never Used   Substance Use Topics     Alcohol use: No     Drug use: No       Jeff Villalobos, EMT  4/24/2019  3:01 PM      "

## 2019-04-24 NOTE — TELEPHONE ENCOUNTER
Chief Complaint : Return-9 Mo    New Hx/Sx: Gender Dysphoria/Surgery Consult     Records/Orders/Proced: Available    Pt Contacted: N/A    At Rooming: Normal

## 2019-04-24 NOTE — LETTER
RE: Carlos Yu  [address]  [facility] [address]     Dear Colleague,    Thank you for referring your patient, Carlos Yu, to the [facility] AND [facility] FOR PROSTATE AND UROLOGIC CANCERS at [facility]. Please see a copy of my visit note below.    Urology Followup Note    Kelsi Yu is a 29 year old transgender female.  I first met Kelsi in July 2018. At that time, she had been on hormones since Sept 2017  She was living a portion of her time as a male - mostly at work.  Since that time, she has been fully living as a female throughout entire day.  She has remained on hormones.  We have one letter of support in hand (letters tab in Epic - Won Ledesma MD), and the other has been written, but she says she has to pay her medical bill then will receive it.   She remains interested in orchiectomy first then vaginoplasty at a later date.  She has not done hair removal yet.    Exam:  NAD  Ambulates easily  Conversant  Abdomen soft  Breathing comfortably.    A/P   29 year old transgender female.    She meets all criteria except that we only have one letter.  Once we get the second letter, we can proceed with orchiectomy. I discussed sperm banking, permanent nature of procedure, and ability to often stop adjuvant hormone meds - spirono/finasteride.  I also briefly discussed vaginoplasty today. This is a long-term goal. She has not met Dr. Tidwell yet. I suggested an appointment with Dr. Tidwell. We reviewed full depth vs. Minimal depth. She would want full depth. We discussed risks of wounds, poor cosmetic result, urethral injury, rectal injury.    Will obtain second letter, then we can schedule orchiectomy.  Refer to Dr. Diann Cutler MD    I spent 15 minutes on the care of Kelsi Yu. Over 50% of my time was spent counseling or coordinating the care of this patient.

## 2019-04-26 NOTE — PROGRESS NOTES
Urology Followup Note    Kelsi Yu is a 29 year old transgender female.  I first met Kelsi in July 2018. At that time, she had been on hormones since Sept 2017  She was living a portion of her time as a male - mostly at work.  Since that time, she has been fully living as a female throughout entire day.  She has remained on hormones.  We have one letter of support in hand (letters tab in Epic - Won Ledesma MD), and the other has been written, but she says she has to pay her medical bill then will receive it.   She remains interested in orchiectomy first then vaginoplasty at a later date.  She has not done hair removal yet.    Exam:  NAD  Ambulates easily  Conversant  Abdomen soft  Breathing comfortably.    A/P   29 year old transgender female.    She meets all criteria except that we only have one letter.  Once we get the second letter, we can proceed with orchiectomy. I discussed sperm banking, permanent nature of procedure, and ability to often stop adjuvant hormone meds - spirono/finasteride.  I also briefly discussed vaginoplasty today. This is a long-term goal. She has not met Dr. Tidwell yet. I suggested an appointment with Dr. Tidwell. We reviewed full depth vs. Minimal depth. She would want full depth. We discussed risks of wounds, poor cosmetic result, urethral injury, rectal injury.    Will obtain second letter, then we can schedule orchiectomy.  Refer to Dr. Diann Cutler MD    I spent 15 minutes on the care of Kelsi Yu. Over 50% of my time was spent counseling or coordinating the care of this patient.

## 2019-05-01 ENCOUNTER — PATIENT OUTREACH (OUTPATIENT)
Dept: PLASTIC SURGERY | Facility: CLINIC | Age: 29
End: 2019-05-01

## 2019-06-10 DIAGNOSIS — F64.0 GENDER DYSPHORIA IN ADOLESCENT AND ADULT: ICD-10-CM

## 2019-06-10 NOTE — TELEPHONE ENCOUNTER
"Request for medication refill: Spironolactone    Providers if patient needs an appointment and you are willing to give a one month supply please refill for one month and  send a letter/MyChart using \".SMILLIMITEDREFILL\" .smillimited and route chart to \"P SMI \" (Giving one month refill in non controlled medications is strongly recommended before denial)    If refill has been denied, meaning absolutely no refills without visit, please complete the smart phrase \".smirxrefuse\" and route it to the \"P SMI MED REFILLS\"  pool to inform the patient and the pharmacy.    Susan Quintana, [facility]        "

## 2019-06-11 RX ORDER — SPIRONOLACTONE 100 MG/1
200 TABLET, FILM COATED ORAL DAILY
Qty: 180 TABLET | Refills: 3 | Status: SHIPPED | OUTPATIENT
Start: 2019-06-11 | End: 2019-07-19

## 2019-07-11 DIAGNOSIS — L65.9 ALOPECIA: ICD-10-CM

## 2019-07-11 NOTE — TELEPHONE ENCOUNTER
"Request for medication refill:finasteride (PROPECIA) 1 MG tablet    Providers if patient needs an appointment and you are willing to give a one month supply please refill for one month and  send a letter/MyChart using \".SMILLIMITEDREFILL\" .smillimited and route chart to \"P [facility] \" (Giving one month refill in non controlled medications is strongly recommended before denial)    If refill has been denied, meaning absolutely no refills without visit, please complete the smart phrase \".smirxrefuse\" and route it to the \"P [facility] MED REFILLS\"  pool to inform the patient and the pharmacy.    Law Na, MA        "

## 2019-07-15 RX ORDER — FINASTERIDE 1 MG/1
1 TABLET, FILM COATED ORAL DAILY
Qty: 90 TABLET | Refills: 1 | Status: SHIPPED | OUTPATIENT
Start: 2019-07-15 | End: 2019-08-26

## 2019-07-16 ENCOUNTER — MYC MEDICAL ADVICE (OUTPATIENT)
Dept: UROLOGY | Facility: CLINIC | Age: 29
End: 2019-07-16

## 2019-07-16 ENCOUNTER — TELEPHONE (OUTPATIENT)
Dept: FAMILY MEDICINE | Facility: CLINIC | Age: 29
End: 2019-07-16

## 2019-07-16 NOTE — TELEPHONE ENCOUNTER
Prior Authorization Retail Medication Request    Medication/Dose: finasteride (PROPECIA) 1 MG tablet  ICD code (if different than what is on RX):  Alopecia [L65.9]     Previously Tried and Failed:  See chart  Rationale:  See chart    Insurance Name:  [employer]  Insurance ID:  [id number]      Pharmacy Information (if different than what is on RX)  [facility]:  [facility]  Phone:  [phone]\

## 2019-07-17 ENCOUNTER — PRE VISIT (OUTPATIENT)
Dept: PLASTIC SURGERY | Facility: CLINIC | Age: 29
End: 2019-07-17

## 2019-07-17 NOTE — TELEPHONE ENCOUNTER
FUTURE VISIT INFORMATION      FUTURE VISIT INFORMATION:    Date: 8/13/19    Time: 1:00pm    Location: [facility]  REFERRAL INFORMATION:    Referring provider:  Lucas Cutler    Referring providers clinic:  [facility]    Reason for visit/diagnosis  New Vaginoplasty Consult    RECORDS REQUESTED FROM:       Clinic name Comments Records Status Imaging Status   [facility] OV/notes 6/20/18-Current EPIC

## 2019-07-19 ENCOUNTER — OFFICE VISIT (OUTPATIENT)
Dept: FAMILY MEDICINE | Facility: CLINIC | Age: 29
End: 2019-07-19
Payer: COMMERCIAL

## 2019-07-19 VITALS
DIASTOLIC BLOOD PRESSURE: 71 MMHG | HEART RATE: 74 BPM | SYSTOLIC BLOOD PRESSURE: 105 MMHG | BODY MASS INDEX: 17.34 KG/M2 | OXYGEN SATURATION: 95 % | WEIGHT: 131.4 LBS

## 2019-07-19 DIAGNOSIS — F64.0 GENDER DYSPHORIA IN ADOLESCENT AND ADULT: ICD-10-CM

## 2019-07-19 DIAGNOSIS — F64.0 GENDER DYSPHORIA IN ADULT: Primary | ICD-10-CM

## 2019-07-19 DIAGNOSIS — F64.9 GENDER DYSPHORIA: ICD-10-CM

## 2019-07-19 LAB
ALBUMIN SERPL-MCNC: 4.4 MG/DL (ref 3.8–5)
ALP SERPL-CCNC: 39.4 U/L (ref 31.7–110.7)
ALT SERPL-CCNC: 9.7 U/L (ref 0–45)
AST SERPL-CCNC: 16.5 U/L (ref 0–55)
BILIRUB SERPL-MCNC: 0.5 MG/DL (ref 0.2–1.3)
BUN SERPL-MCNC: 7.4 MG/DL (ref 7–21)
CALCIUM SERPL-MCNC: 8.8 MG/DL (ref 8.5–10.1)
CHLORIDE SERPLBLD-SCNC: 101.5 MMOL/L (ref 98–110)
CHOLEST SERPL-MCNC: 114 MG/DL (ref 0–200)
CHOLEST/HDLC SERPL: 2.6 {RATIO} (ref 0–5)
CO2 SERPL-SCNC: 25.1 MMOL/L (ref 20–32)
CREAT SERPL-MCNC: 0.6 MG/DL (ref 0.7–1.3)
GFR SERPL CREATININE-BSD FRML MDRD: >90 ML/MIN/1.7 M2
GLUCOSE SERPL-MCNC: 87 MG'DL (ref 70–99)
HDLC SERPL-MCNC: 43.4 MG/DL
HGB BLD-MCNC: 14.1 G/DL (ref 13.3–17.7)
LDLC SERPL CALC-MCNC: 61 MG/DL (ref 0–129)
POTASSIUM SERPL-SCNC: 3.8 MMOL/DL (ref 3.3–4.5)
PROT SERPL-MCNC: 6.4 G/DL (ref 6.8–8.8)
SODIUM SERPL-SCNC: 133.7 MMOL/L (ref 132.6–141.4)
TRIGL SERPL-MCNC: 46.6 MG/DL (ref 0–150)
VLDL CHOLESTEROL: 9.3 MG/DL (ref 7–32)

## 2019-07-19 RX ORDER — ESTRADIOL VALERATE 20 MG/ML
10 INJECTION INTRAMUSCULAR WEEKLY
Qty: 5 ML | Refills: 1 | Status: SHIPPED | OUTPATIENT
Start: 2019-07-19 | End: 2019-12-23

## 2019-07-19 RX ORDER — SPIRONOLACTONE 100 MG/1
100 TABLET, FILM COATED ORAL DAILY
Qty: 180 TABLET | Refills: 3 | Status: SHIPPED | OUTPATIENT
Start: 2019-07-19 | End: 2021-01-29

## 2019-07-19 NOTE — PROGRESS NOTES
Preceptor Attestation:   Patient seen, evaluated and discussed with the resident. I have verified the content of the note, which accurately reflects my assessment of the patient and the plan of care.   Supervising Physician:  Vince Reeder MD

## 2019-07-19 NOTE — PATIENT INSTRUCTIONS
1. Consider extended/slow release melatonin for night time awakenings.   2. Will Mychart you labs  3.  [phone] call this number Tuesday if you have not heard anything from voice therapy.     Some online resources for transgender health    [place] Transgender Health Coalition   Home to [facility] at [address] [place], [phone]. Also has support groups.  Http://www.[place]transhealth.org/   Wednesdays: Support Group 6-7:30pm for all gender diverse people    Support group for couples: Couplesintransition.org    Center of Excellence for Transgender Health  Comprehensive, effective, and affirming healthcare services for trans and gender-diverse communities.  http://www.transhealth.[facility].edu/trans?page=ajith-00-05    [facility]   Community-based non-profit committed to advancing the health & wellness of LGBTQ communities through research, education and advocacy. http://www.[employer].org    Trans Youth Support Network and The Exchange  For <26 who identify as trans/gender non-conforming & want to be a part of an activist organization. Peer support, education and community building. Find us on Facebook!    Reclaim  RECLAIM offers mental and integrative health services for LGBTQ youth and their families.  http://www.reclaim-lgbtyouth.org/    Gender Spectrum, for Trans Youth  Education, training and support to help create a gender sensitive and inclusive environment for all children and teens. http://www.genderspectrum.org/    [place] s FT Resource Guide  Topics of interest to female-to-male trans men & their loved ones.  http://ftSpiced Bitsuide.org/    Also check out your local [employer] resource center!  LGBTQIA Services at [facility]: http://www.[facility]/lgbtqia/  GLBTA Programs office at  of : https://diversity.[facility].edu/glbta/

## 2019-07-19 NOTE — PROGRESS NOTES
"       LENARD Dolan is a 29 year old individual that uses pronouns She/Her/Hers/Herself that presents today for follow up of:  feminizing hormone therapy. Gender identity: female    Any special concerns today?  no current concerns- due for lab work. Approx 1 year after going on to injections   - if T no suppressed at current 50 aldactone, was at 100 prior. 100 did have some trouble with frequent urination.    On hormones?  YES 10 estradiol injection a week. Day of injection Monday   Refills of meds needed?  Yes  ---    Past Surgical History:   Procedure Laterality Date     NO HISTORY OF SURGERY         Patient Active Problem List   Diagnosis     Gender dysphoria in adolescent and adult     Mild intermittent asthma     Alopecia     Circadian dysregulation       Current Outpatient Medications   Medication Sig Dispense Refill     B-D SYRINGE LUER-SHWETA 1 ML MISC        BD HYPODERMIC NEEDLE 18G X 1\" MISC        BD HYPODERMIC NEEDLE 25G X 1-1/2\" MISC        estradiol valerate (DELESTROGEN) 20 MG/ML injection Inject 0.5 mLs (10 mg) into the muscle once a week 5 mL 1     finasteride (PROPECIA) 1 MG tablet Take 1 tablet (1 mg) by mouth daily 90 tablet 1     finasteride (PROPECIA) 1 MG tablet Take 1 tablet (1 mg) by mouth daily 90 tablet 1     melatonin 3 MG tablet Take 1 tablet (3 mg) by mouth nightly as needed for sleep       spironolactone (ALDACTONE) 100 MG tablet Take 1 tablet (100 mg) by mouth daily 180 tablet 3       History   Smoking Status     Never Smoker   Smokeless Tobacco     Never Used        No Known Allergies    Problem, Medication and Allergy Lists were reviewed and are current..         Review of Systems:      General    Fat redistribution: stable     Weight change: no HEENT    Voice change: no     Cardiovascular (CV)    Chest Pains: no    Shortness of breath: no Chest    Decreased exercise tolerance:  no    Breast changes/development: stable. Not happy overall where they are but low expectations. Joe " "3 based on picture review. Unsure if pursuing breast augmentation      Gastrointestinal (GI)    Abdominal pain: no    Change in appetite: no Skin    Acne or oily skin: no    Change in hair: no     Genitourinary ()    Decreased spontaneous erections: not having erections     Change in libido: low, but okay with low     New sexual partners: no  Musculoskeletal    Leg pain or swelling: no     Psychiatric (Psych)    Depression: no    Anxiety/Panic: no    Mood:  \"good\"                    Physical Exam:     Vitals:    07/19/19 0926   BP: 105/71   Pulse: 74   SpO2: 95%   Weight: 59.6 kg (131 lb 6.4 oz)     BMI= Body mass index is 17.34 kg/m .   Wt Readings from Last 10 Encounters:   07/19/19 59.6 kg (131 lb 6.4 oz)   04/24/19 56.7 kg (125 lb)   04/23/19 59 kg (130 lb)   03/20/19 59.9 kg (132 lb)   10/23/18 60 kg (132 lb 3.2 oz)   07/27/18 59.7 kg (131 lb 9.6 oz)   07/06/18 58.5 kg (129 lb)   06/12/18 58.2 kg (128 lb 6.4 oz)   04/10/18 59 kg (130 lb)   01/02/18 60.2 kg (132 lb 12.8 oz)     Appearance: Female appearance and dress    Physical Exam   Constitutional: She appears well-developed and well-nourished.   Feminine appearance   HENT:   Head: Normocephalic and atraumatic.   Eyes: Conjunctivae are normal.   Cardiovascular: Normal rate and regular rhythm.   No murmur heard.  Pulmonary/Chest: Effort normal and breath sounds normal. No respiratory distress.   Musculoskeletal: She exhibits no edema or tenderness.   Skin: Skin is warm. Capillary refill takes less than 2 seconds.   Psychiatric: She has a normal mood and affect.              Labs:    Results from the last 24 hoursNo results found for this or any previous visit (from the past 24 hour(s)).     Assessment and Plan     1. Gender dysphoria in adult  Stable on estrogen 10 x 1 year. Also down to 50 on aldactone (100 before) will see if total T is suppressed. If not can try 100 and make sure to decrease/limit caffeine use. Patient also puruing orchiectomy. Referral " made for voice therapy     - Lipid [place] ([facility])  - Comprehensive Metabolic Panel ([facility])  - Testosterone total  - SPEECH THERAPY REFERRAL - INTERNAL; Future  - Hemoglobin    - spironolactone (ALDACTONE) 100 MG tablet; Take 1 tablet (100 mg) by mouth daily  Dispense: 180 tablet; Refill: 3  - Hemoglobin    - estradiol valerate (DELESTROGEN) 20 MG/ML injection; Inject 0.5 mLs (10 mg) into the muscle once a week  Dispense: 5 mL; Refill: 1  - Hemoglobin    Not sexually active, asexual. No new partners since last STI screening.     Follow up:  Follow up in 6 months or earlier if orchiectomy performed.   [facility]  Questions were elicited and answered.     Serjio Ayala MD

## 2019-07-23 ENCOUNTER — MYC MEDICAL ADVICE (OUTPATIENT)
Dept: UROLOGY | Facility: CLINIC | Age: 29
End: 2019-07-23

## 2019-07-23 LAB — TESTOST SERPL-MCNC: 16 NG/DL (ref 240–950)

## 2019-07-23 NOTE — TELEPHONE ENCOUNTER
Central Prior Authorization Team   Phone: [phone]    P/A demographics have been submitted to insurance, am awaiting question set.

## 2019-07-24 NOTE — TELEPHONE ENCOUNTER
PA Initiation    Medication: finasteride (PROPECIA) 1 MG tablet  Insurance Company: [employer] - Phone [phone] Fax [phone]  Pharmacy Filling the Rx: [id number] - [place], [place] - [address] AT [facility]  Filling Pharmacy Phone: [phone]  Filling Pharmacy Fax:    Start Date: 7/23/2019

## 2019-07-25 NOTE — TELEPHONE ENCOUNTER
Prior Authorization Approval    Authorization Effective Date: 7/10/2019  Authorization Expiration Date: 7/25/2020  Medication: finasteride (PROPECIA) 1 MG tablet - P/A APPROVED  Approved Dose/Quantity: 90  Reference #: [id number]   Insurance Company: [employer] - Phone [phone] Fax [phone]  Expected CoPay:       CoPay Card Available:      Foundation Assistance Needed:    Which Pharmacy is filling the prescription (Not needed for infusion/clinic administered): [id number] - [place], [place] - [address] AT [facility]  Pharmacy Notified: Yes - spoke to pharmacy, they say they don't have this rx on file (only the Testosterone injection and Spironolactone). Can you please re-send to pharmacy? I did confirm I called the correct location that is on the script.  Patient Notified:

## 2019-08-01 ENCOUNTER — PATIENT OUTREACH (OUTPATIENT)
Dept: UROLOGY | Facility: CLINIC | Age: 29
End: 2019-08-01

## 2019-08-01 ENCOUNTER — TELEPHONE (OUTPATIENT)
Dept: UROLOGY | Facility: CLINIC | Age: 29
End: 2019-08-01

## 2019-08-01 DIAGNOSIS — F64.9 GENDER DYSPHORIA: Primary | ICD-10-CM

## 2019-08-01 RX ORDER — CEFAZOLIN SODIUM 2 G/50ML
2 SOLUTION INTRAVENOUS
Status: CANCELLED | OUTPATIENT
Start: 2019-08-01

## 2019-08-01 RX ORDER — CEFAZOLIN SODIUM 1 G/50ML
1 INJECTION, SOLUTION INTRAVENOUS SEE ADMIN INSTRUCTIONS
Status: CANCELLED | OUTPATIENT
Start: 2019-08-01

## 2019-08-01 NOTE — TELEPHONE ENCOUNTER
Patient is scheduled for surgery with Dr. Santos      Spoke or left message with: left voice mail for patient to call back at [phone]    Date of Surgery: 9/17/19    Location: [facility] OR    Informed patient they will need an adult  yes    Pre-op with surgeon (if applicable): n/a    H&P: Scheduled with pcp    Additional imaging/appointments: n/a    Surgery packet: mailed 8/2/19     Additional comments: n/a

## 2019-08-01 NOTE — PROGRESS NOTES
Left message to have patient call to discuss surgery process.  The PA was sent since both letters are here in the chart.  Wanted to review everything since we have orders too.    Angle Jacobs, RN   Care Coordinator Urology

## 2019-08-06 ENCOUNTER — PATIENT OUTREACH (OUTPATIENT)
Dept: UROLOGY | Facility: CLINIC | Age: 29
End: 2019-08-06

## 2019-08-06 ENCOUNTER — TELEPHONE (OUTPATIENT)
Dept: UROLOGY | Facility: CLINIC | Age: 29
End: 2019-08-06

## 2019-08-06 NOTE — TELEPHONE ENCOUNTER
Patient called back to confirm surgery with Dr Santos on 9/17/19 @ [facility] OR.

## 2019-08-06 NOTE — PROGRESS NOTES
Left message to call to discuss surgery detail with the patient   She is having surgery with Dr Santos on 9/17/19 for Bilateral orchiectomy  No pa required.    Angle Jacobs, RN   Care Coordinator Urology

## 2019-08-09 DIAGNOSIS — F64.9 GENDER DYSPHORIA: Primary | ICD-10-CM

## 2019-08-09 NOTE — TELEPHONE ENCOUNTER
"Request for medication refill: BD [place] 18Gx1    Providers if patient needs an appointment and you are willing to give a one month supply please refill for one month and  send a letter/MyChart using \".SMILLIMITEDREFILL\" .smillimited and route chart to \"P SMI \" (Giving one month refill in non controlled medications is strongly recommended before denial)    If refill has been denied, meaning absolutely no refills without visit, please complete the smart phrase \".smirxrefuse\" and route it to the \"P SMI MED REFILLS\"  pool to inform the patient and the pharmacy.    Carolyn Healy CMA        "

## 2019-08-12 DIAGNOSIS — F64.9 GENDER DYSPHORIA: Primary | ICD-10-CM

## 2019-08-12 RX ORDER — NEEDLES, DISPOSABLE 25GX5/8"
NEEDLE, DISPOSABLE MISCELLANEOUS
Qty: 14 EACH | Refills: 3 | Status: SHIPPED | OUTPATIENT
Start: 2019-08-12 | End: 2020-08-05

## 2019-08-12 RX ORDER — NEEDLES, DISPOSABLE 25GX5/8"
NEEDLE, DISPOSABLE MISCELLANEOUS
Qty: 14 EACH | Refills: 3 | Status: SHIPPED | OUTPATIENT
Start: 2019-08-12 | End: 2020-09-11

## 2019-08-12 RX ORDER — SYRINGE, DISPOSABLE, 1 ML
SYRINGE, EMPTY DISPOSABLE MISCELLANEOUS
Qty: 14 EACH | Refills: 3 | Status: SHIPPED | OUTPATIENT
Start: 2019-08-12 | End: 2020-09-11

## 2019-08-12 NOTE — TELEPHONE ENCOUNTER
"Request for medication refill: B-D 1mL Syringes and B-D 25Gx1.5in [place]    Providers if patient needs an appointment and you are willing to give a one month supply please refill for one month and  send a letter/MyChart using \".SMILLIMITEDREFILL\" .smillimited and route chart to \"P SMI \" (Giving one month refill in non controlled medications is strongly recommended before denial)    If refill has been denied, meaning absolutely no refills without visit, please complete the smart phrase \".smirxrefuse\" and route it to the \"P SMI MED REFILLS\"  pool to inform the patient and the pharmacy.    Carolyn Healy CMA        "

## 2019-08-13 ENCOUNTER — OFFICE VISIT (OUTPATIENT)
Dept: PLASTIC SURGERY | Facility: CLINIC | Age: 29
End: 2019-08-13
Payer: COMMERCIAL

## 2019-08-13 ENCOUNTER — PATIENT OUTREACH (OUTPATIENT)
Dept: PLASTIC SURGERY | Facility: CLINIC | Age: 29
End: 2019-08-13

## 2019-08-13 VITALS
SYSTOLIC BLOOD PRESSURE: 120 MMHG | BODY MASS INDEX: 17.48 KG/M2 | DIASTOLIC BLOOD PRESSURE: 70 MMHG | WEIGHT: 131.9 LBS | OXYGEN SATURATION: 96 % | HEART RATE: 90 BPM | HEIGHT: 73 IN

## 2019-08-13 DIAGNOSIS — F64.0 GENDER DYSPHORIA IN ADULT: Primary | ICD-10-CM

## 2019-08-13 ASSESSMENT — MIFFLIN-ST. JEOR: SCORE: 1622.17

## 2019-08-13 ASSESSMENT — PAIN SCALES - GENERAL: PAINLEVEL: NO PAIN (0)

## 2019-08-13 NOTE — LETTER
"8/13/2019       RE: Jessica Yu  [address]  [place] [address]     Dear Colleague,    Thank you for referring your patient, Jessica Yu, to the [facility] at [facility]. Please see a copy of my visit note below.    REFERRING PROVIDER: Lucas Cutler    REASON FOR CONSULTATION: Gender dysphoria, requesting vaginoplasty.    HPI: Patient is a 29-year-old trans-woman who prefers she her pronouns, referred to me by Dr. Lucas Cutler for possible vaginoplasty.  Patient has been considering undergoing vaginoplasty for the past year.  She sits to urinate routinely and has been doing so for the past 2 years.  By undergoing vaginoplasty, she would like to better align her physical body with her chosen gender identity.  She transitioned 2.5 years ago.  Chosen name is Jesscia.  Has been on estradiol and spironolactone for the past 2.5 years.  She has an orchiectomy scheduled with Dr. Santos.  Has not undergone any other transition related surgeries.  Denies any history of urinary abnormalities, sexually transmit prostate issues, colitis, and perianal issues.  Reports that she does not have full orgasms anymore ever since starting hormone therapy.  The most sensitive part of her genitalia is her penis.  She does masturbate.  She prefers to have sex with both women and men.    MEDS:   Prior to Admission medications    Medication Sig Start Date End Date Taking? Authorizing Provider   B-D SYRINGE LUER-SHWETA 1 ML MISC Use 1 weekly for estrogen injections 8/12/19  Yes Vince Reeder MD   BD HYPODERMIC NEEDLE 18G X 1\" MISC Use 1 weekly to draw up injectable medication 8/12/19  Yes Vince Reeder MD   BD HYPODERMIC NEEDLE 25G X 1-1/2\" MISC Use 1 needle weekly for estrogen injections 8/12/19  Yes Vince Reeder MD   estradiol valerate (DELESTROGEN) 20 MG/ML injection Inject 0.5 mLs (10 mg) into the muscle once a week 7/19/19  Yes Vinec Reeder MD " "  finasteride (PROPECIA) 1 MG tablet Take 1 tablet (1 mg) by mouth daily 7/15/19  Yes Vince Reeder MD   finasteride (PROPECIA) 1 MG tablet Take 1 tablet (1 mg) by mouth daily 1/8/19  Yes Vince Reeder MD   melatonin 3 MG tablet Take 1 tablet (3 mg) by mouth nightly as needed for sleep 4/10/18  Yes Christophe Hernandez MD   spironolactone (ALDACTONE) 100 MG tablet Take 1 tablet (100 mg) by mouth daily 7/19/19  Yes Vince Reeder MD       ALLERGIES: No Known Allergies    PMH:   Past Medical History:   Diagnosis Date     Gender dysphoria in adult      Knee pain, chronic, intermittent      Uncomplicated asthma        PSH:   Past Surgical History:   Procedure Laterality Date     NO HISTORY OF SURGERY         SH:   Social History     Tobacco Use     Smoking status: Never Smoker     Smokeless tobacco: Never Used   Substance Use Topics     Alcohol use: No   Works as a .  Does not drink.    FH:   Family History   Problem Relation Age of Onset     Other Cancer Father         Leukemia     Breast Cancer Maternal Grandmother        ROS: Denies chest pain, shortness of breath, diabetes, MI, CVA, DVT, PE, and bleeding disorders.    PHYSICAL EXAMINATION: /70 (BP Location: Left arm, Patient Position: Sitting, Cuff Size: Adult Regular)   Pulse 90   Ht 1.854 m (6' 1\")   Wt 59.8 kg (131 lb 14.4 oz)   SpO2 96%   BMI 17.40 kg/m     General: In no acute distress.  Genital examination was performed in the presence of a chaperone.  This revealed a circumcised penis with a shaft length that measures 5.5 cm at rest.  There are 2 testicles palpable within the scrotum.  There is no palpable inguinal hernia.  Scrotal size is normal.  Genitalia is hirsute.  There is no other rash or lesion.    ASSESSMENT: Gender dysphoria, requesting vaginoplasty.    PLAN: I urged the patient to establish routine care with a local therapist that can assist with cares especially postoperatively.  We have requested that the patient obtain new " letters of support indicating the need for vaginoplasty.  In the meantime, patient is to undergo orchiectomy and we will give a referral to dermatology for consideration for pre-vaginoplasty genitalia hair removal to decrease the risk of intravaginal hair growth, which is impossible and unsafe to remove postoperatively.  With these items completed, in accordance with ATH Standards Of Care guidelines, patient is a potential candidate for penile inversion vaginoplasty with supplemental scrotal skin graft as an inpatient at the [facility] with a postoperative admission to maintain a vaginal stent for 5 to 7 days.  The operation is performed in conjunction with Dr. Lucas Cutler given the need for surgical expertise from 2 separate subspecialties.  I explained the vaginoplasty procedure in detail today, which include neovaginal cavity dissection, grafting of neovaginal cavity, vaginal colpopexy, clitoroplasty, urethroplasty, perineal flap, orchiectomy, penectomy, scrotectomy, and bilateral labiaplasty.  Patient and I discussed the risks involved to include bleeding, infection, injury to surrounding structures, fluid collection, wound dehiscence with prolonged dressing changes, asymmetry, contour deformity, DVT, PE, rectal injury, rectovaginal fistula, possible need for ostomy, clitoral necrosis, sensory loss, voiding issues, urinary stream abnormalities, flap loss, vaginal prolapse, vaginal shrinkage, vaginal stenosis, need for lifelong dilation, granulation tissue, chronic pain, intravaginal hair growth, incompletely feminized external vulva, and need for revision surgery.  Patient accepts these risks and wishes to work towards obtaining surgery.  I also explained to her that with this surgical technique, we are relying on anterior blood supply to heal the surgical site, preventing me from performing anterior labiaplasty.  And therefore she may require a second stage feminizing labiaplasty to  fully feminize the external vulva.  She understood this.  Patient will be instructed to discontinue hormone therapy 4 weeks prior to surgery and resume it 2 weeks postoperatively to control its risk of DVT.  Patient will perform a bowel prep 1 to 2 days prior to surgery.    Total time spent with patient was 60 min of which greater than 50% was in counseling.    Again, thank you for allowing me to participate in the care of your patient.      Sincerely,    Holden Tidwell MD

## 2019-08-13 NOTE — NURSING NOTE
"Chief Complaint   Patient presents with     Consult     New patient consultation, vaginoplasty.        Vitals:    08/13/19 1242   BP: 120/70   BP Location: Left arm   Patient Position: Sitting   Cuff Size: Adult Regular   Pulse: 90   SpO2: 96%   Weight: 131 lb 14.4 oz   Height: 6' 1\"       Body mass index is 17.4 kg/m .    Yoko Regalado LPN                     "

## 2019-08-13 NOTE — PROGRESS NOTES
"REFERRING PROVIDER: Lucas Cutler    REASON FOR CONSULTATION: Gender dysphoria, requesting vaginoplasty.    HPI: Patient is a 29-year-old trans-woman who prefers she her pronouns, referred to me by Dr. Lucas Cutler for possible vaginoplasty.  Patient has been considering undergoing vaginoplasty for the past year.  She sits to urinate routinely and has been doing so for the past 2 years.  By undergoing vaginoplasty, she would like to better align her physical body with her chosen gender identity.  She transitioned 2.5 years ago.  Chosen name is Jessica.  Has been on estradiol and spironolactone for the past 2.5 years.  She has an orchiectomy scheduled with Dr. Santos.  Has not undergone any other transition related surgeries.  Denies any history of urinary abnormalities, sexually transmit prostate issues, colitis, and perianal issues.  Reports that she does not have full orgasms anymore ever since starting hormone therapy.  The most sensitive part of her genitalia is her penis.  She does masturbate.  She prefers to have sex with both women and men.    MEDS:   Prior to Admission medications    Medication Sig Start Date End Date Taking? Authorizing Provider   B-D SYRINGE LUER-SHWETA 1 ML MISC Use 1 weekly for estrogen injections 8/12/19  Yes Vince Reeder MD   BD HYPODERMIC NEEDLE 18G X 1\" MISC Use 1 weekly to draw up injectable medication 8/12/19  Yes Vince Reeder MD   BD HYPODERMIC NEEDLE 25G X 1-1/2\" MISC Use 1 needle weekly for estrogen injections 8/12/19  Yes Vince Reeder MD   estradiol valerate (DELESTROGEN) 20 MG/ML injection Inject 0.5 mLs (10 mg) into the muscle once a week 7/19/19  Yes Vince Reeder MD   finasteride (PROPECIA) 1 MG tablet Take 1 tablet (1 mg) by mouth daily 7/15/19  Yes Vince Reeder MD   finasteride (PROPECIA) 1 MG tablet Take 1 tablet (1 mg) by mouth daily 1/8/19  Yes Vince Reeder MD   melatonin 3 MG tablet Take 1 tablet (3 mg) by mouth nightly as needed for sleep 4/10/18  Yes " "Christophe Hernandez MD   spironolactone (ALDACTONE) 100 MG tablet Take 1 tablet (100 mg) by mouth daily 7/19/19  Yes Vince Reeder MD       ALLERGIES: No Known Allergies    PMH:   Past Medical History:   Diagnosis Date     Gender dysphoria in adult      Knee pain, chronic, intermittent      Uncomplicated asthma        PSH:   Past Surgical History:   Procedure Laterality Date     NO HISTORY OF SURGERY         SH:   Social History     Tobacco Use     Smoking status: Never Smoker     Smokeless tobacco: Never Used   Substance Use Topics     Alcohol use: No   Works as a .  Does not drink.    FH:   Family History   Problem Relation Age of Onset     Other Cancer Father         Leukemia     Breast Cancer Maternal Grandmother        ROS: Denies chest pain, shortness of breath, diabetes, MI, CVA, DVT, PE, and bleeding disorders.    PHYSICAL EXAMINATION: /70 (BP Location: Left arm, Patient Position: Sitting, Cuff Size: Adult Regular)   Pulse 90   Ht 1.854 m (6' 1\")   Wt 59.8 kg (131 lb 14.4 oz)   SpO2 96%   BMI 17.40 kg/m    General: In no acute distress.  Genital examination was performed in the presence of a chaperone.  This revealed a circumcised penis with a shaft length that measures 5.5 cm at rest.  There are 2 testicles palpable within the scrotum.  There is no palpable inguinal hernia.  Scrotal size is normal.  Genitalia is hirsute.  There is no other rash or lesion.    ASSESSMENT: Gender dysphoria, requesting vaginoplasty.    PLAN: I urged the patient to establish routine care with a local therapist that can assist with cares especially postoperatively.  We have requested that the patient obtain new letters of support indicating the need for vaginoplasty.  In the meantime, patient is to undergo orchiectomy and we will give a referral to dermatology for consideration for pre-vaginoplasty genitalia hair removal to decrease the risk of intravaginal hair growth, which is impossible and unsafe to remove " postoperatively.  With these items completed, in accordance with ATH Standards Of Care guidelines, patient is a potential candidate for penile inversion vaginoplasty with supplemental scrotal skin graft as an inpatient at the [facility] with a postoperative admission to maintain a vaginal stent for 5 to 7 days.  The operation is performed in conjunction with Dr. Lucas Cutler given the need for surgical expertise from 2 separate subspecialties.  I explained the vaginoplasty procedure in detail today, which include neovaginal cavity dissection, grafting of neovaginal cavity, vaginal colpopexy, clitoroplasty, urethroplasty, perineal flap, orchiectomy, penectomy, scrotectomy, and bilateral labiaplasty.  Patient and I discussed the risks involved to include bleeding, infection, injury to surrounding structures, fluid collection, wound dehiscence with prolonged dressing changes, asymmetry, contour deformity, DVT, PE, rectal injury, rectovaginal fistula, possible need for ostomy, clitoral necrosis, sensory loss, voiding issues, urinary stream abnormalities, flap loss, vaginal prolapse, vaginal shrinkage, vaginal stenosis, need for lifelong dilation, granulation tissue, chronic pain, intravaginal hair growth, incompletely feminized external vulva, and need for revision surgery.  Patient accepts these risks and wishes to work towards obtaining surgery.  I also explained to her that with this surgical technique, we are relying on anterior blood supply to heal the surgical site, preventing me from performing anterior labiaplasty.  And therefore she may require a second stage feminizing labiaplasty to fully feminize the external vulva.  She understood this.  Patient will be instructed to discontinue hormone therapy 4 weeks prior to surgery and resume it 2 weeks postoperatively to control its risk of DVT.  Patient will perform a bowel prep 1 to 2 days prior to surgery.    Total time spent with patient was  60 min of which greater than 50% was in counseling.

## 2019-08-13 NOTE — PROGRESS NOTES
[facility]:  Care Coordination Note     SITUATION   Patient (Jessica, She/her) is a 29 year old who is receiving support for:  Clinic Care Coordination - Initial (Vaginoplasty consultation)  .    BACKGROUND     Pt attending vaginoplasty consultation with Dr. Tidwell, pt was unaccompanied.     ASSESSMENT     Surgery              [facility] Assessment  Comprehensive Gender Care ([facility]) Enrollment: Enrolled  Patient has a therapist: No(No ongoing therapist, only letter writers)  Letter of support #1: Requested  Letter of support #2: Requested  Surgery being considered: Yes  Vaginoplasty: Yes          PLAN          Nursing Interventions: [facility] program and services discussed with patient. Educational surgical packet provided and reviewed with patient. Process for accessing surgery discussed, including: WPATH standards of care, letters of support, treatment plan action steps, PA insurance process, surgery scheduling, and approximate timeline.     Pt has letter of support in chart for orchiectomy, no letters for vaginoplasty.  Pt will work on obtaining these letters. PT was encouraged to establish with ongoing therapist throughout the surgical process.     Pt reported legal name change, but has not yet changed insurance or other documents. This may be an issue moving forward, TBD.     Surgeon s photography outcomes reviewed with patient. Pt questions answered within scope of practice.     Follow-up plan:  See Dr. Tidwell's note for F/U.  Pt to obtain two letters of support for vaginoplasty.        Juan Alvarez

## 2019-08-19 ENCOUNTER — TELEPHONE (OUTPATIENT)
Dept: UROLOGY | Facility: CLINIC | Age: 29
End: 2019-08-19

## 2019-08-19 NOTE — TELEPHONE ENCOUNTER
KAITLYN for pt to call to schedule vaginoplasty consult with Dr. Cutler in November, referred by Dr. Tidwell

## 2019-08-21 ENCOUNTER — TELEPHONE (OUTPATIENT)
Dept: UROLOGY | Facility: CLINIC | Age: 29
End: 2019-08-21

## 2019-08-21 NOTE — TELEPHONE ENCOUNTER
2nd attempt to contact pt. KAITLYN for pt to call to schedule vaginoplasty consult with Dr. Cutler in November, referred by Dr. Tidwell

## 2019-08-26 ENCOUNTER — OFFICE VISIT (OUTPATIENT)
Dept: FAMILY MEDICINE | Facility: CLINIC | Age: 29
End: 2019-08-26
Payer: COMMERCIAL

## 2019-08-26 VITALS
BODY MASS INDEX: 17.18 KG/M2 | SYSTOLIC BLOOD PRESSURE: 107 MMHG | HEIGHT: 73 IN | TEMPERATURE: 97.9 F | WEIGHT: 129.6 LBS | DIASTOLIC BLOOD PRESSURE: 73 MMHG | HEART RATE: 86 BPM | RESPIRATION RATE: 16 BRPM | OXYGEN SATURATION: 95 %

## 2019-08-26 DIAGNOSIS — Z01.818 PREOP GENERAL PHYSICAL EXAM: Primary | ICD-10-CM

## 2019-08-26 DIAGNOSIS — F64.0 GENDER DYSPHORIA IN ADULT: ICD-10-CM

## 2019-08-26 LAB
BUN SERPL-MCNC: 6.5 MG/DL (ref 7–21)
CALCIUM SERPL-MCNC: 9 MG/DL (ref 8.5–10.1)
CHLORIDE SERPLBLD-SCNC: 97.9 MMOL/L (ref 98–110)
CO2 SERPL-SCNC: 28.3 MMOL/L (ref 20–32)
CREAT SERPL-MCNC: 0.7 MG/DL (ref 0.7–1.3)
GFR SERPL CREATININE-BSD FRML MDRD: >90 ML/MIN/1.7 M2
GLUCOSE SERPL-MCNC: 89.9 MG'DL (ref 70–99)
POTASSIUM SERPL-SCNC: 4 MMOL/DL (ref 3.3–4.5)
SODIUM SERPL-SCNC: 136.3 MMOL/L (ref 132.6–141.4)

## 2019-08-26 ASSESSMENT — MIFFLIN-ST. JEOR: SCORE: 1611.74

## 2019-08-26 NOTE — PROGRESS NOTES
Preceptor Attestation:   Patient seen, evaluated and discussed with the resident.   I have verified the content of the note, which accurately reflects my assessment of the patient and the plan of care.   Supervising Physician:  Giuliano Griffin MD

## 2019-08-26 NOTE — PROGRESS NOTES
[facility]  [address] [place] [address],  [address]  [facility] [address]  Phone: [phone]  Fax: [phone]    8/26/2019    Adult PRE-OP Evaluation:    Jessica Yu, 1990 presents for pre-operative evaluation and assessment as requested by Dr. Santos, prior to undergoing surgery/procedure for treatment of Bilateral Scrotal Orchiectomy .    Proposed procedure: Bilateral Scrotal Orchiectomy    Date of Surgery/ Procedure: 9/17/19  Hospital/Surgical Facility:    [facility]      Fax: [phone]      Phone: [phone]     Primary Physician: Serjio Ayala  Type of Anesthesia Anticipated: To be determined  History of anesthesia complications: NONE  History of  abnormal bleeding: NONE   History of blood transfusions: NO  Patient has a Health Care Directive or Living Will:  NO    Preoperative Questions   1. NO - Do you have a history of heart attack, stroke, stent, bypass or surgery on an artery in the head, neck, heart or legs?  2. NO - Do you ever have any pain or discomfort in your chest?  3. NO - Have you ever had a severe pain across the front of your chest lasting for half an hour or more?   4. NO - Do you have a history of Congestive Heart Failure?  5. NO - Are you troubled by shortness of breath when: walking on the level/ up a slight hill/ at night?  6. Yes, pt has asthma but no problems unless around pets - Does your chest ever sound wheezy or whistling?  7. NO - Do you currently have a cold, bronchitis or other respiratory infection?  8. NO - Have you had a cold, bronchitis or other respiratory infection within the last 2 weeks?  9. NO - Do you usually have a cough?  10. NO - Do you sometimes get pains in the calves of your legs when you walk?  11. NO - Do you or anyone in your family have previous history of blood clots?  12. NO - Do you or does anyone in your family have a serious bleeding problem such as prolonged bleeding following surgeries or cuts?  13. NO  - Have  "you ever had problems with anemia or been told to take iron pills?  14. NO - Have you had any abnormal blood loss such as black, tarry or bloody stools, or abnormal vaginal bleeding?  15. NO - Have you ever had a blood transfusion?  16. NO - Have you or any of your relatives ever had problems with anesthesia?  17. NO - Do you have sleep apnea, excessive snoring or daytime drowsiness?  18. NO - Do you have any prosthetic heart valves?  19. NO - Do you have prosthetic joints?  20. NO - Is there any chance that you may be pregnant?    Patient Active Problem List   Diagnosis     Gender dysphoria in adolescent and adult     Mild intermittent asthma     Alopecia     Circadian dysregulation       Current Outpatient Medications on File Prior to Visit:  B-D SYRINGE LUER-SHWETA 1 ML MISC Use 1 weekly for estrogen injections   BD HYPODERMIC NEEDLE 18G X 1\" MISC Use 1 weekly to draw up injectable medication   BD HYPODERMIC NEEDLE 25G X 1-1/2\" MISC Use 1 needle weekly for estrogen injections   estradiol valerate (DELESTROGEN) 20 MG/ML injection Inject 0.5 mLs (10 mg) into the muscle once a week   finasteride (PROPECIA) 1 MG tablet Take 1 tablet (1 mg) by mouth daily   spironolactone (ALDACTONE) 100 MG tablet Take 1 tablet (100 mg) by mouth daily   finasteride (PROPECIA) 1 MG tablet Take 1 tablet (1 mg) by mouth daily (Patient not taking: Reported on 8/26/2019)   melatonin 3 MG tablet Take 1 tablet (3 mg) by mouth nightly as needed for sleep     No current facility-administered medications on file prior to visit.     OTC products: None, except as noted above    No Known Allergies  Latex Allergy: NO    Social History     Socioeconomic History     Marital status: Single     Spouse name: None     Number of children: None     Years of education: None     Highest education level: None   Occupational History     None   Social Needs     Financial resource strain: None     Food insecurity:     Worry: None     Inability: None     " "Transportation needs:     Medical: None     Non-medical: None   Tobacco Use     Smoking status: Never Smoker     Smokeless tobacco: Never Used   Substance and Sexual Activity     Alcohol use: No     Drug use: No     Sexual activity: Never   Lifestyle     Physical activity:     Days per week: None     Minutes per session: None     Stress: None   Relationships     Social connections:     Talks on phone: None     Gets together: None     Attends Christian service: None     Active member of club or organization: None     Attends meetings of clubs or organizations: None     Relationship status: None     Intimate partner violence:     Fear of current or ex partner: None     Emotionally abused: None     Physically abused: None     Forced sexual activity: None   Other Topics Concern     None   Social History Narrative     None       REVIEW OF SYSTEMS:   Constitutional, HEENT, cardiovascular, pulmonary, gi and gu systems are negative, except as otherwise noted.    EXAM:     Patient Vitals for the past 24 hrs:   BP Temp Temp src Pulse Resp SpO2 Height Weight   08/26/19 1316 107/73 97.9  F (36.6  C) Oral 86 16 95 % 1.854 m (6' 1\") 58.8 kg (129 lb 9.6 oz)     Body mass index is 17.1 kg/m .  GENERAL: healthy, alert and no distress  EYES: Eyes grossly normal to inspection, extraocular movements - intact, and PERRL  HENT: ear canals- normal; Nose- normal; Mouth- no ulcers, no lesions  NECK: no tenderness, no adenopathy, no asymmetry, no masses, no stiffness; thyroid- normal to palpation  RESP: lungs clear to auscultation - no rales, no rhonchi, no wheezes  CV: regular rates and rhythm, normal S1 S2, no S3 or S4 and no murmur, no click or rub -  ABDOMEN: soft, no tenderness, no  hepatosplenomegaly, no masses, normal bowel sounds  MS: extremities- no gross deformities noted, no edema  SKIN: no suspicious lesions, no rashes  NEURO: strength and tone- normal, sensory exam- grossly normal, mentation- intact, speech- normal, reflexes- " symmetric  PSYCH: Alert and oriented times 3; speech- coherent , normal rate and volume; able to articulate logical thoughts    DIAGNOSTICS:      No labs or EKG required for low risk surgery (cataract, skin procedure, breast biopsy, etc)    RISK ASSESSMENT:     Cardiovascular Risk:  -Patient is able to participate in strenuous activities without chest pain.  -The patient does not have chest pain with exertion.  -Patient does not have a history of congestive heart failure.    -The patient does not have a history of stroke and does not have a history of valvular disease.    Pulmonary Risk:  -In terms of risk factors for pulmonary complication, the patient has no risk factors    Perioperative Complications:  -The patient does not have a history of bleeding or clotting problems in the past.    -The patient has not had surgery previously.    -The patient does not have a family history of any anesthesia or surgical complications.      IMPRESSION:   Reason for surgery/procedure: Bilateral Orchiectomy for Gender Dysphoria    The proposed surgical procedure is considered INTERMEDIATE risk.    For above listed surgery and anesthesia:   Patient is at low risk for surgery/procedure and perioperative/procedure complications.    RECOMMENDATIONS:     Labs:  Urine Culture per Urology  BMP to check potassium given spironolactone use    Fasting:  NPO for 12 hours prior to surgery    Preop Plan:  --Approval given to proceed with proposed procedure, without further diagnostic evaluation    Medications:  Patient should take their regular medications the morning of surgery unless otherwise instructed.       Dandy Reaves MD    Please contact our office if there are any further questions or information required about this patient.    Preceptor Attestation:   Patient seen, evaluated and discussed with the resident. I have verified the content of the note, which accurately reflects my assessment of the patient and the plan of  care.   Supervising Physician:  Giuliano Griffin MD

## 2019-08-27 LAB
BACTERIA SPEC CULT: NO GROWTH
Lab: NORMAL
SPECIMEN SOURCE: NORMAL

## 2019-09-11 ENCOUNTER — TELEPHONE (OUTPATIENT)
Dept: DERMATOLOGY | Facility: CLINIC | Age: 29
End: 2019-09-11

## 2019-09-16 ENCOUNTER — ANESTHESIA EVENT (OUTPATIENT)
Dept: SURGERY | Facility: AMBULATORY SURGERY CENTER | Age: 29
End: 2019-09-16

## 2019-09-17 ENCOUNTER — HOSPITAL ENCOUNTER (OUTPATIENT)
Facility: AMBULATORY SURGERY CENTER | Age: 29
End: 2019-09-17
Attending: UROLOGY
Payer: COMMERCIAL

## 2019-09-17 ENCOUNTER — ANESTHESIA (OUTPATIENT)
Dept: SURGERY | Facility: AMBULATORY SURGERY CENTER | Age: 29
End: 2019-09-17

## 2019-09-17 VITALS
RESPIRATION RATE: 16 BRPM | WEIGHT: 125 LBS | HEART RATE: 58 BPM | OXYGEN SATURATION: 98 % | SYSTOLIC BLOOD PRESSURE: 108 MMHG | BODY MASS INDEX: 16.57 KG/M2 | DIASTOLIC BLOOD PRESSURE: 78 MMHG | HEIGHT: 73 IN | TEMPERATURE: 97.7 F

## 2019-09-17 DIAGNOSIS — F64.0 GENDER DYSPHORIA IN ADOLESCENT AND ADULT: Primary | ICD-10-CM

## 2019-09-17 RX ORDER — DEXAMETHASONE SODIUM PHOSPHATE 4 MG/ML
INJECTION, SOLUTION INTRA-ARTICULAR; INTRALESIONAL; INTRAMUSCULAR; INTRAVENOUS; SOFT TISSUE PRN
Status: DISCONTINUED | OUTPATIENT
Start: 2019-09-17 | End: 2019-09-17

## 2019-09-17 RX ORDER — LIDOCAINE HYDROCHLORIDE 20 MG/ML
INJECTION, SOLUTION INFILTRATION; PERINEURAL PRN
Status: DISCONTINUED | OUTPATIENT
Start: 2019-09-17 | End: 2019-09-17

## 2019-09-17 RX ORDER — OXYCODONE HYDROCHLORIDE 5 MG/1
5-10 TABLET ORAL EVERY 6 HOURS PRN
Qty: 15 TABLET | Refills: 0 | Status: SHIPPED | OUTPATIENT
Start: 2019-09-17 | End: 2021-01-29

## 2019-09-17 RX ORDER — CEFAZOLIN SODIUM 1 G/50ML
1 SOLUTION INTRAVENOUS SEE ADMIN INSTRUCTIONS
Status: DISCONTINUED | OUTPATIENT
Start: 2019-09-17 | End: 2019-09-17 | Stop reason: HOSPADM

## 2019-09-17 RX ORDER — BUPIVACAINE HYDROCHLORIDE 5 MG/ML
INJECTION, SOLUTION PERINEURAL PRN
Status: DISCONTINUED | OUTPATIENT
Start: 2019-09-17 | End: 2019-09-17 | Stop reason: HOSPADM

## 2019-09-17 RX ORDER — MEPERIDINE HYDROCHLORIDE 25 MG/ML
12.5 INJECTION INTRAMUSCULAR; INTRAVENOUS; SUBCUTANEOUS
Status: DISCONTINUED | OUTPATIENT
Start: 2019-09-17 | End: 2019-09-18 | Stop reason: HOSPADM

## 2019-09-17 RX ORDER — PROPOFOL 10 MG/ML
INJECTION, EMULSION INTRAVENOUS CONTINUOUS PRN
Status: DISCONTINUED | OUTPATIENT
Start: 2019-09-17 | End: 2019-09-17

## 2019-09-17 RX ORDER — FENTANYL CITRATE 50 UG/ML
25-50 INJECTION, SOLUTION INTRAMUSCULAR; INTRAVENOUS EVERY 5 MIN PRN
Status: DISCONTINUED | OUTPATIENT
Start: 2019-09-17 | End: 2019-09-17 | Stop reason: HOSPADM

## 2019-09-17 RX ORDER — KETOROLAC TROMETHAMINE 30 MG/ML
INJECTION, SOLUTION INTRAMUSCULAR; INTRAVENOUS PRN
Status: DISCONTINUED | OUTPATIENT
Start: 2019-09-17 | End: 2019-09-17

## 2019-09-17 RX ORDER — LIDOCAINE 40 MG/G
CREAM TOPICAL
Status: DISCONTINUED | OUTPATIENT
Start: 2019-09-17 | End: 2019-09-17 | Stop reason: HOSPADM

## 2019-09-17 RX ORDER — AMOXICILLIN 250 MG
1-2 CAPSULE ORAL 2 TIMES DAILY
Qty: 30 TABLET | Refills: 0 | Status: SHIPPED | OUTPATIENT
Start: 2019-09-17 | End: 2021-01-29

## 2019-09-17 RX ORDER — SODIUM CHLORIDE, SODIUM LACTATE, POTASSIUM CHLORIDE, CALCIUM CHLORIDE 600; 310; 30; 20 MG/100ML; MG/100ML; MG/100ML; MG/100ML
INJECTION, SOLUTION INTRAVENOUS CONTINUOUS
Status: DISCONTINUED | OUTPATIENT
Start: 2019-09-17 | End: 2019-09-18 | Stop reason: HOSPADM

## 2019-09-17 RX ORDER — SODIUM CHLORIDE, SODIUM LACTATE, POTASSIUM CHLORIDE, CALCIUM CHLORIDE 600; 310; 30; 20 MG/100ML; MG/100ML; MG/100ML; MG/100ML
INJECTION, SOLUTION INTRAVENOUS CONTINUOUS
Status: DISCONTINUED | OUTPATIENT
Start: 2019-09-17 | End: 2019-09-17 | Stop reason: HOSPADM

## 2019-09-17 RX ORDER — ONDANSETRON 2 MG/ML
INJECTION INTRAMUSCULAR; INTRAVENOUS PRN
Status: DISCONTINUED | OUTPATIENT
Start: 2019-09-17 | End: 2019-09-17

## 2019-09-17 RX ORDER — ONDANSETRON 2 MG/ML
4 INJECTION INTRAMUSCULAR; INTRAVENOUS EVERY 30 MIN PRN
Status: DISCONTINUED | OUTPATIENT
Start: 2019-09-17 | End: 2019-09-18 | Stop reason: HOSPADM

## 2019-09-17 RX ORDER — NALOXONE HYDROCHLORIDE 0.4 MG/ML
.1-.4 INJECTION, SOLUTION INTRAMUSCULAR; INTRAVENOUS; SUBCUTANEOUS
Status: DISCONTINUED | OUTPATIENT
Start: 2019-09-17 | End: 2019-09-18 | Stop reason: HOSPADM

## 2019-09-17 RX ORDER — ACETAMINOPHEN 325 MG/1
975 TABLET ORAL ONCE
Status: COMPLETED | OUTPATIENT
Start: 2019-09-17 | End: 2019-09-17

## 2019-09-17 RX ORDER — OXYCODONE HYDROCHLORIDE 5 MG/1
5 TABLET ORAL EVERY 4 HOURS PRN
Status: DISCONTINUED | OUTPATIENT
Start: 2019-09-17 | End: 2019-09-18 | Stop reason: HOSPADM

## 2019-09-17 RX ORDER — ONDANSETRON 4 MG/1
4 TABLET, ORALLY DISINTEGRATING ORAL EVERY 30 MIN PRN
Status: DISCONTINUED | OUTPATIENT
Start: 2019-09-17 | End: 2019-09-18 | Stop reason: HOSPADM

## 2019-09-17 RX ORDER — GABAPENTIN 300 MG/1
300 CAPSULE ORAL ONCE
Status: COMPLETED | OUTPATIENT
Start: 2019-09-17 | End: 2019-09-17

## 2019-09-17 RX ORDER — CEFAZOLIN SODIUM 2 G/50ML
2 SOLUTION INTRAVENOUS
Status: COMPLETED | OUTPATIENT
Start: 2019-09-17 | End: 2019-09-17

## 2019-09-17 RX ORDER — ACETAMINOPHEN 325 MG/1
650 TABLET ORAL EVERY 4 HOURS PRN
Qty: 50 TABLET | Refills: 0 | Status: SHIPPED | OUTPATIENT
Start: 2019-09-17 | End: 2021-01-29

## 2019-09-17 RX ORDER — PROPOFOL 10 MG/ML
INJECTION, EMULSION INTRAVENOUS PRN
Status: DISCONTINUED | OUTPATIENT
Start: 2019-09-17 | End: 2019-09-17

## 2019-09-17 RX ORDER — FENTANYL CITRATE 50 UG/ML
INJECTION, SOLUTION INTRAMUSCULAR; INTRAVENOUS PRN
Status: DISCONTINUED | OUTPATIENT
Start: 2019-09-17 | End: 2019-09-17

## 2019-09-17 RX ADMIN — LIDOCAINE HYDROCHLORIDE 80 MG: 20 INJECTION, SOLUTION INFILTRATION; PERINEURAL at 08:08

## 2019-09-17 RX ADMIN — PROPOFOL 200 MG: 10 INJECTION, EMULSION INTRAVENOUS at 08:08

## 2019-09-17 RX ADMIN — PROPOFOL 150 MCG/KG/MIN: 10 INJECTION, EMULSION INTRAVENOUS at 08:08

## 2019-09-17 RX ADMIN — CEFAZOLIN SODIUM 2 G: 2 SOLUTION INTRAVENOUS at 08:14

## 2019-09-17 RX ADMIN — SODIUM CHLORIDE, SODIUM LACTATE, POTASSIUM CHLORIDE, CALCIUM CHLORIDE: 600; 310; 30; 20 INJECTION, SOLUTION INTRAVENOUS at 06:35

## 2019-09-17 RX ADMIN — GABAPENTIN 300 MG: 300 CAPSULE ORAL at 06:35

## 2019-09-17 RX ADMIN — DEXAMETHASONE SODIUM PHOSPHATE 4 MG: 4 INJECTION, SOLUTION INTRA-ARTICULAR; INTRALESIONAL; INTRAMUSCULAR; INTRAVENOUS; SOFT TISSUE at 08:08

## 2019-09-17 RX ADMIN — ONDANSETRON 4 MG: 2 INJECTION INTRAMUSCULAR; INTRAVENOUS at 08:08

## 2019-09-17 RX ADMIN — ACETAMINOPHEN 975 MG: 325 TABLET ORAL at 06:35

## 2019-09-17 RX ADMIN — FENTANYL CITRATE 50 MCG: 50 INJECTION, SOLUTION INTRAMUSCULAR; INTRAVENOUS at 08:11

## 2019-09-17 RX ADMIN — KETOROLAC TROMETHAMINE 30 MG: 30 INJECTION, SOLUTION INTRAMUSCULAR; INTRAVENOUS at 09:05

## 2019-09-17 ASSESSMENT — MIFFLIN-ST. JEOR: SCORE: 1590.88

## 2019-09-17 NOTE — ANESTHESIA PREPROCEDURE EVALUATION
Anesthesia Pre-Procedure Evaluation    Patient: Jessica Yu   MRN:     [id number] Gender:   adult   Age:    29 year old :      1990        Preoperative Diagnosis: Gender Confirmation   Procedure(s):  Bilateral Scrotal Orchiectomy     Past Medical History:   Diagnosis Date     Gender dysphoria in adult      Knee pain, chronic, intermittent      Uncomplicated asthma       Past Surgical History:   Procedure Laterality Date     NO HISTORY OF SURGERY            Anesthesia Evaluation     .             ROS/MED HX    ENT/Pulmonary:     (+)Intermittent asthma , . .    Neurologic:  - neg neurologic ROS     Cardiovascular:  - neg cardiovascular ROS       METS/Exercise Tolerance:  4 - Raking leaves, gardening   Hematologic:  - neg hematologic  ROS       Musculoskeletal:  - neg musculoskeletal ROS       GI/Hepatic:         Renal/Genitourinary:  - ROS Renal section negative       Endo:  - neg endo ROS       Psychiatric:  - neg psychiatric ROS       Infectious Disease:  - neg infectious disease ROS       Malignancy:         Other:                         PHYSICAL EXAM:   Mental Status/Neuro: A/A/O   Airway: Facies: Feasible  Mallampati: I  Mouth/Opening: Full  TM distance: > 6 cm  Neck ROM: Full   Respiratory: Auscultation: CTAB     Resp. Rate: Normal     Resp. Effort: Normal      CV: Rhythm: Regular  Rate: Age appropriate  Heart: Normal Sounds  Edema: None   Comments:      Dental: Details                  LABS:  CBC:   Lab Results   Component Value Date    HGB 14.1 2019    HGB 15.5 2018     BMP:   Lab Results   Component Value Date    .3 2019    .7 2019    POTASSIUM 4.0 2019    POTASSIUM 3.8 2019    CHLORIDE 97.9 (L) 2019    CHLORIDE 101.5 2019    CO2 28.3 2019    CO2 25.1 2019    BUN 6.5 (L) 2019    BUN 7.4 2019    CR 0.7 2019    CR 0.6 (L) 2019    GLC 89.9 2019    GLC 87.0 2019     COAGS: No results found for:  "PTT, INR, FIBR  POC: No results found for: BGM, HCG, HCGS  OTHER:   Lab Results   Component Value Date    JALEEL 9.0 08/26/2019    ALBUMIN 4.6 07/29/2017    PROTTOTAL 6.4 (L) 07/19/2019    ALT 9.7 07/19/2019    AST 16.5 07/19/2019    ALKPHOS 39.4 07/19/2019    BILITOTAL 0.5 07/19/2019        Preop Vitals    BP Readings from Last 3 Encounters:   09/17/19 110/79   08/26/19 107/73   08/13/19 120/70    Pulse Readings from Last 3 Encounters:   09/17/19 70   08/26/19 86   08/13/19 90      Resp Readings from Last 3 Encounters:   09/17/19 16   08/26/19 16   10/23/18 16    SpO2 Readings from Last 3 Encounters:   09/17/19 97%   08/26/19 95%   08/13/19 96%      Temp Readings from Last 1 Encounters:   09/17/19 36.5  C (97.7  F) (Oral)    Ht Readings from Last 1 Encounters:   09/17/19 1.854 m (6' 1\")      Wt Readings from Last 1 Encounters:   09/17/19 56.7 kg (125 lb)    Estimated body mass index is 16.49 kg/m  as calculated from the following:    Height as of this encounter: 1.854 m (6' 1\").    Weight as of this encounter: 56.7 kg (125 lb).     LDA:  Peripheral IV 09/17/19 Right Hand (Active)   Site Assessment WDL 9/17/2019  6:48 AM   Line Status Infusing 9/17/2019  6:48 AM   Phlebitis Scale 0-->no symptoms 9/17/2019  6:48 AM   Number of days: 0        Assessment:   ASA SCORE: 2    H&P: History and physical reviewed and following examination; no interval change.   Smoking Status:  Non-Smoker/Unknown   NPO Status: NPO Appropriate     Plan:   Anes. Type:  General   Pre-Medication: None   Induction:  IV (Standard)   Airway: LMA   Access/Monitoring: PIV   Maintenance: TIVA     Postop Plan:   Postop Pain: Opioids  Postop Sedation/Airway: Not planned  Disposition: Outpatient     PONV Management:   Adult Risk Factors:, Non-Smoker, Postop Opioids   Prevention: Ondansetron, Dexamethasone, No Volatiles     CONSENT: Direct conversation   Plan and risks discussed with: Patient                      Nabil Sifuentes MD, MD  "

## 2019-09-17 NOTE — ANESTHESIA POSTPROCEDURE EVALUATION
Anesthesia POST Procedure Evaluation    Patient: Jessica Yu   MRN:     [id number] Gender:   adult   Age:    29 year old :      1990        Preoperative Diagnosis: Gender Confirmation   Procedure(s):  Bilateral Scrotal Orchiectomy   Postop Comments: No value filed.       Anesthesia Type:  Not documented  General    Reportable Event: NO     PAIN: Uncomplicated   Sign Out status: Comfortable, Well controlled pain     PONV: No PONV   Sign Out status:  No Nausea or Vomiting     Neuro/Psych: Uneventful perioperative course   Sign Out Status: Preoperative baseline; Age appropriate mentation     Airway/Resp.: Uneventful perioperative course   Sign Out Status: Non labored breathing, age appropriate RR; Resp. Status within EXPECTED Parameters     CV: Uneventful perioperative course   Sign Out status: Appropriate BP and perfusion indices; Appropriate HR/Rhythm     Disposition:   Sign Out in:  PACU  Disposition:  Phase II; Home  Recovery Course: Uneventful  Follow-Up: Not required           Last Anesthesia Record Vitals:  CRNA VITALS  2019 0845 - 2019 0945      2019             Pulse:  63    SpO2:  98 %          Last PACU Vitals:  Vitals Value Taken Time   /80 2019  9:45 AM   Temp 36.4  C (97.6  F) 2019  9:45 AM   Pulse 75 2019  9:45 AM   Resp 46 2019  9:47 AM   SpO2 94 % 2019  9:47 AM   Temp src     NIBP     Pulse     SpO2     Resp     Temp     Ht Rate     Temp 2     Vitals shown include unvalidated device data.      Electronically Signed By: Nabil Sifuentes MD, MD, 2019, 10:46 AM

## 2019-09-17 NOTE — OP NOTE
PREOPERATIVE DIAGNOSIS:            Gender confirmation  POSTOPERATIVE DIAGNOSIS:                      Same    PROCEDURES PERFORMED:   bilateral simple scrotal orchiectomy    STAFF SURGEON:  Xavier Santos MD  RESIDENT(S):  Nabil Lainez MD  ANESTHESIA:  GETA    ESTIMATED BLOOD LOSS: <5 mL.   IV FLUIDS: see dictated anesthesia record  COMPLICATIONS: None.   SPECIMEN:    bilateral testicle and spermatic cord up to the level of the external ring     SIGNIFICANT FINDINGS:   Ligation of the cord at the level approximately of the external ring; testicle excised en bloc.    BRIEF OPERATIVE INDICATIONS: Jessica Yu is a 29 year old transgender female wishing to undergo bilateral orchiectomy for gender confirming surgery. Risks and benefits of the procedure were discussed and patient decided she would like to proceed with the above procedure.      DESCRIPTION OF PROCEDURE: After full informed voluntary consent was obtained, the patient was transported to the operating room, placed supine on the table. After adequate anesthesia was induced, they were prepped and draped in the usual sterile fashion. A timeout was taken to confirm correct patient, procedure and laterality      We began the procedure by marking a 3 cm scrotal incision beginning at the penoscrotal junction at the midline raphe. Injection of approximately 5cc 0.5% Marcaine was used for local anesthesia along this chanel. Incision was made using a scalpel and electrocautery was used to carry dissection down through the dartos muscle. The right testicle was delivered into the wound. Tunica vaginalis was intact. Blunt and electrocautery dissection was continued proximally to mobilize the cord.  The cord was dissected superiorly up to the level of the external ring. It was divided into two segments and separately clamped and divided. Each was then stick tied with 0 silk suture, then a 0 vicryl hand tie was placed below the stick tie. The cord was then divided  sharply. The testicle and spermatic cord were removed.  The procedure as stated above was then repeated on the left side.   Irrigation was performed and a cord block was done with 0.5% Marcaine on remnant of spermatic cord bilaterally. Hemostasis was excellent.      The dartos was closed with a running 3-0 chromic suture followed by the skin with running 4-0 monocryl suture in subcutaneous fashion. Dermabond was applied     Patient tolerated the procedure well. No apparent complications. She was transported to the postanesthesia care unit in stable condition.    Plan:  - Follow up with Dr. Santos on 10/3 as scheduled    Nabil Lainez MD  Urology Resident     I was present and scrubbed for the entire procedure.  Henri Santos MD  Urology Staff

## 2019-09-17 NOTE — ANESTHESIA CARE TRANSFER NOTE
Patient: Jessica Hauser    Procedure(s):  Bilateral Scrotal Orchiectomy    Diagnosis: Gender Confirmation  Diagnosis Additional Information: No value filed.    Anesthesia Type:   General     Note:  Airway :Face Mask and Oral Airway  Patient transferred to:PACU  Comments: VSS/WNL. Resp adeq with OAAW in place.Handoff Report: Identifed the Patient, Identified the Reponsible Provider, Reviewed the pertinent medical history, Discussed the surgical course, Reviewed Intra-OP anesthesia mangement and issues during anesthesia, Set expectations for post-procedure period and Allowed opportunity for questions and acknowledgement of understanding      Vitals: (Last set prior to Anesthesia Care Transfer)    CRNA VITALS  9/17/2019 0845 - 9/17/2019 0919      9/17/2019             Pulse:  63    SpO2:  98 %                Electronically Signed By: YO Dominguez CRNA  September 17, 2019  9:19 AM

## 2019-09-17 NOTE — DISCHARGE INSTRUCTIONS
"[facility]  Home Care Following Anesthesia  For 24 hours after surgery:  1. Get plenty of rest.  A responsible adult must stay with you for at least 24 hours after you leave the surgery center.  2. Do not drive or use heavy equipment.  If you have weakness or tingling, don't drive or use heavy equipment until this feeling goes away.   3. Do not drink alcohol.   4. Avoid strenuous or risky activities.  Ask for help when climbing stairs.  5. You may feel lightheaded.  IF so, sit for a few minutes before standing.  Have someone help you get up.   6. If you have nausea (feel sick to your stomach): Drink only clear liquids such as apple juice, ginger ale, broth or 7-Up.  Rest may also help.  Be sure to drink enough fluids.  Move to a regular diet as you feel able.   7. You may have a slight fever.  Call the doctor if your fever is over 100 F (37.7 C) (taken under the tongue) or lasts longer than 24 hours.  8. You may have a dry mouth, a sore throat, muscle aches or trouble sleeping. These should go away after 24 hours.  9. Do not make important or legal decisions.        Today you received a Marcaine or bupivacaine block to numb the nerves near your surgery site.  This is a block using local anesthetic or \"numbing\" medication injected around the nerves to anesthetize or \"numb\" the area supplied by those nerves.  This block is injected into the muscle layer near your surgical site.  The medication may numb the location where you had surgery for 6-18 hours, but may last up to 24 hours.  If your surgical site is an arm or leg you should be careful with your affected limb, since it is possible to injure your limb without being aware of it due to the numbing.  Until full feeling returns, you should guard against bumping or hitting your limb, and avoid extreme hot or cold temperatures on the skin.  As the block wears off, the feeling will return as a tingling or prickly sensation near your " surgical site.  You will experience more discomfort from your incision as the feeling returns.  You may want to take a pain pill (a narcotic or Tylenol if this was prescribed by your surgeon) when you start to experience mild pain before the pain beccomes more severe.  If your pain medications do not control your pain you should notifiy your surgeon.    Tips for taking pain medications  To get the best pain relief possible, remember these points:    Take pain medications as directed, before pain becomes severe.    Pain medication can upset your stomach: taking it with food may help.    Constipation is a common side effect of pain medication. Drink plenty of  fluids.    Eat foods high in fiber. Take a stool softener if recommended by your doctor or pharmacist.    Do not drink alcohol, drive or operate machinery while taking pain medications.    Ask about other ways to control pain, such as with heat, ice or relaxation.    Tylenol/Acetaminophen Consumption  To help encourage the safe use of acetaminophen, the makers of TYLENOL  have lowered the maximum daily dose for single-ingredient Extra Strength TYLENOL  (acetaminophen) products sold in the [place] from 8 pills per day (4,000 mg) to 6 pills per day (3,000 mg). The dosing interval has also changed from 2 pills every 4-6 hours to 2 pills every 6 hours.    If you feel your pain relief is insufficient, you may take Tylenol/Acetaminophen in addition to your narcotic pain medication.     Be careful not to exceed 3,000 mg of Tylenol/Acetaminophen in a 24 hour period from all sources.    If you are taking extra strength Tylenol/acetaminophen (500 mg), the maximum dose is 6 tablets in 24 hours.    If you are taking regular strength acetaminophen (325 mg), the maximum dose is 9 tablets in 24 hours.    Tylenol 975mg given at 6:35am. Next dose available after 12:35pm. Then follow package instructions.     Call a doctor for any of the followin. Signs of infection (fever,  growing tenderness at the surgery site, a large amount of drainage or bleeding, severe pain, foul-smelling drainage, redness, swelling).  2. It has been over 8 to 10 hours since surgery and you are still not able to urinate (pass water).  3. Headache for over 24 hours.    Your doctor is:  Dr. Henri Santos, Prostate and Urology: [phone]                  Or dial [phone] and ask for the resident on call for:  Prostate Urology  For emergency care, call the:  [facility]:  [phone] (TTY for hearing impaired: [phone])

## 2019-09-19 ENCOUNTER — PATIENT OUTREACH (OUTPATIENT)
Dept: UROLOGY | Facility: CLINIC | Age: 29
End: 2019-09-19

## 2019-09-19 LAB — COPATH REPORT: NORMAL

## 2019-09-24 ENCOUNTER — PRE VISIT (OUTPATIENT)
Dept: UROLOGY | Facility: CLINIC | Age: 29
End: 2019-09-24

## 2019-09-24 NOTE — TELEPHONE ENCOUNTER
Reason for Visit: post operative check S/P bilateral simple scrotal orchiectomy    Diagnosis: gender confirmation    Orders/Procedures/Records: in system    Contact Patient: n/a    Rooming Requirements: marty Noyola LPN  09/24/19  11:26 AM

## 2019-12-23 DIAGNOSIS — F64.9 GENDER DYSPHORIA: ICD-10-CM

## 2019-12-23 RX ORDER — ESTRADIOL VALERATE 20 MG/ML
10 INJECTION INTRAMUSCULAR WEEKLY
Qty: 5 ML | Refills: 1 | Status: SHIPPED | OUTPATIENT
Start: 2019-12-23 | End: 2019-12-31

## 2019-12-23 NOTE — TELEPHONE ENCOUNTER

## 2019-12-31 DIAGNOSIS — F64.9 GENDER DYSPHORIA: ICD-10-CM

## 2019-12-31 RX ORDER — ESTRADIOL VALERATE 20 MG/ML
10 INJECTION INTRAMUSCULAR WEEKLY
Qty: 5 ML | Refills: 1 | Status: SHIPPED | OUTPATIENT
Start: 2019-12-31 | End: 2020-06-17

## 2019-12-31 NOTE — TELEPHONE ENCOUNTER
"Request for medication refill: Per Pharmacy estradiol valerate (DELESTROGEN) 20 MG/ML injection not covered by patient plan. The preferred alternative is Estradiol? Please advise     Providers if patient needs an appointment and you are willing to give a one month supply please refill for one month and  send a letter/MyChart using \".SMILLIMITEDREFILL\" .smillimited and route chart to \"[facility] \" (Giving one month refill in non controlled medications is strongly recommended before denial)    If refill has been denied, meaning absolutely no refills without visit, please complete the smart phrase \".smirxrefuse\" and route it to the \"P SMI MED REFILLS\"  pool to inform the patient and the pharmacy.    Yomaira Dumont, [facility]        "

## 2020-01-31 ENCOUNTER — TELEPHONE (OUTPATIENT)
Dept: PLASTIC SURGERY | Facility: CLINIC | Age: 30
End: 2020-01-31

## 2020-01-31 NOTE — TELEPHONE ENCOUNTER
1/31/20 Reached out to pt to follow up on a referral for Derm. Pt was unavailable LVM for pt to call back and update us on whether or not the referral was still needed. Pt was also offered assistance navigating that process.     Pamela Alaniz  [facility]  Intake and Referral Coordinator

## 2020-03-11 ENCOUNTER — HEALTH MAINTENANCE LETTER (OUTPATIENT)
Age: 30
End: 2020-03-11

## 2020-08-02 ENCOUNTER — MYC REFILL (OUTPATIENT)
Dept: FAMILY MEDICINE | Facility: CLINIC | Age: 30
End: 2020-08-02

## 2020-08-02 DIAGNOSIS — F64.9 GENDER DYSPHORIA: ICD-10-CM

## 2020-08-02 RX ORDER — ESTRADIOL VALERATE 20 MG/ML
10 INJECTION INTRAMUSCULAR WEEKLY
Qty: 5 ML | Refills: 1 | Status: CANCELLED | OUTPATIENT
Start: 2020-08-02

## 2020-08-04 DIAGNOSIS — F64.9 GENDER DYSPHORIA: ICD-10-CM

## 2020-08-04 NOTE — TELEPHONE ENCOUNTER

## 2020-08-05 RX ORDER — NEEDLES, DISPOSABLE 25GX5/8"
NEEDLE, DISPOSABLE MISCELLANEOUS
Qty: 14 EACH | Refills: 3 | Status: SHIPPED | OUTPATIENT
Start: 2020-08-05 | End: 2022-03-11

## 2020-09-10 DIAGNOSIS — F64.9 GENDER DYSPHORIA: ICD-10-CM

## 2020-09-10 NOTE — TELEPHONE ENCOUNTER

## 2020-09-11 RX ORDER — SYRINGE, DISPOSABLE, 1 ML
SYRINGE, EMPTY DISPOSABLE MISCELLANEOUS
Qty: 14 EACH | Refills: 3 | Status: SHIPPED | OUTPATIENT
Start: 2020-09-11 | End: 2022-03-10

## 2020-09-11 RX ORDER — NEEDLES, DISPOSABLE 25GX5/8"
NEEDLE, DISPOSABLE MISCELLANEOUS
Qty: 14 EACH | Refills: 3 | Status: SHIPPED | OUTPATIENT
Start: 2020-09-11 | End: 2022-03-10

## 2020-12-27 ENCOUNTER — HEALTH MAINTENANCE LETTER (OUTPATIENT)
Age: 30
End: 2020-12-27

## 2021-01-29 ENCOUNTER — VIRTUAL VISIT (OUTPATIENT)
Dept: FAMILY MEDICINE | Facility: CLINIC | Age: 31
End: 2021-01-29
Payer: COMMERCIAL

## 2021-01-29 DIAGNOSIS — F64.9 GENDER DYSPHORIA: ICD-10-CM

## 2021-01-29 PROCEDURE — 99212 OFFICE O/P EST SF 10 MIN: CPT | Mod: 95 | Performed by: STUDENT IN AN ORGANIZED HEALTH CARE EDUCATION/TRAINING PROGRAM

## 2021-01-29 RX ORDER — ESTRADIOL VALERATE 20 MG/ML
10 INJECTION INTRAMUSCULAR WEEKLY
Qty: 5 ML | Refills: 0 | Status: CANCELLED | OUTPATIENT
Start: 2021-01-29

## 2021-01-29 NOTE — PROGRESS NOTES
Jessica is a 31 year old who is being evaluated via a billable telephone visit.      1. Gender dysphoria  Due to patient not being able to come get labs checked despite risk lowering ways of getting them checked (early AM lab only visit or even a home nurse) I discussed I would not continue to refill her estradiol. She has been told about this on previous visits and via letters.  If she reconsiders in the future of coming to get labs checked with us I would be happy to discuss refilling. She stated she understood. When asked what she would do about estradiol going forward she said said she just didn't know. No follow up established.       Subjective     Jessica is a 31 year old who presents to clinic today for the following health issues  HRT follow up    HPI     See last note- had wanted her to come in for lab work as part of safety monitoring while on estradiol. Patient is s/p orchiectomy. Has been June 2019 since checked. Today replies.     Getting labs would be difficult. No transport. Trying to stay in with COVID still and would not come in. Discussed about safety of checking at least yearly which she said she understood- but still disagreed a refill would be appropriate at this time due to her running out soon. To lower risk I offered a early AM lab visit with staff who have been vaccinated and she declined. Also would not be open to home nursing to come check.     She also had inquired about starting progesterone for breast development            Review of Systems   Constitutional, HEENT, cardiovascular, pulmonary, gi and gu systems are negative, except as otherwise noted.      Objective           Vitals:  No vitals were obtained today due to virtual visit.    Physical Exam   healthy, alert and no distress  PSYCH: Alert and oriented times 3; coherent speech, normal   rate and volume, able to articulate logical thoughts, able   to abstract reason, no tangential thoughts, no hallucinations   or delusions  Her  affect is normal  RESP: No cough, no audible wheezing, able to talk in full sentences  Remainder of exam unable to be completed due to telephone visits            Phone call duration: 17 minutes

## 2021-01-29 NOTE — PROGRESS NOTES
Preceptor Attestation:  I discussed the patient with the resident. I have verified the content of the note, which accurately reflects my assessment of the patient and the plan of care.   Supervising Physician:  Lizzy Cramer DO

## 2021-01-30 ASSESSMENT — ASTHMA QUESTIONNAIRES: ACT_TOTALSCORE: 25

## 2021-02-01 DIAGNOSIS — F64.9 GENDER DYSPHORIA: Primary | ICD-10-CM

## 2021-02-01 DIAGNOSIS — F64.9 GENDER DYSPHORIA: ICD-10-CM

## 2021-02-01 LAB
ALBUMIN SERPL-MCNC: 4.2 MG/DL (ref 3.8–5)
ALP SERPL-CCNC: 42 U/L (ref 31.7–110.7)
ALT SERPL-CCNC: 16.7 U/L (ref 0–45)
AST SERPL-CCNC: 22.1 U/L (ref 0–55)
BILIRUB SERPL-MCNC: 0.7 MG/DL (ref 0.2–1.3)
BILIRUBIN DIRECT: 0.2 MG/DL (ref 0.1–0.3)
CHOLEST SERPL-MCNC: 121.2 MG/DL (ref 0–200)
CHOLEST/HDLC SERPL: 2 {RATIO} (ref 0–5)
HCT VFR BLD AUTO: 44.5 % (ref 40–53)
HDLC SERPL-MCNC: 59.4 MG/DL
HEMOGLOBIN: 14.2 G/DL (ref 13.3–17.7)
LDLC SERPL CALC-MCNC: 52 MG/DL (ref 0–129)
MCH RBC QN AUTO: 30.2 PG (ref 26.5–35)
MCHC RBC AUTO-ENTMCNC: 31.9 G/DL (ref 32–36)
MCV RBC AUTO: 94.6 FL (ref 78–100)
PLATELET # BLD AUTO: 165 K/UL (ref 150–450)
PROT SERPL-MCNC: 6.2 G/DL (ref 6.8–8.8)
RBC # BLD AUTO: 4.7 M/UL (ref 4.4–5.9)
TRIGL SERPL-MCNC: 49.8 MG/DL (ref 0–150)
VLDL CHOLESTEROL: 10 MG/DL (ref 7–32)
WBC # BLD AUTO: 4.8 K/UL (ref 4–11)

## 2021-02-01 PROCEDURE — 99000 SPECIMEN HANDLING OFFICE-LAB: CPT | Performed by: FAMILY MEDICINE

## 2021-02-01 PROCEDURE — 80061 LIPID PANEL: CPT

## 2021-02-01 PROCEDURE — 80076 HEPATIC FUNCTION PANEL: CPT

## 2021-02-01 PROCEDURE — 36415 COLL VENOUS BLD VENIPUNCTURE: CPT

## 2021-02-01 PROCEDURE — 85027 COMPLETE CBC AUTOMATED: CPT

## 2021-02-01 PROCEDURE — 84403 ASSAY OF TOTAL TESTOSTERONE: CPT | Performed by: FAMILY MEDICINE

## 2021-02-03 DIAGNOSIS — F64.9 GENDER DYSPHORIA: ICD-10-CM

## 2021-02-03 LAB — TESTOST SERPL-MCNC: 17 NG/DL (ref 240–950)

## 2021-02-03 RX ORDER — ESTRADIOL VALERATE 20 MG/ML
10 INJECTION INTRAMUSCULAR WEEKLY
Qty: 5 ML | Refills: 0 | Status: SHIPPED | OUTPATIENT
Start: 2021-02-03 | End: 2021-04-09

## 2021-04-09 ENCOUNTER — MYC REFILL (OUTPATIENT)
Dept: FAMILY MEDICINE | Facility: CLINIC | Age: 31
End: 2021-04-09

## 2021-04-09 DIAGNOSIS — F64.9 GENDER DYSPHORIA: ICD-10-CM

## 2021-04-12 RX ORDER — ESTRADIOL VALERATE 20 MG/ML
10 INJECTION INTRAMUSCULAR WEEKLY
Qty: 5 ML | Refills: 1 | Status: SHIPPED | OUTPATIENT
Start: 2021-04-12 | End: 2021-08-06

## 2021-04-25 ENCOUNTER — HEALTH MAINTENANCE LETTER (OUTPATIENT)
Age: 31
End: 2021-04-25

## 2021-08-05 ENCOUNTER — MYC MEDICAL ADVICE (OUTPATIENT)
Dept: FAMILY MEDICINE | Facility: CLINIC | Age: 31
End: 2021-08-05

## 2021-08-05 DIAGNOSIS — F64.9 GENDER DYSPHORIA: ICD-10-CM

## 2021-08-06 RX ORDER — ESTRADIOL VALERATE 20 MG/ML
10 INJECTION INTRAMUSCULAR WEEKLY
Qty: 5 ML | Refills: 1 | Status: SHIPPED | OUTPATIENT
Start: 2021-08-06 | End: 2021-11-17

## 2021-08-06 NOTE — TELEPHONE ENCOUNTER
"See MyChart message. Patient requesting refill of estradiol. Last office visit virtual on 1/29/21, unable to see follow-up recommendation (per patient 1x per year). Routing to provider to refill if appropriate.     Request for medication refill:    Providers if patient needs an appointment and you are willing to give a one month supply please refill for one month and  send a letter/MyChart using \".SMILLIMITEDREFILL\" .smillimited and route chart to \"P [facility]MI \" (Giving one month refill in non controlled medications is strongly recommended before denial)    If refill has been denied, meaning absolutely no refills without visit, please complete the smart phrase \".smirxrefuse\" and route it to the \"P [facility] MED REFILLS\"  pool to inform the patient and the pharmacy.    Valerie Sandy RN      "

## 2021-10-09 ENCOUNTER — HEALTH MAINTENANCE LETTER (OUTPATIENT)
Age: 31
End: 2021-10-09

## 2021-11-10 DIAGNOSIS — F64.9 GENDER DYSPHORIA: ICD-10-CM

## 2021-11-16 NOTE — TELEPHONE ENCOUNTER

## 2021-11-17 RX ORDER — ESTRADIOL VALERATE 20 MG/ML
INJECTION INTRAMUSCULAR
Qty: 5 ML | Refills: 1 | Status: SHIPPED | OUTPATIENT
Start: 2021-11-17 | End: 2022-04-01

## 2022-03-10 DIAGNOSIS — F64.9 GENDER DYSPHORIA: ICD-10-CM

## 2022-03-10 RX ORDER — NEEDLES, DISPOSABLE 25GX5/8"
NEEDLE, DISPOSABLE MISCELLANEOUS
Qty: 14 EACH | Refills: 3 | Status: SHIPPED | OUTPATIENT
Start: 2022-03-10 | End: 2022-06-22

## 2022-03-10 RX ORDER — SYRINGE, DISPOSABLE, 1 ML
SYRINGE, EMPTY DISPOSABLE MISCELLANEOUS
Qty: 14 EACH | Refills: 3 | Status: SHIPPED | OUTPATIENT
Start: 2022-03-10 | End: 2022-06-22

## 2022-03-10 NOTE — TELEPHONE ENCOUNTER
"Request for medication refill:  B-D SYRINGE LUER-SHWETA 1 ML MISC  Providers if patient needs an appointment and you are willing to give a one month supply please refill for one month and  send a letter/MyChart using \".SMILLIMITEDREFILL\" .smillimited and route chart to \"P [facility] \" (Giving one month refill in non controlled medications is strongly recommended before denial)    If refill has been denied, meaning absolutely no refills without visit, please complete the smart phrase \".smirxrefuse\" and route it to the \"P [facility] MED REFILLS\"  pool to inform the patient and the pharmacy.    Jessica Rowe        "

## 2022-03-11 DIAGNOSIS — F64.9 GENDER DYSPHORIA: ICD-10-CM

## 2022-03-11 RX ORDER — NEEDLES, DISPOSABLE 25GX5/8"
NEEDLE, DISPOSABLE MISCELLANEOUS
Qty: 14 EACH | Refills: 3 | Status: SHIPPED | OUTPATIENT
Start: 2022-03-11 | End: 2022-06-22

## 2022-03-11 NOTE — TELEPHONE ENCOUNTER
"Request for medication refill:  BD HYPODERMIC NEEDLE 18G X 1\" MISC    Providers if patient needs an appointment and you are willing to give a one month supply please refill for one month and  send a letter/MyChart using \".SMILLIMITEDREFILL\" .smillimited and route chart to \"P [facility] \" (Giving one month refill in non controlled medications is strongly recommended before denial)    If refill has been denied, meaning absolutely no refills without visit, please complete the smart phrase \".smirxrefuse\" and route it to the \"P [facility] MED REFILLS\"  pool to inform the patient and the pharmacy.    Vonnie Miller MA        "

## 2022-03-30 DIAGNOSIS — F64.9 GENDER DYSPHORIA: ICD-10-CM

## 2022-04-01 RX ORDER — ESTRADIOL VALERATE 20 MG/ML
INJECTION INTRAMUSCULAR
Qty: 5 ML | Refills: 1 | OUTPATIENT
Start: 2022-04-01

## 2022-04-01 NOTE — TELEPHONE ENCOUNTER
"Request for medication refill: Estradiol valerate     Providers if patient needs an appointment and you are willing to give a one month supply please refill for one month and  send a letter/MyChart using \".SMILLIMITEDREFILL\" .smillimited and route chart to \"P [facility] \" (Giving one month refill in non controlled medications is strongly recommended before denial)    If refill has been denied, meaning absolutely no refills without visit, please complete the smart phrase \".smirxrefuse\" and route it to the \"P [facility] MED REFILLS\"  pool to inform the patient and the pharmacy.    Daniella Lopez        "

## 2022-04-01 NOTE — TELEPHONE ENCOUNTER
Medication Refill Denied  Reason: Patient needs: provider visit  Provider: I have not called the patient about the Rx denial, please call.  PCS: Please notify the pharmacy, Please contact the patient to explain reasoning provided above and to schedule the patient for a provider visit with any provider.    Once patient makes an appointment please send back to me to    order temporary refill so that the patient will not run out of medication prior to the scheduled visit.    Kriss Woodruff, DO

## 2022-04-05 NOTE — TELEPHONE ENCOUNTER
Called patient and left message requesting call back to schedule an appointment. Also sent [employer] message informing patient of need to schedule before we can provide refills.

## 2022-04-14 ENCOUNTER — OFFICE VISIT (OUTPATIENT)
Dept: FAMILY MEDICINE | Facility: CLINIC | Age: 32
End: 2022-04-14

## 2022-04-14 VITALS
DIASTOLIC BLOOD PRESSURE: 76 MMHG | WEIGHT: 113 LBS | SYSTOLIC BLOOD PRESSURE: 114 MMHG | RESPIRATION RATE: 16 BRPM | TEMPERATURE: 97.9 F | OXYGEN SATURATION: 95 % | HEART RATE: 83 BPM | BODY MASS INDEX: 14.91 KG/M2

## 2022-04-14 DIAGNOSIS — F64.9 GENDER DYSPHORIA: Primary | ICD-10-CM

## 2022-04-14 PROCEDURE — 99214 OFFICE O/P EST MOD 30 MIN: CPT | Performed by: FAMILY MEDICINE

## 2022-04-14 RX ORDER — PROGESTERONE 200 MG/1
200 CAPSULE ORAL DAILY
Qty: 90 CAPSULE | Refills: 1 | Status: SHIPPED | OUTPATIENT
Start: 2022-04-14 | End: 2022-10-31

## 2022-04-14 RX ORDER — ESTRADIOL VALERATE 20 MG/ML
INJECTION INTRAMUSCULAR
Qty: 15 ML | Refills: 1 | Status: SHIPPED | OUTPATIENT
Start: 2022-04-14 | End: 2022-06-15

## 2022-04-14 NOTE — PROGRESS NOTES
Assessment & Plan     Gender dysphoria  Hgb, lipid, LFTs have been stable, will check every other year  Ordered estradiol, return for lab visit for midcycle level  Refilled estrogen  Will start prometrium  - Estradiol; Future  - estradiol valerate (DELESTROGEN) 20 MG/ML injection; ADMINISTER 0.5 ML(10 MG) IN THE MUSCLE 1 TIME A WEEK  - progesterone (PROMETRIUM) 200 MG capsule; Take 1 capsule (200 mg) by mouth daily    No follow-ups on file.    Kriss Woodruff DO  [facility] KEUTRAH Lopez is a 32 year old who presents for the following health issues:    HPI     Gender Dysphoria   - currently taking estradiol valerate 0.5mL/10mg weekly. Has been on the same dose for about 3 years. Injection day: usually Monday.   - interested in progesterone   - Not taking finasteride, TT 2/1/21 was 17. S/p orchiectomy 2-3 years ago.    Review of Systems   Constitutional, HEENT, cardiovascular, pulmonary, gi and gu systems are negative, except as otherwise noted.      Objective    /76   Pulse 83   Temp 97.9  F (36.6  C) (Oral)   Resp 16   Wt 51.3 kg (113 lb)   SpO2 95%   BMI 14.91 kg/m    Body mass index is 14.91 kg/m .  Physical Exam   GENERAL: healthy, alert and no distress  RESP: lungs clear to auscultation - no rales, rhonchi or wheezes  CV: regular rate and rhythm, normal S1 S2, no S3 or S4, no murmur, click or rub, no peripheral edema and peripheral pulses strong  MS: no gross musculoskeletal defects noted, no edema

## 2022-05-21 ENCOUNTER — HEALTH MAINTENANCE LETTER (OUTPATIENT)
Age: 32
End: 2022-05-21

## 2022-06-10 ENCOUNTER — OFFICE VISIT (OUTPATIENT)
Dept: FAMILY MEDICINE | Facility: CLINIC | Age: 32
End: 2022-06-10
Payer: COMMERCIAL

## 2022-06-10 VITALS
RESPIRATION RATE: 14 BRPM | SYSTOLIC BLOOD PRESSURE: 102 MMHG | OXYGEN SATURATION: 96 % | DIASTOLIC BLOOD PRESSURE: 71 MMHG | WEIGHT: 110.4 LBS | HEART RATE: 91 BPM | TEMPERATURE: 98.5 F | BODY MASS INDEX: 14.57 KG/M2

## 2022-06-10 DIAGNOSIS — K90.9 FATTY STOOLS: Primary | ICD-10-CM

## 2022-06-10 DIAGNOSIS — F64.9 GENDER DYSPHORIA: ICD-10-CM

## 2022-06-10 DIAGNOSIS — R62.7 POOR WEIGHT GAIN IN ADULT: ICD-10-CM

## 2022-06-10 LAB
ALBUMIN SERPL-MCNC: 3.6 G/DL (ref 3.4–5)
ALP SERPL-CCNC: 45 U/L (ref 40–150)
ALT SERPL W P-5'-P-CCNC: 23 U/L (ref 0–70)
ANION GAP SERPL CALCULATED.3IONS-SCNC: 5 MMOL/L (ref 3–14)
AST SERPL W P-5'-P-CCNC: 18 U/L (ref 0–45)
BILIRUB SERPL-MCNC: 0.9 MG/DL (ref 0.2–1.3)
BUN SERPL-MCNC: 10 MG/DL (ref 7–30)
CALCIUM SERPL-MCNC: 8.9 MG/DL (ref 8.5–10.1)
CHLORIDE BLD-SCNC: 107 MMOL/L (ref 94–109)
CO2 SERPL-SCNC: 28 MMOL/L (ref 20–32)
CREAT SERPL-MCNC: 0.62 MG/DL (ref 0.52–1.25)
ESTRADIOL SERPL-MCNC: 1433 PG/ML
GFR SERPL CREATININE-BSD FRML MDRD: >90 ML/MIN/1.73M2
GLUCOSE BLD-MCNC: 80 MG/DL (ref 70–99)
POTASSIUM BLD-SCNC: 4.3 MMOL/L (ref 3.4–5.3)
PROT SERPL-MCNC: 6.4 G/DL (ref 6.8–8.8)
SODIUM SERPL-SCNC: 140 MMOL/L (ref 133–144)
TSH SERPL DL<=0.005 MIU/L-ACNC: 1.58 MU/L (ref 0.4–4)

## 2022-06-10 PROCEDURE — 82784 ASSAY IGA/IGD/IGG/IGM EACH: CPT | Performed by: STUDENT IN AN ORGANIZED HEALTH CARE EDUCATION/TRAINING PROGRAM

## 2022-06-10 PROCEDURE — 86364 TISS TRNSGLTMNASE EA IG CLAS: CPT | Performed by: STUDENT IN AN ORGANIZED HEALTH CARE EDUCATION/TRAINING PROGRAM

## 2022-06-10 PROCEDURE — 36415 COLL VENOUS BLD VENIPUNCTURE: CPT | Performed by: STUDENT IN AN ORGANIZED HEALTH CARE EDUCATION/TRAINING PROGRAM

## 2022-06-10 PROCEDURE — 80053 COMPREHEN METABOLIC PANEL: CPT | Performed by: STUDENT IN AN ORGANIZED HEALTH CARE EDUCATION/TRAINING PROGRAM

## 2022-06-10 PROCEDURE — 82670 ASSAY OF TOTAL ESTRADIOL: CPT | Performed by: STUDENT IN AN ORGANIZED HEALTH CARE EDUCATION/TRAINING PROGRAM

## 2022-06-10 PROCEDURE — 99214 OFFICE O/P EST MOD 30 MIN: CPT | Mod: GC | Performed by: STUDENT IN AN ORGANIZED HEALTH CARE EDUCATION/TRAINING PROGRAM

## 2022-06-10 PROCEDURE — 84443 ASSAY THYROID STIM HORMONE: CPT | Performed by: STUDENT IN AN ORGANIZED HEALTH CARE EDUCATION/TRAINING PROGRAM

## 2022-06-10 ASSESSMENT — ASTHMA QUESTIONNAIRES
QUESTION_3 LAST FOUR WEEKS HOW OFTEN DID YOUR ASTHMA SYMPTOMS (WHEEZING, COUGHING, SHORTNESS OF BREATH, CHEST TIGHTNESS OR PAIN) WAKE YOU UP AT NIGHT OR EARLIER THAN USUAL IN THE MORNING: NOT AT ALL
QUESTION_4 LAST FOUR WEEKS HOW OFTEN HAVE YOU USED YOUR RESCUE INHALER OR NEBULIZER MEDICATION (SUCH AS ALBUTEROL): NOT AT ALL
QUESTION_5 LAST FOUR WEEKS HOW WOULD YOU RATE YOUR ASTHMA CONTROL: COMPLETELY CONTROLLED
ACT_TOTALSCORE: 25
ACT_TOTALSCORE: 25
QUESTION_1 LAST FOUR WEEKS HOW MUCH OF THE TIME DID YOUR ASTHMA KEEP YOU FROM GETTING AS MUCH DONE AT WORK, SCHOOL OR AT HOME: NONE OF THE TIME
QUESTION_2 LAST FOUR WEEKS HOW OFTEN HAVE YOU HAD SHORTNESS OF BREATH: NOT AT ALL

## 2022-06-10 NOTE — PROGRESS NOTES
Assessment & Plan     Fatty stools  Poor weight gain in adult  Several months of digestive issues concerning for malabsorption. Although Jessica does not report specific abdominal pain do recommend testing for celiac disease, does report increased flatulence and loose stools. Will also have her check for pancreatic function with fecal elastase testing, as well as thyroid function. I also have concerns that malabsorption could cause electrolyte abnormalities so will check CMP today. Will report results to patient via [employer] and follow up based on results.   - Tissue transglutaminase antibody IgA  - IgA  - Elastase Fecal  - Comprehensive metabolic panel  - TSH with free T4 reflex      Gender dysphoria  Due for labs and is mid-cycle today. Patient does have some concern about if estrogen could be causing issues with weight gain. I have low suspicion for this at this time but will continue to monitor.   - Estradiol        No follow-ups on file.    Radha Guillermo MD  [facility] KETURAH Lopez is a 32 year old who presents for the following health issues:    HPI     Having digestive problems. Started a few months ago. Ate buttered popcorn and had loose oil in stool and felt this was the cause of loose stool. Has had similar issues since when eating more high fat foods. Not having stomach pain. No nausea or vomiting.     Changes in stool: lighter in color, lighter brown in color, some diarrhea. Has not noticed any blood in stool.     Uncertain if loosing weight but is unable to gain weight. Feels as though she is noticing smells of stool reflect the food she eats. Concerned she is not digesting food well.     Biggest concern is wanting to gain weight and feels this is part of the issue. Also worried that estradiol could be a contributing factor. Started estradiol several years ago.     ACT score 25 today    Weight down to 110.4 lbs    Review of Systems   ROS otherwise negative if not stated  in HPI        Objective    /71 (BP Location: Left arm, Patient Position: Sitting, Cuff Size: Adult Small)   Pulse 91   Temp 98.5  F (36.9  C) (Oral)   Resp 14   Wt 50.1 kg (110 lb 6.4 oz)   SpO2 96%   BMI 14.57 kg/m    Body mass index is 14.57 kg/m .  Physical Exam   GENERAL: alert, very thin/slight build  NECK: no adenopathy, no asymmetry, masses, or scars and thyroid normal to palpation  RESP: lungs clear to auscultation - no rales, rhonchi or wheezes  CV: regular rate and rhythm, normal S1 S2, no S3 or S4, no murmur, click or rub, no peripheral edema and peripheral pulses strong  ABDOMEN: soft, nontender, no hepatosplenomegaly, no masses and bowel sounds normal  MS: no gross musculoskeletal defects noted, no edema  SKIN: no suspicious lesions or rashes  NEURO: Normal strength and tone, mentation intact and speech normal  PSYCH: mentation appears normal, affect normal/bright      Results for orders placed or performed in visit on 06/10/22   Estradiol     Status: None   Result Value Ref Range    Estradiol 1,433 pg/mL    Narrative    The sex of this patient cannot be reliably determined based on discrepancies in demographics (legal sex, sex assigned at birth, gender identity).  Both male and female reference ranges are provided where applicable.  Careful evaluation of the patient s results as compared to the gender specific reference intervals is required in this setting.    Tissue transglutaminase antibody IgA     Status: Normal   Result Value Ref Range    Tissue Transglutaminase Antibody IgA <0.2 <7.0 U/mL   IgA     Status: Normal   Result Value Ref Range    Immunoglobulin A 110 84 - 499 mg/dL   Comprehensive metabolic panel     Status: Abnormal   Result Value Ref Range    Sodium 140 133 - 144 mmol/L    Potassium 4.3 3.4 - 5.3 mmol/L    Chloride 107 94 - 109 mmol/L    Carbon Dioxide (CO2) 28 20 - 32 mmol/L    Anion Gap 5 3 - 14 mmol/L    Urea Nitrogen 10 7 - 30 mg/dL    Creatinine 0.62 0.52 - 1.25  mg/dL    Calcium 8.9 8.5 - 10.1 mg/dL    Glucose 80 70 - 99 mg/dL    Alkaline Phosphatase 45 40 - 150 U/L    AST 18 0 - 45 U/L    ALT 23 0 - 70 U/L    Protein Total 6.4 (L) 6.8 - 8.8 g/dL    Albumin 3.6 3.4 - 5.0 g/dL    Bilirubin Total 0.9 0.2 - 1.3 mg/dL    GFR Estimate >90 >60 mL/min/1.73m2    Narrative    The sex of this patient cannot be reliably determined based on discrepancies in demographics (legal sex, sex assigned at birth, gender identity).  Both male and female reference ranges are provided where applicable.  Careful evaluation of the patient s results as compared to the gender specific reference intervals is required in this setting.    TSH with free T4 reflex     Status: Normal   Result Value Ref Range    TSH 1.58 0.40 - 4.00 mU/L

## 2022-06-13 LAB
IGA SERPL-MCNC: 110 MG/DL (ref 84–499)
TTG IGA SER-ACNC: <0.2 U/ML

## 2022-06-15 DIAGNOSIS — F64.9 GENDER DYSPHORIA: Primary | ICD-10-CM

## 2022-06-16 ENCOUNTER — LAB (OUTPATIENT)
Dept: LAB | Facility: CLINIC | Age: 32
End: 2022-06-16
Payer: COMMERCIAL

## 2022-06-16 DIAGNOSIS — F64.9 GENDER DYSPHORIA: ICD-10-CM

## 2022-06-16 DIAGNOSIS — R62.7 POOR WEIGHT GAIN IN ADULT: ICD-10-CM

## 2022-06-16 DIAGNOSIS — K90.9 FATTY STOOLS: ICD-10-CM

## 2022-06-16 PROCEDURE — 82653 EL-1 FECAL QUANTITATIVE: CPT

## 2022-06-17 LAB — ELASTASE PANC STL-MCNT: >800 UG/G

## 2022-06-29 NOTE — PROGRESS NOTES
Preceptor Attestation:   Patient seen, evaluated and discussed with the resident. I have verified the content of the note, which accurately reflects my assessment of the patient and the plan of care.   Supervising Physician:  Kriss Woodruff, DO

## 2022-07-07 ENCOUNTER — LAB (OUTPATIENT)
Dept: LAB | Facility: CLINIC | Age: 32
End: 2022-07-07
Payer: COMMERCIAL

## 2022-07-07 ENCOUNTER — HOSPITAL ENCOUNTER (OUTPATIENT)
Facility: CLINIC | Age: 32
Discharge: HOME | End: 2022-07-07
Attending: FAMILY MEDICINE | Admitting: FAMILY MEDICINE
Payer: COMMERCIAL

## 2022-07-07 DIAGNOSIS — F64.9 GENDER DYSPHORIA: ICD-10-CM

## 2022-07-07 LAB — ESTRADIOL SERPL-MCNC: 825 PG/ML

## 2022-07-07 PROCEDURE — 99000 SPECIMEN HANDLING OFFICE-LAB: CPT | Performed by: PATHOLOGY

## 2022-07-07 PROCEDURE — 36415 COLL VENOUS BLD VENIPUNCTURE: CPT | Performed by: PATHOLOGY

## 2022-07-07 PROCEDURE — 82670 ASSAY OF TOTAL ESTRADIOL: CPT

## 2022-07-22 ENCOUNTER — MYC MEDICAL ADVICE (OUTPATIENT)
Dept: UROLOGY | Facility: CLINIC | Age: 32
End: 2022-07-22

## 2022-07-25 NOTE — TELEPHONE ENCOUNTER
Called pt to follow up on [employer] message detailing a desire for vaginoplasty with Dr Cutler. Called pt to discuss surgical goals, next steps in planning for surgery, and scheduling an appointment with Dr Cutler. Unable to reach or leave voicemail. Will respond to [employer] message with callback number.    Dane RIZVI RN

## 2022-07-28 ENCOUNTER — TELEPHONE (OUTPATIENT)
Dept: PLASTIC SURGERY | Facility: CLINIC | Age: 32
End: 2022-07-28

## 2022-07-28 NOTE — TELEPHONE ENCOUNTER
Pt called this RN asking about next steps in working toward vaginoplasty. Pt was last seen by Dr Cutler in 2019 for gender affirming bottom surgery consult. Pt has bilateral orchiectomy in 2019. Pt desires full depth vaginoplasty but has not yet started hair removal. Discussed the process of working toward surgery. Pt intends to begin hair removal as soon as possible, work on getting 2 letters of support (understanding that they  one year after written), and will schedule an appointment with Dr Cutler for a hair removal check when it is nearly completed. Will send pt information via [facility] about letters of support and hair removal referrals with tips on how to secure insurance coverage.     Dane GARCIA RN

## 2022-08-08 ENCOUNTER — TELEPHONE (OUTPATIENT)
Dept: PLASTIC SURGERY | Facility: CLINIC | Age: 32
End: 2022-08-08

## 2022-08-08 NOTE — TELEPHONE ENCOUNTER
Called pt to follow up after receiving a phone call requesting information about how to get insurance to cover the cost of medically necessary hair removal for vaginoplasty. Unable to reach, left voicemail with callback number.

## 2022-08-11 ENCOUNTER — TELEPHONE (OUTPATIENT)
Dept: PLASTIC SURGERY | Facility: CLINIC | Age: 32
End: 2022-08-11

## 2022-08-11 NOTE — TELEPHONE ENCOUNTER
Pt called and left a voicemail requesting a return call. Called pt who stated she needs a diagnosis code and the CPT code in order to submit prior authorization to insurance for hair removal. Discussed with Dr Cutler who stated the diagnosis code for gender dysphoria is F64.0. Discussed this with pt and that [place] 467[address]. Pt has no other questions at this time.    GABINO Vela

## 2022-09-17 ENCOUNTER — HEALTH MAINTENANCE LETTER (OUTPATIENT)
Age: 32
End: 2022-09-17

## 2022-09-21 ENCOUNTER — TELEPHONE (OUTPATIENT)
Dept: GASTROENTEROLOGY | Facility: CLINIC | Age: 32
End: 2022-09-21

## 2022-10-28 ENCOUNTER — MYC MEDICAL ADVICE (OUTPATIENT)
Dept: FAMILY MEDICINE | Facility: CLINIC | Age: 32
End: 2022-10-28

## 2022-10-28 DIAGNOSIS — F64.9 GENDER DYSPHORIA: ICD-10-CM

## 2022-10-31 RX ORDER — PROGESTERONE 200 MG/1
200 CAPSULE ORAL DAILY
Qty: 90 CAPSULE | Refills: 1 | Status: SHIPPED | OUTPATIENT
Start: 2022-10-31 | End: 2023-02-06

## 2022-11-11 ENCOUNTER — TRANSFERRED RECORDS (OUTPATIENT)
Dept: HEALTH INFORMATION MANAGEMENT | Facility: CLINIC | Age: 32
End: 2022-11-11

## 2023-02-06 DIAGNOSIS — F64.9 GENDER DYSPHORIA: ICD-10-CM

## 2023-02-06 RX ORDER — PROGESTERONE 200 MG/1
CAPSULE ORAL
Qty: 90 CAPSULE | Refills: 1 | Status: SHIPPED | OUTPATIENT
Start: 2023-02-06 | End: 2023-11-06

## 2023-02-06 NOTE — TELEPHONE ENCOUNTER
"Request for medication refill:  progesterone (PROMETRIUM) 200 MG capsule    Providers if patient needs an appointment and you are willing to give a one month supply please refill for one month and  send a letter/MyChart using \".SMILLIMITEDREFILL\" .smillimited and route chart to \"P [facility] \" (Giving one month refill in non controlled medications is strongly recommended before denial)    If refill has been denied, meaning absolutely no refills without visit, please complete the smart phrase \".smirxrefuse\" and route it to the \"P [facility] MED REFILLS\"  pool to inform the patient and the pharmacy.    Alana Mcdaniel MA        "

## 2023-02-08 ENCOUNTER — TRANSFERRED RECORDS (OUTPATIENT)
Dept: HEALTH INFORMATION MANAGEMENT | Facility: CLINIC | Age: 33
End: 2023-02-08
Payer: COMMERCIAL

## 2023-02-17 ENCOUNTER — TRANSFERRED RECORDS (OUTPATIENT)
Dept: HEALTH INFORMATION MANAGEMENT | Facility: CLINIC | Age: 33
End: 2023-02-17
Payer: COMMERCIAL

## 2023-02-17 ENCOUNTER — MEDICAL CORRESPONDENCE (OUTPATIENT)
Dept: HEALTH INFORMATION MANAGEMENT | Facility: CLINIC | Age: 33
End: 2023-02-17

## 2023-03-17 ENCOUNTER — HOSPITAL ENCOUNTER (OUTPATIENT)
Dept: CT IMAGING | Facility: HOSPITAL | Age: 33
Discharge: HOME | End: 2023-03-17
Attending: NURSE PRACTITIONER | Admitting: NURSE PRACTITIONER
Payer: COMMERCIAL

## 2023-03-17 ENCOUNTER — TELEPHONE (OUTPATIENT)
Dept: PLASTIC SURGERY | Facility: CLINIC | Age: 33
End: 2023-03-17
Payer: COMMERCIAL

## 2023-03-17 DIAGNOSIS — R19.7 DIARRHEA, UNSPECIFIED TYPE: ICD-10-CM

## 2023-03-17 DIAGNOSIS — R63.4 ABNORMAL WEIGHT LOSS: ICD-10-CM

## 2023-03-17 DIAGNOSIS — R63.6 UNDERWEIGHT: ICD-10-CM

## 2023-03-17 DIAGNOSIS — E63.9 NUTRITIONAL DISORDER: ICD-10-CM

## 2023-03-17 PROCEDURE — 74177 CT ABD & PELVIS W/CONTRAST: CPT

## 2023-03-17 PROCEDURE — 250N000011 HC RX IP 250 OP 636: Performed by: NURSE PRACTITIONER

## 2023-03-17 RX ORDER — IOPAMIDOL 755 MG/ML
100 INJECTION, SOLUTION INTRAVASCULAR ONCE
Status: COMPLETED | OUTPATIENT
Start: 2023-03-17 | End: 2023-03-17

## 2023-03-17 RX ADMIN — IOPAMIDOL 100 ML: 755 INJECTION, SOLUTION INTRAVENOUS at 12:31

## 2023-03-17 NOTE — TELEPHONE ENCOUNTER
[facility] Call Center    Phone Message    May a detailed message be left on voicemail: yes     Reason for Call: Patient is ready to schedule with Dr. Cutler. Please call patient back. Thank you.    Action Taken: Message routed to:  [facility] Gender Care    Travel Screening: Not Applicable

## 2023-03-22 ENCOUNTER — TRANSFERRED RECORDS (OUTPATIENT)
Dept: HEALTH INFORMATION MANAGEMENT | Facility: CLINIC | Age: 33
End: 2023-03-22
Payer: COMMERCIAL

## 2023-06-01 ENCOUNTER — TRANSFERRED RECORDS (OUTPATIENT)
Dept: HEALTH INFORMATION MANAGEMENT | Facility: CLINIC | Age: 33
End: 2023-06-01
Payer: COMMERCIAL

## 2023-06-04 ENCOUNTER — HEALTH MAINTENANCE LETTER (OUTPATIENT)
Age: 33
End: 2023-06-04

## 2023-06-13 ENCOUNTER — OFFICE VISIT (OUTPATIENT)
Dept: PLASTIC SURGERY | Facility: CLINIC | Age: 33
End: 2023-06-13
Payer: COMMERCIAL

## 2023-06-13 VITALS — HEART RATE: 80 BPM | OXYGEN SATURATION: 96 % | SYSTOLIC BLOOD PRESSURE: 117 MMHG | DIASTOLIC BLOOD PRESSURE: 75 MMHG

## 2023-06-13 DIAGNOSIS — R63.6 UNDERWEIGHT: ICD-10-CM

## 2023-06-13 DIAGNOSIS — F64.0 GENDER DYSPHORIA IN ADULT: Primary | ICD-10-CM

## 2023-06-13 PROCEDURE — 99213 OFFICE O/P EST LOW 20 MIN: CPT | Performed by: UROLOGY

## 2023-06-13 RX ORDER — PANCRELIPASE 24000; 76000; 120000 [USP'U]/1; [USP'U]/1; [USP'U]/1
CAPSULE, DELAYED RELEASE PELLETS ORAL
COMMUNITY
Start: 2023-06-02

## 2023-06-13 NOTE — PROGRESS NOTES
"SUBJECTIVE:  Jessica is a 33 year old transgender female here for a hair check consult in preparation for full depth vaginoplasty. She completed last laser hair removal session approximately 6 weeks ago. Her initial consult for vaginoplasty was done with Dr Tidwell before covid began. She has met with Dr Cutler previously, to discuss orchiectomy, but orchiectomy was ultimately done by Dr Santos. She is still seeking a full depth vaginoplasty but has some questions about how the procedure may vary from Dr Tidwell's methods.  She reports having a difficult time gaining and maintaining weight. She sometimes \"forgets to eat\" and has also been struggling with some GI issues. She had a recent colonoscopy which didn't find anything serious, but continues to have difficulty keeping or gaining weight. She has never seen a nutritionist, but is open to referral.     OBJECTIVE:  /75   Pulse 80   SpO2 96%  Reported 6'1\" and weight approximately 125lbs (BMI 16.5)  General: NAD  :    ASSESSMENT/PLAN:  Gender dysphoria  Patient needs additional hair removal prior to vaginoplasty.  Resources for electrolysis provided.  Patient to contact us after completing additional hair removal to schedule another hair check with Dr Cutler.    Low BMI: Discussion about healthy diet and nutrition to enable adequate healing after surgery. Encouraged patient to work with nutritionist to work on weight gain and nutrition. Emphasized no specific weight gain minimum exists, rather ensuring she is healthy enough and not malnourished going into surgery.    YO Gonzalez, CNP    Physician Attestation   I, Lucas Cutler MD, saw this patient and agree with the findings and plan of care as documented in the note.      Lucas Cutler MD  Reconstructive Urology    "

## 2023-06-13 NOTE — NURSING NOTE
Chief Complaint   Patient presents with     RECHECK     Hair removal check -- vaginoplasty       Vitals:    06/13/23 1448   BP: 117/75   Pulse: 80   SpO2: 96%       There is no height or weight on file to calculate BMI.          YANDEL MARK EMT

## 2023-06-13 NOTE — LETTER
"6/13/2023       RE: Jessica Yu  [address]  [place] [id number]     Dear Colleague,    Thank you for referring your patient, Jessica Yu, to the [facility] [place] [facility]. Please see a copy of my visit note below.    SUBJECTIVE:  Jessica is a 33 year old transgender female here for a hair check consult in preparation for full depth vaginoplasty. She completed last laser hair removal session approximately 6 weeks ago. Her initial consult for vaginoplasty was done with Dr Tidwell before covid began. She has met with Dr Cutler previously, to discuss orchiectomy, but orchiectomy was ultimately done by Dr Santos. She is still seeking a full depth vaginoplasty but has some questions about how the procedure may vary from Dr Tidwell's methods.  She reports having a difficult time gaining and maintaining weight. She sometimes \"forgets to eat\" and has also been struggling with some GI issues. She had a recent colonoscopy which didn't find anything serious, but continues to have difficulty keeping or gaining weight. She has never seen a nutritionist, but is open to referral.     OBJECTIVE:  /75   Pulse 80   SpO2 96%  Reported 6'1\" and weight approximately 125lbs (BMI 16.5)  General: NAD  :    ASSESSMENT/PLAN:  Gender dysphoria  Patient needs additional hair removal prior to vaginoplasty.  Resources for electrolysis provided.  Patient to contact us after completing additional hair removal to schedule another hair check with Dr Cutler.    Low BMI: Discussion about healthy diet and nutrition to enable adequate healing after surgery. Encouraged patient to work with nutritionist to work on weight gain and nutrition. Emphasized no specific weight gain minimum exists, rather ensuring she is healthy enough and not malnourished going into surgery.    YO Gonzalez, CNP    Physician Attestation   I, " Lucas Cutler MD, saw this patient and agree with the findings and plan of care as documented in the note.      Lucas Cutler MD  Reconstructive Urology

## 2023-07-28 DIAGNOSIS — F64.9 GENDER DYSPHORIA: ICD-10-CM

## 2023-07-29 RX ORDER — ESTRADIOL VALERATE 20 MG/ML
INJECTION INTRAMUSCULAR
Qty: 15 ML | Refills: 1 | Status: SHIPPED | OUTPATIENT
Start: 2023-07-29 | End: 2023-10-12

## 2023-10-04 DIAGNOSIS — F64.9 GENDER DYSPHORIA: ICD-10-CM

## 2023-10-04 RX ORDER — SYRINGE, DISPOSABLE, 1 ML
SYRINGE, EMPTY DISPOSABLE MISCELLANEOUS
Qty: 100 EACH | Refills: 4 | Status: SHIPPED | OUTPATIENT
Start: 2023-10-04 | End: 2023-10-12

## 2023-10-04 NOTE — TELEPHONE ENCOUNTER
"Request for medication refill:  B-D SYRINGE LUER-SHWETA 1 ML MISC   Providers if patient needs an appointment and you are willing to give a one month supply please refill for one month and  send a letter/MyChart using \".SMILLIMITEDREFILL\" .smillimited and route chart to \"P [facility]MI \" (Giving one month refill in non controlled medications is strongly recommended before denial)    If refill has been denied, meaning absolutely no refills without visit, please complete the smart phrase \".smirxrefuse\" and route it to the \"P SMI MED REFILLS\"  pool to inform the patient and the pharmacy.    Leda Montana MA     "

## 2023-10-12 ENCOUNTER — OFFICE VISIT (OUTPATIENT)
Dept: FAMILY MEDICINE | Facility: CLINIC | Age: 33
End: 2023-10-12
Payer: COMMERCIAL

## 2023-10-12 VITALS
BODY MASS INDEX: 15.83 KG/M2 | SYSTOLIC BLOOD PRESSURE: 118 MMHG | DIASTOLIC BLOOD PRESSURE: 72 MMHG | WEIGHT: 120 LBS | HEART RATE: 100 BPM | OXYGEN SATURATION: 99 %

## 2023-10-12 DIAGNOSIS — R68.2 DRY MOUTH: ICD-10-CM

## 2023-10-12 DIAGNOSIS — F64.9 GENDER DYSPHORIA: Primary | ICD-10-CM

## 2023-10-12 DIAGNOSIS — M79.641 PAIN OF RIGHT HAND: ICD-10-CM

## 2023-10-12 LAB
ALBUMIN SERPL BCG-MCNC: 4.2 G/DL (ref 3.5–5.2)
ALP SERPL-CCNC: 49 U/L (ref 35–129)
ALT SERPL W P-5'-P-CCNC: 14 U/L (ref 0–70)
ANION GAP SERPL CALCULATED.3IONS-SCNC: 9 MMOL/L (ref 7–15)
AST SERPL W P-5'-P-CCNC: 21 U/L (ref 0–45)
BILIRUB SERPL-MCNC: 0.3 MG/DL
BUN SERPL-MCNC: 11.9 MG/DL (ref 6–20)
CALCIUM SERPL-MCNC: 9.3 MG/DL (ref 8.6–10)
CHLORIDE SERPL-SCNC: 106 MMOL/L (ref 98–107)
CHOLEST SERPL-MCNC: 137 MG/DL
CREAT SERPL-MCNC: 0.63 MG/DL (ref 0.51–1.17)
DEPRECATED HCO3 PLAS-SCNC: 25 MMOL/L (ref 22–29)
EGFRCR SERPLBLD CKD-EPI 2021: >90 ML/MIN/1.73M2
ERYTHROCYTE [DISTWIDTH] IN BLOOD BY AUTOMATED COUNT: 11.6 % (ref 10–15)
ESTRADIOL SERPL-MCNC: 158 PG/ML
GLUCOSE SERPL-MCNC: 86 MG/DL (ref 70–99)
HCT VFR BLD AUTO: 39.1 % (ref 35–53)
HDLC SERPL-MCNC: 58 MG/DL
HGB BLD-MCNC: 13.8 G/DL (ref 11.7–17.7)
LDLC SERPL CALC-MCNC: 71 MG/DL
MCH RBC QN AUTO: 30.1 PG (ref 26.5–33)
MCHC RBC AUTO-ENTMCNC: 35.3 G/DL (ref 31.5–36.5)
MCV RBC AUTO: 85 FL (ref 78–100)
NONHDLC SERPL-MCNC: 79 MG/DL
PLATELET # BLD AUTO: 177 10E3/UL (ref 150–450)
POTASSIUM SERPL-SCNC: 3.9 MMOL/L (ref 3.4–5.3)
PROT SERPL-MCNC: 6.5 G/DL (ref 6.4–8.3)
RBC # BLD AUTO: 4.59 10E6/UL (ref 3.8–5.9)
SODIUM SERPL-SCNC: 140 MMOL/L (ref 135–145)
TRIGL SERPL-MCNC: 39 MG/DL
WBC # BLD AUTO: 4.5 10E3/UL (ref 4–11)

## 2023-10-12 PROCEDURE — 91320 SARSCV2 VAC 30MCG TRS-SUC IM: CPT | Performed by: FAMILY MEDICINE

## 2023-10-12 PROCEDURE — 86235 NUCLEAR ANTIGEN ANTIBODY: CPT | Performed by: FAMILY MEDICINE

## 2023-10-12 PROCEDURE — 80061 LIPID PANEL: CPT | Performed by: FAMILY MEDICINE

## 2023-10-12 PROCEDURE — 36415 COLL VENOUS BLD VENIPUNCTURE: CPT | Performed by: FAMILY MEDICINE

## 2023-10-12 PROCEDURE — 82670 ASSAY OF TOTAL ESTRADIOL: CPT | Mod: KX | Performed by: FAMILY MEDICINE

## 2023-10-12 PROCEDURE — 85027 COMPLETE CBC AUTOMATED: CPT | Performed by: FAMILY MEDICINE

## 2023-10-12 PROCEDURE — 99214 OFFICE O/P EST MOD 30 MIN: CPT | Mod: 25 | Performed by: FAMILY MEDICINE

## 2023-10-12 PROCEDURE — 90686 IIV4 VACC NO PRSV 0.5 ML IM: CPT | Performed by: FAMILY MEDICINE

## 2023-10-12 PROCEDURE — 90471 IMMUNIZATION ADMIN: CPT | Performed by: FAMILY MEDICINE

## 2023-10-12 PROCEDURE — 84403 ASSAY OF TOTAL TESTOSTERONE: CPT | Mod: KX | Performed by: FAMILY MEDICINE

## 2023-10-12 PROCEDURE — 80053 COMPREHEN METABOLIC PANEL: CPT | Performed by: FAMILY MEDICINE

## 2023-10-12 PROCEDURE — 90480 ADMN SARSCOV2 VAC 1/ONLY CMP: CPT | Performed by: FAMILY MEDICINE

## 2023-10-12 RX ORDER — NEEDLES, DISPOSABLE 25GX5/8"
NEEDLE, DISPOSABLE MISCELLANEOUS
Qty: 14 EACH | Refills: 3 | Status: SHIPPED | OUTPATIENT
Start: 2023-10-12

## 2023-10-12 RX ORDER — ESTRADIOL VALERATE 20 MG/ML
INJECTION INTRAMUSCULAR
Qty: 15 ML | Refills: 1 | Status: SHIPPED | OUTPATIENT
Start: 2023-10-12

## 2023-10-12 RX ORDER — SYRINGE, DISPOSABLE, 1 ML
SYRINGE, EMPTY DISPOSABLE MISCELLANEOUS
Qty: 100 EACH | Refills: 4 | Status: SHIPPED | OUTPATIENT
Start: 2023-10-12

## 2023-10-12 ASSESSMENT — ASTHMA QUESTIONNAIRES
ACT_TOTALSCORE: 25
QUESTION_3 LAST FOUR WEEKS HOW OFTEN DID YOUR ASTHMA SYMPTOMS (WHEEZING, COUGHING, SHORTNESS OF BREATH, CHEST TIGHTNESS OR PAIN) WAKE YOU UP AT NIGHT OR EARLIER THAN USUAL IN THE MORNING: NOT AT ALL
QUESTION_4 LAST FOUR WEEKS HOW OFTEN HAVE YOU USED YOUR RESCUE INHALER OR NEBULIZER MEDICATION (SUCH AS ALBUTEROL): NOT AT ALL
QUESTION_5 LAST FOUR WEEKS HOW WOULD YOU RATE YOUR ASTHMA CONTROL: COMPLETELY CONTROLLED
QUESTION_2 LAST FOUR WEEKS HOW OFTEN HAVE YOU HAD SHORTNESS OF BREATH: NOT AT ALL
QUESTION_1 LAST FOUR WEEKS HOW MUCH OF THE TIME DID YOUR ASTHMA KEEP YOU FROM GETTING AS MUCH DONE AT WORK, SCHOOL OR AT HOME: NONE OF THE TIME
ACT_TOTALSCORE: 25

## 2023-10-12 NOTE — PROGRESS NOTES
"  Assessment & Plan     Gender dysphoria  Repeat labs, refilled estradiol and supplies at current dose  - Estradiol; Future  - Testosterone total; Future  - Comprehensive metabolic panel; Future  - CBC with platelets; Future  - Lipid panel; Future  - Estradiol  - Testosterone total  - Comprehensive metabolic panel  - CBC with platelets  - Lipid panel  - estradiol valerate (DELESTROGEN) 20 MG/ML injection; ADMINISTER 0.3 ML IN THE MUSCLE 1 TIME EVERY OTHER WEEK  - BD HYPODERMIC NEEDLE 25G X 1-1/2\" MISC; Use 1 needle weekly for estrogen injections  - BD HYPODERMIC NEEDLE 18G X 1\" MISC; Use 1 weekly to draw up injectable medication  - B-D SYRINGE LUER-SHWETA 1 ML MISC; USE ONCE WEEKLY FOR ESTROGEN INJECTIONS    Dry mouth  - SSA Ro MYRON Antibody IgG; Future  - SSB La MYRON Antibody IgG; Future  - SSA Ro MYRON Antibody IgG  - SSB La MYRON Antibody IgG    Pain of right hand  - Occupational Therapy Referral; Future      No follow-ups on file.    Kriss Woodruff [facility] KETURAH Lopez is a 33 year old, presenting for the following health issues:  Consult (Gender care.)         No data to display                HPI     Digestive issues, ongoing workup which has not revealed specific cause. Had discussed continuing workup with [facility], including HIDA scan. Symptoms have been unchanged for some time. Taking Creon.    Dry mouth, worse for the last few weeks. No known change in diet or meds. Drinking a lot of water.    Hand pain, worse since started drawing last year. Pain in multiple joints without swelling or deformity.    HRT  0.3mL every other week - last inj ~10/1, going well since dose decrease      Review of Systems   Constitutional, HEENT, cardiovascular, pulmonary, gi and gu systems are negative, except as otherwise noted.      Objective    /72   Pulse 100   Wt 54.4 kg (120 lb)   SpO2 99%   BMI 15.83 kg/m    Body mass index is 15.83 kg/m .  Physical Exam   GENERAL: healthy, alert and " no distress  RESP: lungs clear to auscultation - no rales, rhonchi or wheezes  CV: regular rate and rhythm, normal S1 S2, no S3 or S4, no murmur, click or rub, no peripheral edema and peripheral pulses strong  ABDOMEN: soft, nontender, no hepatosplenomegaly, no masses and bowel sounds normal  MS: Bilateral hand strength and dexterity intact, no noticeable joint swelling/effusion, SILT

## 2023-10-13 LAB
ENA SS-A AB SER IA-ACNC: <0.5 U/ML
ENA SS-A AB SER IA-ACNC: NEGATIVE
ENA SS-B IGG SER IA-ACNC: <0.6 U/ML
ENA SS-B IGG SER IA-ACNC: NEGATIVE

## 2023-10-18 LAB — TESTOST SERPL-MCNC: 17 NG/DL (ref 8–950)

## 2023-11-06 DIAGNOSIS — F64.9 GENDER DYSPHORIA: ICD-10-CM

## 2023-11-06 RX ORDER — PROGESTERONE 200 MG/1
200 CAPSULE ORAL DAILY
Qty: 90 CAPSULE | Refills: 1 | Status: SHIPPED | OUTPATIENT
Start: 2023-11-06

## 2023-11-06 NOTE — TELEPHONE ENCOUNTER
"Request for medication refill: progesterone (PROMETRIUM) 200 MG capsule     Providers if patient needs an appointment and you are willing to give a one month supply please refill for one month and  send a letter/MyChart using \".SMILLIMITEDREFILL\" .smillimited and route chart to \"P [facility] \" (Giving one month refill in non controlled medications is strongly recommended before denial)    If refill has been denied, meaning absolutely no refills without visit, please complete the smart phrase \".smirxrefuse\" and route it to the \"P [facility] MED REFILLS\"  pool to inform the patient and the pharmacy.    CAROLE EM      "

## 2024-07-13 ENCOUNTER — HEALTH MAINTENANCE LETTER (OUTPATIENT)
Age: 34
End: 2024-07-13

## 2024-09-06 ENCOUNTER — PATIENT OUTREACH (OUTPATIENT)
Dept: FAMILY MEDICINE | Facility: CLINIC | Age: 34
End: 2024-09-06
Payer: COMMERCIAL

## 2024-09-06 NOTE — TELEPHONE ENCOUNTER
Patient Quality Outreach    Patient is due for the following:   Depression  -  Depression follow-up visit    Next Steps:   Schedule a office visit for depression screening    Type of outreach:    Sent [email or web address] message.      Questions for provider review:    None           Moraima Valentin CMA

## 2024-11-18 DIAGNOSIS — F64.9 GENDER DYSPHORIA: ICD-10-CM

## 2024-11-18 NOTE — TELEPHONE ENCOUNTER
"Request for medication refill:  progesterone (PROMETRIUM) 200 MG capsule     Providers if patient needs an appointment and you are willing to give a one month supply please refill for one month and  send a letter/MyChart using \".SMILLIMITEDREFILL\" .smillimited and route chart to \"P [facility] \" (Giving one month refill in non controlled medications is strongly recommended before denial)    If refill has been denied, meaning absolutely no refills without visit, please complete the smart phrase \".smirxrefuse\" and route it to the \"P SMI MED REFILLS\"  pool to inform the patient and the pharmacy.    Moraima Valentin, [facility]      "

## 2024-11-19 RX ORDER — PROGESTERONE 200 MG/1
200 CAPSULE ORAL DAILY
Qty: 90 CAPSULE | Refills: 1 | Status: SHIPPED | OUTPATIENT
Start: 2024-11-19

## 2025-04-21 NOTE — CONFIDENTIAL NOTE
Writer followed up on pt's online request to schedule a hair removal check return appt with Dr. Cutler. Pt is scheduled for 6/13/23.   
01-May-2025

## (undated) DEVICE — DRSG STERI STRIP 1/2X4" R1547

## (undated) DEVICE — DRAPE LAP TRANSVERSE 29421

## (undated) DEVICE — SU VICRYL 0 TIE 54" J608H

## (undated) DEVICE — GLOVE PROTEXIS W/NEU-THERA 8.0  2D73TE80

## (undated) DEVICE — LINEN TOWEL PACK X5 5464

## (undated) DEVICE — PACK MINOR CUSTOM ASC

## (undated) DEVICE — PREP CHLORAPREP 26ML TINTED ORANGE  260815

## (undated) DEVICE — SOL NACL 0.9% IRRIG 500ML BOTTLE 2F7123

## (undated) DEVICE — SUCTION MANIFOLD NEPTUNE 2 SYS 1 PORT 702-025-000

## (undated) DEVICE — SOL WATER IRRIG 500ML BOTTLE 2F7113

## (undated) DEVICE — BLADE CLIPPER SGL USE 9680

## (undated) DEVICE — ESU CORD BIPOLAR GREEN 10-4000

## (undated) DEVICE — ADH SKIN CLOSURE PREMIERPRO EXOFIN 1.0ML 3470

## (undated) DEVICE — ESU GROUND PAD ADULT W/CORD E7507

## (undated) DEVICE — SU MONOCRYL 4-0 PS-2 18" UND Y496G

## (undated) DEVICE — SU SILK 0 SH 30" K834H

## (undated) DEVICE — DRSG KERLIX FLUFFS X5

## (undated) DEVICE — SOL BENZOIN 0.5OZ

## (undated) DEVICE — DRSG PRIMAPORE 02X3" 7133

## (undated) DEVICE — SUPPORTER SCROTAL SUSPENSORY LG LATEX

## (undated) RX ORDER — CEFAZOLIN SODIUM 1 G/3ML
INJECTION, POWDER, FOR SOLUTION INTRAMUSCULAR; INTRAVENOUS
Status: DISPENSED
Start: 2019-09-17

## (undated) RX ORDER — PROPOFOL 10 MG/ML
INJECTION, EMULSION INTRAVENOUS
Status: DISPENSED
Start: 2019-09-17

## (undated) RX ORDER — LIDOCAINE HYDROCHLORIDE 20 MG/ML
INJECTION, SOLUTION EPIDURAL; INFILTRATION; INTRACAUDAL; PERINEURAL
Status: DISPENSED
Start: 2019-09-17

## (undated) RX ORDER — ACETAMINOPHEN 325 MG/1
TABLET ORAL
Status: DISPENSED
Start: 2019-09-17

## (undated) RX ORDER — DEXAMETHASONE SODIUM PHOSPHATE 4 MG/ML
INJECTION, SOLUTION INTRA-ARTICULAR; INTRALESIONAL; INTRAMUSCULAR; INTRAVENOUS; SOFT TISSUE
Status: DISPENSED
Start: 2019-09-17

## (undated) RX ORDER — GABAPENTIN 300 MG/1
CAPSULE ORAL
Status: DISPENSED
Start: 2019-09-17

## (undated) RX ORDER — ONDANSETRON 2 MG/ML
INJECTION INTRAMUSCULAR; INTRAVENOUS
Status: DISPENSED
Start: 2019-09-17

## (undated) RX ORDER — BUPIVACAINE HYDROCHLORIDE 5 MG/ML
INJECTION, SOLUTION EPIDURAL; INTRACAUDAL
Status: DISPENSED
Start: 2019-09-17

## (undated) RX ORDER — FENTANYL CITRATE 50 UG/ML
INJECTION, SOLUTION INTRAMUSCULAR; INTRAVENOUS
Status: DISPENSED
Start: 2019-09-17

## (undated) RX ORDER — KETOROLAC TROMETHAMINE 30 MG/ML
INJECTION, SOLUTION INTRAMUSCULAR; INTRAVENOUS
Status: DISPENSED
Start: 2019-09-17